# Patient Record
Sex: FEMALE | Race: WHITE | NOT HISPANIC OR LATINO | URBAN - METROPOLITAN AREA
[De-identification: names, ages, dates, MRNs, and addresses within clinical notes are randomized per-mention and may not be internally consistent; named-entity substitution may affect disease eponyms.]

---

## 2020-07-14 ENCOUNTER — TELEPHONE ENCOUNTER (OUTPATIENT)
Dept: URBAN - METROPOLITAN AREA CLINIC 92 | Facility: CLINIC | Age: 45
End: 2020-07-14

## 2020-07-14 NOTE — HPI-TODAY'S VISIT:
Spoke with pt and drinking and some social alcohol and she was having a bottle 750 ml q other night. She stopped that end of may. The labs June 18 up and says newest labs coming down to 106.  She says amlodipine was changed to a diuretic. Spironolactone. Meds: Toprol. Spironolactone. Dulera. Mirtazepine. Buspar, omeprazole.  Mirtazepine is more cholestatic.  Need to get a sooner appt and we need to see her sooner. She is getting therapy. Dr Issa

## 2020-07-17 ENCOUNTER — LAB OUTSIDE AN ENCOUNTER (OUTPATIENT)
Dept: URBAN - METROPOLITAN AREA TELEHEALTH 2 | Facility: TELEHEALTH | Age: 45
End: 2020-07-17

## 2020-07-17 ENCOUNTER — OFFICE VISIT (OUTPATIENT)
Dept: URBAN - METROPOLITAN AREA TELEHEALTH 2 | Facility: TELEHEALTH | Age: 45
End: 2020-07-17
Payer: COMMERCIAL

## 2020-07-17 DIAGNOSIS — F10.11 ALCOHOL USE DISORDER, MILD, IN EARLY REMISSION, ABUSE: ICD-10-CM

## 2020-07-17 DIAGNOSIS — K75.4 AUTOIMMUNE HEPATITIS: ICD-10-CM

## 2020-07-17 DIAGNOSIS — Z79.899 HIGH RISK MEDICATION USE: ICD-10-CM

## 2020-07-17 DIAGNOSIS — K71.9 DRUG-INDUCED LIVER DISEASE: ICD-10-CM

## 2020-07-17 PROCEDURE — G9908 NO PT TBCO CESS INTERV RNG: HCPCS

## 2020-07-17 PROCEDURE — G9906 PT RECV TBCO CESS INTERV: HCPCS

## 2020-07-17 PROCEDURE — G8427 DOCREV CUR MEDS BY ELIG CLIN: HCPCS

## 2020-07-17 PROCEDURE — 99203 OFFICE O/P NEW LOW 30 MIN: CPT

## 2020-07-17 PROCEDURE — 4004F PT TOBACCO SCREEN RCVD TLK: CPT

## 2020-07-17 PROCEDURE — G8417 CALC BMI ABV UP PARAM F/U: HCPCS

## 2020-07-17 PROCEDURE — G9902 PT SCRN TBCO AND ID AS USER: HCPCS

## 2020-07-17 RX ORDER — TIOTROPIUM BROMIDE 18 UG/1
CAPSULE ORAL; RESPIRATORY (INHALATION)
Qty: 0 | Refills: 0 | Status: DISCONTINUED | COMMUNITY
Start: 1900-01-01

## 2020-07-17 RX ORDER — SPIRONOLACTONE 25 MG/1
1 TABLET TABLET, FILM COATED ORAL
Status: ACTIVE | COMMUNITY

## 2020-07-17 RX ORDER — BUPROPION HYDROCHLORIDE 150 MG/1
1 TABLET IN THE MORNING TABLET, EXTENDED RELEASE ORAL ONCE A DAY
Status: ACTIVE | COMMUNITY

## 2020-07-17 RX ORDER — ALPRAZOLAM 0.5 MG
TAKE 1 TABLET BY ORAL ROUTE DAILY AS NEEDED TABLET ORAL 1
Qty: 0 | Refills: 0 | Status: DISCONTINUED | COMMUNITY
Start: 1900-01-01

## 2020-07-17 RX ORDER — ALBUTEROL SULFATE 90 UG/1
AEROSOL, METERED RESPIRATORY (INHALATION)
Qty: 0 | Refills: 0 | Status: DISCONTINUED | COMMUNITY
Start: 1900-01-01

## 2020-07-17 RX ORDER — METOPROLOL SUCCINATE 50 MG
1 TABLET TABLET, EXTENDED RELEASE 24 HR ORAL ONCE A DAY
Status: ACTIVE | COMMUNITY

## 2020-07-17 RX ORDER — MIRTAZAPINE 15 MG/1
1 TABLET AT BEDTIME TABLET, FILM COATED ORAL ONCE A DAY
Status: ACTIVE | COMMUNITY

## 2020-07-17 RX ORDER — MOMETASONE FUROATE AND FORMOTEROL FUMARATE DIHYDRATE 100; 5 UG/1; UG/1
2 PUFFS AEROSOL RESPIRATORY (INHALATION) TWICE A DAY
Status: ACTIVE | COMMUNITY

## 2020-07-17 RX ORDER — OMEPRAZOLE 40 MG/1
1 CAPSULE 30 MINUTES BEFORE MORNING MEAL CAPSULE, DELAYED RELEASE ORAL ONCE A DAY
Status: ACTIVE | COMMUNITY

## 2020-07-17 RX ORDER — OLANZAPINE 5 MG/1
TAKE 1 TABLET (5 MG) BY ORAL ROUTE ONCE DAILY TABLET ORAL 1
Qty: 0 | Refills: 0 | Status: DISCONTINUED | COMMUNITY
Start: 1900-01-01

## 2020-07-17 RX ORDER — TEMAZEPAM 30 MG/1
TAKE 1 CAPSULE (30 MG) BY ORAL ROUTE ONCE DAILY AT BEDTIME AS NEEDED CAPSULE ORAL 1
Qty: 0 | Refills: 0 | Status: DISCONTINUED | COMMUNITY
Start: 1900-01-01

## 2020-07-17 RX ORDER — VENLAFAXINE HCL 37.5 MG
TAKE 2 CAPSULES (75 MG) BY ORAL ROUTE ONCE DAILY CAPSULE, EXT RELEASE 24 HR ORAL 1
Qty: 0 | Refills: 0 | Status: DISCONTINUED | COMMUNITY
Start: 1900-01-01

## 2020-07-17 NOTE — HPI-TODAY'S VISIT:
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   The patient relates no significant family or personal history of liver disease.   She states no history of new medications or alcohol use. The patient reports a personal history of no other habits that could cause liver damage.   Interval testing:   primary 2020 glu 92 and bun 12 and cr 0.80,  na 139 and k 4.3 and cl 105 and co2 20 and ca 9.7 and tp 7.0 and alb 4.2 and tb 0.4 and alk 125 and ast 196 and alt 178. tsh 1.170.  2020 bun 15 and cr 0.75 and na 137 and k 4.9 and cl 103 and cpo2 22 and alb 4.2 and tb 0.2 and ast 28 and alt 106.  tb 0.2.  She stopped the alcohol and cocaine  or 2nd week and alchohol.  She had  for 5 yr s divorce ongoing.  In Oct 2019 and she started the cocaine [place] more regularly.  2015: wbc 8, hg 11.9, plat 444. glu 90, cr 0.66, na 137 k 4, ast 18 and alt 19, alk 91. \  May 2015: glu 130, cr 0.72, na 140, k 4.3, alb 4.0, tb 0.4, alk 79, ast 50 and alt 54. She had started a new medicine Invega and off that and on Xyprexa and still on effexor and back on the ursodiol and having cramps but not taking that with food so we will do so.  For her bipolar not on meds and psych told all current is depression and anxiety and for this mirtazepine 15mg po qd and wellbutrin 150mg am. Other anxiety med is buspar 5mg po bid.  Nov 10 2014 labs ast 15 and alt 16 alk 82, alb 3.8, bili 0.4.cr 0.83, na 138 and k 3.8, wbc 9.9, hg 13.1, plat 429 but then down to 364 on redo. Inr 0.99.   2014 Bx nonspecific hepatitis and subscapsular biopsy so hard to see fibrosis focal portal fibrosis. They suspected could be drug but not clearly.  Occ she has had alcohol issues and been off and she had a lot of issues.  Marijuana also being used and cautioned inc lfts in some and also suspected to cause fibrosis. In hcv pts who used it would be noted to have worse fibrosis progression melodie.   She says on toprol and  Spironolactone.  On some omeprazole and dulera.  Stressed to pt the need for social distancing and strict handwashing and wearing a mask and to follow any other new or added CDC recommendations as this is an evolving target.  Duration of visit was 32 minutes (portion on the video and portion by phone) with greater than 50% of time spent reviewing chart and events with the patient.   Attempts were made to use real-time two-way video technology for today's encounter. Due to a technological failure or access issues, telephone technology was used in lieu of an office visit due to the current COVID-19 pandemic crisis and need for social isolation.  Patient seen today via telehealth by agreement and consent of patient in light of current COVID-19 pandemic. I used video conferencing during the visit. The patient encounter is appropriate and reasonable under the circumstances given the patient's particular presentation at this time. The patient has been advised of the followin) the potential risks and limitations of this mode of treatment (including but not limited to the absence of in-person examination); 2) the right to refuse telehealth services at any point without affecting the right to future care; 3) the right to receive in-person services, included immediately after this consultation if an urgent need arises; 4) information, including identifiable images or information from this telehealth consult, will only be shared in accordance with HIPAA regulations. Any and all of the patient's and/or patient's family member's questions on this issue have been answered. The patient has verbally consented to be treated via telehealth services. The patient has also been advised to contact this office for worsening conditions or problems, and seek emergency medical treatment and/or call 911 if the patient deems either necessary.

## 2020-07-17 NOTE — EXAM-PHYSICAL EXAM
Telemed:  Gen: no distress. Eyes: no jaundice. Mouth: no thrush. CV: no chest pain. Resp: no wheezes. Abd: no pain but some bloating. Ext: no edema. Neuro: no weakness.

## 2020-08-03 ENCOUNTER — LAB OUTSIDE AN ENCOUNTER (OUTPATIENT)
Dept: URBAN - METROPOLITAN AREA TELEHEALTH 2 | Facility: TELEHEALTH | Age: 45
End: 2020-08-03

## 2020-08-07 ENCOUNTER — TELEPHONE ENCOUNTER (OUTPATIENT)
Dept: URBAN - METROPOLITAN AREA CLINIC 92 | Facility: CLINIC | Age: 45
End: 2020-08-07

## 2020-08-07 NOTE — HPI-TODAY'S VISIT:
Dear Chanda,  [facility].s [facility]: they see mild fatty infiltration of the liver. Prior gb surgery changes seen. Visualized pancreas portions normal. No liver masses. Common duct 4mm. Portal vein patent. Kidneys unremarkable. Spleen 7.8cm unremarkable.  Should get better as time passes from the issues we discussed.  Dr Issa

## 2020-08-10 ENCOUNTER — TELEPHONE ENCOUNTER (OUTPATIENT)
Dept: URBAN - METROPOLITAN AREA CLINIC 92 | Facility: CLINIC | Age: 45
End: 2020-08-10

## 2020-08-10 NOTE — HPI-OTHER HISTORIES
Dear Chanda,  Aug 4 2020 labs: glu 61 and little lowm bun 17 and cr 0.91 and na 135 and k 5.2 and cl 96 and co2 20 and ca 10.9 elevated and show local md. alb 4.7 and tb 0.6 and alk 104 and ast 24 and alt 28. ideal alt <25. so Labs much better.  You need to look at calcium issue with local md.  Dr Issa

## 2020-08-31 ENCOUNTER — LAB OUTSIDE AN ENCOUNTER (OUTPATIENT)
Dept: URBAN - METROPOLITAN AREA TELEHEALTH 2 | Facility: TELEHEALTH | Age: 45
End: 2020-08-31

## 2020-09-09 ENCOUNTER — OFFICE VISIT (OUTPATIENT)
Dept: URBAN - METROPOLITAN AREA TELEHEALTH 2 | Facility: TELEHEALTH | Age: 45
End: 2020-09-09
Payer: COMMERCIAL

## 2020-09-09 DIAGNOSIS — D80.2 IGA DEFICIENCY: ICD-10-CM

## 2020-09-09 DIAGNOSIS — Z71.89 VACCINE COUNSELING: ICD-10-CM

## 2020-09-09 DIAGNOSIS — Z98.890 HISTORY OF FUNDOPLICATION: ICD-10-CM

## 2020-09-09 DIAGNOSIS — G44.009: ICD-10-CM

## 2020-09-09 DIAGNOSIS — Z71.6 TOBACCO ABUSE COUNSELING: ICD-10-CM

## 2020-09-09 DIAGNOSIS — F31.30 BIPOLAR AFFECTIVE DISORDER, CURRENT EPISODE DEPRESSED, CURRENT EPISODE SEVERITY UNSPECIFIED: ICD-10-CM

## 2020-09-09 DIAGNOSIS — F10.11 ALCOHOL USE DISORDER, MILD, IN EARLY REMISSION, ABUSE: ICD-10-CM

## 2020-09-09 DIAGNOSIS — R74.8 ABNORMAL LIVER ENZYMES: ICD-10-CM

## 2020-09-09 DIAGNOSIS — K75.4 AUTOIMMUNE HEPATITIS: ICD-10-CM

## 2020-09-09 DIAGNOSIS — K71.9 DRUG-INDUCED LIVER DISEASE: ICD-10-CM

## 2020-09-09 DIAGNOSIS — Z79.899 HIGH RISK MEDICATION USE: ICD-10-CM

## 2020-09-09 DIAGNOSIS — E66.3 OVERWEIGHT: ICD-10-CM

## 2020-09-09 PROCEDURE — 1036F TOBACCO NON-USER: CPT

## 2020-09-09 PROCEDURE — G9903 PT SCRN TBCO ID AS NON USER: HCPCS

## 2020-09-09 PROCEDURE — G8417 CALC BMI ABV UP PARAM F/U: HCPCS

## 2020-09-09 PROCEDURE — G8427 DOCREV CUR MEDS BY ELIG CLIN: HCPCS

## 2020-09-09 PROCEDURE — 99214 OFFICE O/P EST MOD 30 MIN: CPT

## 2020-09-09 RX ORDER — OMEPRAZOLE 40 MG/1
1 CAPSULE 30 MINUTES BEFORE MORNING MEAL CAPSULE, DELAYED RELEASE ORAL ONCE A DAY
Status: ACTIVE | COMMUNITY

## 2020-09-09 RX ORDER — MIRTAZAPINE 15 MG/1
1 TABLET AT BEDTIME TABLET, FILM COATED ORAL ONCE A DAY
Status: ACTIVE | COMMUNITY

## 2020-09-09 RX ORDER — BUPROPION HYDROCHLORIDE 150 MG/1
1 TABLET IN THE MORNING TABLET, EXTENDED RELEASE ORAL ONCE A DAY
Status: DISCONTINUED | COMMUNITY

## 2020-09-09 RX ORDER — METOPROLOL SUCCINATE 50 MG
1 TABLET TABLET, EXTENDED RELEASE 24 HR ORAL ONCE A DAY
Status: ACTIVE | COMMUNITY

## 2020-09-09 RX ORDER — SPIRONOLACTONE 25 MG/1
1 TABLET TABLET, FILM COATED ORAL
Status: ACTIVE | COMMUNITY

## 2020-09-09 RX ORDER — BUSPIRONE HYDROCHLORIDE 10 MG/1
1 TABLET TABLET ORAL
Status: ACTIVE | COMMUNITY

## 2020-09-09 RX ORDER — MOMETASONE FUROATE AND FORMOTEROL FUMARATE DIHYDRATE 100; 5 UG/1; UG/1
2 PUFFS AEROSOL RESPIRATORY (INHALATION) TWICE A DAY
Status: ACTIVE | COMMUNITY

## 2020-09-09 RX ORDER — URSODIOL 300 MG/1
AS DIRECTED CAPSULE ORAL TWICE A DAY
Status: ACTIVE | COMMUNITY

## 2020-09-09 RX ORDER — FUROSEMIDE 40 MG/1
1 TABLET TABLET ORAL ONCE A DAY
Status: ACTIVE | COMMUNITY

## 2020-09-09 RX ORDER — LAMOTRIGINE 25 MG/1
3 TABLET ORAL ONCE A DAY
Status: ACTIVE | COMMUNITY

## 2020-09-09 NOTE — HPI-TODAY'S VISIT:
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   The patient relates no significant family or personal history of liver disease.   In the process of her divorce.  She has noted a lot of issues.  She states no history of new medications or alcohol use and she has stayed off that.  She says has some marijuana a few times a week and trying to cut back on cig.  Off the cocaine also.  She is working for possible 10-4 for [employer] for that.  The patient reports a personal history of no other habits that could cause liver damage.   Interval testing:   [facility].s [facility]: they see mild fatty infiltration of the liver. Prior gb surgery changes seen. Visualized pancreas portions normal. No liver masses. Common duct 4mm. Portal vein patent. Kidneys unremarkable. Spleen 7.8cm unremarkable.  Should get better as time passes from the issues we discussed.  Aug 4 2020 labs: glu 61 and little low bun 17 and cr 0.91 and na 135 and k 5.2 and cl 96 and co2 20 and ca 10.9 elevated and show local md. alb 4.7 and tb 0.6 and alk 104 and ast 24 and alt 28. ideal alt <25. so Labs much better.  her calcium issue noted and was up and should show to local  md.  Aug 28 2020 ca 9.7 and she says that just pre labs she was craving cereal and may have be fortified.  Rare advil or aleve.  2020 bun 15 and cr 0.75 and na 137 and k 4.9 and cl 103 and cpo2 22 and alb 4.2 and tb 0.2 and ast 28 and alt 106.  tb 0.2.   2020 glu 92 and bun 12 and cr 0.80,  na 139 and k 4.3 and cl 105 and co2 20 and ca 9.7 and tp 7.0 and alb 4.2 and tb 0.4 and alk 125 and ast 196 and alt 178. tsh 1.170.  She stopped the alcohol and cocaine  or 2nd week and alcohol.  She had  for 5 yr s divorce ongoing.  In Oct 2019 and she started the cocaine [place] more regularly.  2015: wbc 8, hg 11.9, plat 444. glu 90, cr 0.66, na 137 k 4, ast 18 and alt 19, alk 91.   May 2015: glu 130, cr 0.72, na 140, k 4.3, alb 4.0, tb 0.4, alk 79, ast 50 and alt 54. She had started a new medicine Invega and off that and on Xyprexa and still on effexor and back on the ursodiol and having cramps but not taking that with food so we will do so.  For her bipolar not on meds and psych told all current is depression and anxiety and for this mirtazepine 15mg po qd and wellbutrin 150mg am. Other anxiety med is buspar 5mg po bid.  Nov 10 2014 labs ast 15 and alt 16 alk 82, alb 3.8, bili 0.4.cr 0.83, na 138 and k 3.8, wbc 9.9, hg 13.1, plat 429 but then down to 364 on redo. Inr 0.99.    Bx nonspecific hepatitis and subscapsular biopsy so hard to see fibrosis focal portal fibrosis. They suspected could be drug but not clearly.  Occ she has had alcohol issues and been off and she had a lot of issues.  Marijuana also being used and cautioned inc lfts in some and also suspected to cause fibrosis. In hcv pts who used it would be noted to have worse fibrosis progression cure.  regarding med: on furosemide and still on toprol and Spironolactone. Also on some omeprazole and dulera.  Some pitting in calves and thighs.  Bp this weekend spiked up on . Weight up a few pounds.  Some tiny ankle swelling.  She says less issue.  Mother had mrsa pneumonia and in for a week and she was given some abx.  She does not feel well and eyes puffy.  Trouble sleeping and memory off.   Lamictal also given and she has been on this now for aug 17. Many of the symptoms could be from this also swathi memory issues.  On ursodiol for has been on it for her liver started  and she will stop it and see if labs stay well as having a lot of side effects. Never needed prior and labs so much better.  Stressed to pt the need for social distancing and strict handwashing and wearing a mask and to follow any other new or added CDC recommendations as this is an evolving target.  Duration of visit was 35 minutes via [employer] dev[employer] with greater than 50% of time spent reviewing chart and events with the patient.   More than half of the face-to-face time used for counseling and coordination of care. Patient seen today via telehealth by agreement and consent of patient in light of current COVID-19 pandemic. I used video conferencing during the visit. The patient encounter is appropriate and reasonable under the circumstances given the patient's particular presentation at this time. The patient has been advised of the followin) the potential risks and limitations of this mode of treatment (including but not limited to the absence of in-person examination); 2) the right to refuse telehealth services at any point without affecting the right to future care; 3) the right to receive in-person services, included immediately after this consultation if an urgent need arises; 4) information, including identifiable images or information from this telehealth consult, will only be shared in accordance with HIPAA regulations. Any and all of the patient's and/or patient's family member's questions on this issue have been answered. The patient has verbally consented to be treated via telehealth services. The patient has also been advised to contact this office for worsening conditions or problems, and seek emergency medical treatment and/or call 911 if the patient deems either necessary.

## 2020-09-09 NOTE — EXAM-PHYSICAL EXAM
Telemed:  Gen: no distress but  with new meds and bloating and concentration and some leg swelling. Eyes: no jaundice. Mouth: no thrush. CV: no chest pain. Resp: no wheezes. Abd: no pain but some bloating. Ext: no edema. Neuro: no weakness.

## 2020-09-13 ENCOUNTER — TELEPHONE ENCOUNTER (OUTPATIENT)
Dept: URBAN - METROPOLITAN AREA CLINIC 92 | Facility: CLINIC | Age: 45
End: 2020-09-13

## 2020-09-13 NOTE — HPI-TODAY'S VISIT:
Chanda  Saw some local labs sent in: not sure date: bun 17 and cr 0.92 and na 138 and k 4.1 and cl 101 and co2 25 and ca 9.7 and tb 0.5 and alk 88 and ast 16 and alt 20. vit d low 16.6.   Dr Issa

## 2020-09-16 ENCOUNTER — LAB OUTSIDE AN ENCOUNTER (OUTPATIENT)
Dept: URBAN - METROPOLITAN AREA TELEHEALTH 2 | Facility: TELEHEALTH | Age: 45
End: 2020-09-16

## 2020-09-28 ENCOUNTER — LAB OUTSIDE AN ENCOUNTER (OUTPATIENT)
Dept: URBAN - METROPOLITAN AREA TELEHEALTH 2 | Facility: TELEHEALTH | Age: 45
End: 2020-09-28

## 2020-09-30 ENCOUNTER — LAB OUTSIDE AN ENCOUNTER (OUTPATIENT)
Dept: URBAN - METROPOLITAN AREA TELEHEALTH 2 | Facility: TELEHEALTH | Age: 45
End: 2020-09-30

## 2020-10-08 ENCOUNTER — TELEPHONE ENCOUNTER (OUTPATIENT)
Dept: URBAN - METROPOLITAN AREA CLINIC 92 | Facility: CLINIC | Age: 45
End: 2020-10-08

## 2020-10-08 NOTE — HPI-OTHER HISTORIES
Dear Chanda, Oct 6: wbc 11.2 little up, hg 14.4 and hct 42.2, plat 141. Mcv 90, glu 103 little up and maybe not fasting.  Bun 20 and cr 0.98 and na 135 and k 4.5, cl 99 and co2 22. Alb 4.2 and tb 0.5 and alk 111 and ast 16 and alt 25. Ideal alt <25.  Prior aug ast 24 and alt 28 so better now and alk 104. So labs better.   Dr Issa

## 2020-10-14 ENCOUNTER — LAB OUTSIDE AN ENCOUNTER (OUTPATIENT)
Dept: URBAN - METROPOLITAN AREA TELEHEALTH 2 | Facility: TELEHEALTH | Age: 45
End: 2020-10-14

## 2020-10-21 ENCOUNTER — TELEPHONE ENCOUNTER (OUTPATIENT)
Dept: URBAN - METROPOLITAN AREA CLINIC 92 | Facility: CLINIC | Age: 45
End: 2020-10-21

## 2020-10-21 NOTE — HPI-OTHER HISTORIES
Dear Chanda, Oct 19 labs spiked again: alk 198 and ast 128 and alt 248. Tb 0.5. na 136 and k k 4.5. bun 15 and cr 0.94 and glu 112. Oct 6 ast 16 and alt 25 and alk 111.  Could not reach you tonight: will ask Melanie to call you in am.  Dr Issa

## 2020-10-22 ENCOUNTER — OFFICE VISIT (OUTPATIENT)
Dept: URBAN - METROPOLITAN AREA TELEHEALTH 2 | Facility: TELEHEALTH | Age: 45
End: 2020-10-22
Payer: COMMERCIAL

## 2020-10-22 DIAGNOSIS — K75.4 AUTOIMMUNE HEPATITIS: ICD-10-CM

## 2020-10-22 DIAGNOSIS — K71.8 TOXIC LIVER DISEASE WITH OTHER DISORDERS OF LIVER: ICD-10-CM

## 2020-10-22 DIAGNOSIS — D80.2 IGA DEFICIENCY: ICD-10-CM

## 2020-10-22 DIAGNOSIS — R74.8 ABNORMAL LIVER ENZYMES: ICD-10-CM

## 2020-10-22 PROCEDURE — G8483 FLU IMM NO ADMIN DOC REA: HCPCS

## 2020-10-22 PROCEDURE — G9902 PT SCRN TBCO AND ID AS USER: HCPCS

## 2020-10-22 PROCEDURE — G8427 DOCREV CUR MEDS BY ELIG CLIN: HCPCS

## 2020-10-22 PROCEDURE — G9906 PT RECV TBCO CESS INTERV: HCPCS

## 2020-10-22 PROCEDURE — G8417 CALC BMI ABV UP PARAM F/U: HCPCS

## 2020-10-22 PROCEDURE — 99214 OFFICE O/P EST MOD 30 MIN: CPT

## 2020-10-22 RX ORDER — BUSPIRONE HYDROCHLORIDE 5 MG/1
1 TABLET TABLET ORAL
Status: ACTIVE | COMMUNITY

## 2020-10-22 RX ORDER — GABAPENTIN 100 MG/1
1 CAPSULE AND 3 PM CAPSULE ORAL
Status: ACTIVE | COMMUNITY

## 2020-10-22 RX ORDER — METOPROLOL SUCCINATE 50 MG
1 TABLET TABLET, EXTENDED RELEASE 24 HR ORAL ONCE A DAY
Status: ACTIVE | COMMUNITY

## 2020-10-22 RX ORDER — SPIRONOLACTONE 25 MG/1
1 TABLET TABLET, FILM COATED ORAL
Status: ACTIVE | COMMUNITY

## 2020-10-22 RX ORDER — MIRTAZAPINE 7.5 MG/1
0.5 TABLET AT BEDTIME TABLET, FILM COATED ORAL ONCE A DAY
Status: ACTIVE | COMMUNITY

## 2020-10-22 RX ORDER — LAMOTRIGINE 25 MG/1
2 IN AM AND 1 IN NOON TABLET ORAL
Status: ACTIVE | COMMUNITY

## 2020-10-22 RX ORDER — FUROSEMIDE 40 MG/1
1 TABLET TABLET ORAL ONCE A DAY
Status: ACTIVE | COMMUNITY

## 2020-10-22 RX ORDER — OMEPRAZOLE 40 MG/1
1 CAPSULE 30 MINUTES BEFORE MORNING MEAL CAPSULE, DELAYED RELEASE ORAL ONCE A DAY
Status: ACTIVE | COMMUNITY

## 2020-10-22 RX ORDER — MOMETASONE FUROATE AND FORMOTEROL FUMARATE DIHYDRATE 100; 5 UG/1; UG/1
2 PUFFS AEROSOL RESPIRATORY (INHALATION) TWICE A DAY
Status: ACTIVE | COMMUNITY

## 2020-10-22 RX ORDER — URSODIOL 300 MG/1
AS DIRECTED CAPSULE ORAL TWICE A DAY
Status: DISCONTINUED | COMMUNITY

## 2020-10-22 NOTE — EXAM-PHYSICAL EXAM
Telemed:  Gen: no distress but  with new meds and bloating issues. Eyes: no jaundice. Mouth: no thrush. CV: no chest pain. Resp: no wheezes. Abd: no pain but some bloating. Ext: no edema. Neuro: no weakness.

## 2020-10-26 ENCOUNTER — OFFICE VISIT (OUTPATIENT)
Dept: URBAN - METROPOLITAN AREA TELEHEALTH 2 | Facility: TELEHEALTH | Age: 45
End: 2020-10-26
Payer: COMMERCIAL

## 2020-10-26 ENCOUNTER — LAB OUTSIDE AN ENCOUNTER (OUTPATIENT)
Dept: URBAN - METROPOLITAN AREA TELEHEALTH 2 | Facility: TELEHEALTH | Age: 45
End: 2020-10-26

## 2020-10-26 DIAGNOSIS — R11.2 NAUSEA AND VOMITING: ICD-10-CM

## 2020-10-26 DIAGNOSIS — K75.4 AUTOIMMUNE HEPATITIS: ICD-10-CM

## 2020-10-26 DIAGNOSIS — R13.10 DYSPHAGIA: ICD-10-CM

## 2020-10-26 DIAGNOSIS — R19.4 CHANGE IN BOWEL HABITS: ICD-10-CM

## 2020-10-26 PROBLEM — 266433003 GASTROESOPHAGEAL REFLUX DISEASE WITH ESOPHAGITIS: Status: ACTIVE | Noted: 2020-10-26

## 2020-10-26 PROCEDURE — 4004F PT TOBACCO SCREEN RCVD TLK: CPT | Performed by: INTERNAL MEDICINE

## 2020-10-26 PROCEDURE — G9902 PT SCRN TBCO AND ID AS USER: HCPCS | Performed by: INTERNAL MEDICINE

## 2020-10-26 PROCEDURE — G8417 CALC BMI ABV UP PARAM F/U: HCPCS | Performed by: INTERNAL MEDICINE

## 2020-10-26 PROCEDURE — G9906 PT RECV TBCO CESS INTERV: HCPCS | Performed by: INTERNAL MEDICINE

## 2020-10-26 PROCEDURE — G8482 FLU IMMUNIZE ORDER/ADMIN: HCPCS | Performed by: INTERNAL MEDICINE

## 2020-10-26 PROCEDURE — G8427 DOCREV CUR MEDS BY ELIG CLIN: HCPCS | Performed by: INTERNAL MEDICINE

## 2020-10-26 PROCEDURE — 99215 OFFICE O/P EST HI 40 MIN: CPT | Performed by: INTERNAL MEDICINE

## 2020-10-26 RX ORDER — SPIRONOLACTONE 25 MG/1
1 TABLET TABLET, FILM COATED ORAL
Status: ACTIVE | COMMUNITY

## 2020-10-26 RX ORDER — GABAPENTIN 100 MG/1
1 CAPSULE AND 3 PM CAPSULE ORAL
Status: ACTIVE | COMMUNITY

## 2020-10-26 RX ORDER — METOPROLOL SUCCINATE 50 MG
1 TABLET TABLET, EXTENDED RELEASE 24 HR ORAL ONCE A DAY
Status: ACTIVE | COMMUNITY

## 2020-10-26 RX ORDER — BUSPIRONE HYDROCHLORIDE 5 MG/1
1 TABLET TABLET ORAL
Status: ACTIVE | COMMUNITY

## 2020-10-26 RX ORDER — LAMOTRIGINE 25 MG/1
2 IN AM AND 1 IN NOON TABLET ORAL
Status: ACTIVE | COMMUNITY

## 2020-10-26 RX ORDER — FUROSEMIDE 40 MG/1
1 TABLET TABLET ORAL ONCE A DAY
Status: ACTIVE | COMMUNITY

## 2020-10-26 RX ORDER — OMEPRAZOLE 40 MG/1
1 CAPSULE 30 MINUTES BEFORE MORNING MEAL CAPSULE, DELAYED RELEASE ORAL ONCE A DAY
Status: ACTIVE | COMMUNITY

## 2020-10-26 RX ORDER — MOMETASONE FUROATE AND FORMOTEROL FUMARATE DIHYDRATE 100; 5 UG/1; UG/1
2 PUFFS AEROSOL RESPIRATORY (INHALATION) TWICE A DAY
Status: ACTIVE | COMMUNITY

## 2020-10-26 RX ORDER — MIRTAZAPINE 7.5 MG/1
0.5 TABLET AT BEDTIME TABLET, FILM COATED ORAL ONCE A DAY
Status: ACTIVE | COMMUNITY

## 2020-10-26 NOTE — HPI-TODAY'S VISIT:
- 46 yo  female who I am seeing today in a Telehealth video visit for follow-up - Has a h/o functional GI disorders, including IBS and gastroparesis, for which she was previously following with Dr. Susy Manrique, whom she last saw in 10/2014 - In 11/2014 she had hiatal hernia repair, fundoplication, and pyloroplasty performed by Dr. Yvon Dela at [facility] (she appears as Chanda Sarmiento in the [facility] records).  She did well after that surgery.  However, today the patient complains of worsening GI symptoms for the past 3 months.  These include frequent nausea, vomiting, dysphagia, abdominal bloating, and early satiety.  Gets intermittent abdominal cramping.  Denies h/o diabetes.  Denies weight loss; states instead that she has been gaining weight. - Has been taking omeprazole 40 mg daily for the past 3 months, with only partial relief of symptoms - She also notes a change in bowel habits: diarrhea for the past 2 months, whereas previously she was constipated.  She is currently having 2-4 stools per day, ranging between loose and soft.  States she completed a course of antibiotics 2 weeks ago by her PCP.  She forgot what her PCP was treating. - Has autoimmune hepatitis for which she sees Dr. Jonas Issa; she last saw him 4 days ago and I have reviewed his office note.  Dr. Issa ordered bloodwork which is still pending.

## 2020-10-29 ENCOUNTER — TELEPHONE ENCOUNTER (OUTPATIENT)
Dept: URBAN - METROPOLITAN AREA CLINIC 92 | Facility: CLINIC | Age: 45
End: 2020-10-29

## 2020-10-29 LAB
ALBUMIN: 4.3
ALKALINE PHOSPHATASE: 210
ALT (SGPT): 222
AST (SGOT): 121
BILIRUBIN, DIRECT: 0.19
BILIRUBIN, TOTAL: 0.5
PROTEIN, TOTAL: 7.4

## 2020-10-29 NOTE — HPI-OTHER HISTORIES
Dear Chanda Segura The results of your recent tests are explained below: oct 26 labs tb 0.5 and alk 210 and ast 121 and alt 222. alk 248 and so now 210 so that is better, and ast 128 and that is now 121 and alt was 248 and now 222 so better and we need to follow.  Would redo in 1 week or so as still up but can slow once turns more. Left vmail.

## 2020-11-02 ENCOUNTER — LAB OUTSIDE AN ENCOUNTER (OUTPATIENT)
Dept: URBAN - METROPOLITAN AREA TELEHEALTH 2 | Facility: TELEHEALTH | Age: 45
End: 2020-11-02

## 2020-11-03 ENCOUNTER — TELEPHONE ENCOUNTER (OUTPATIENT)
Dept: URBAN - METROPOLITAN AREA CLINIC 92 | Facility: CLINIC | Age: 45
End: 2020-11-03

## 2020-11-03 LAB
ALBUMIN: 4.7
ALKALINE PHOSPHATASE: 213
ALT (SGPT): 118
AST (SGOT): 50
BILIRUBIN, DIRECT: 0.15
BILIRUBIN, TOTAL: 0.4
PROTEIN, TOTAL: 7.9

## 2020-11-03 NOTE — HPI-OTHER HISTORIES
Dear Chanda Segura The results of your recent tests are explained below: 11-2: ast down to 50 and alt 118 and prior 121 and alt 222 . alk 213 still up from 210 and tb down to 0.4 from 0.5.  Called pt first came off lamictal 4-5 days ago. Prior the green tea use also. That may have revved up her system and still off the ursodiol. Saw primary md and do next week and  we can look at that again and in 2 weeks.

## 2020-11-04 LAB
GASTROINTESTINAL PATHOGEN: (no result)
PANCREATICELASTASE ELISA, STOOL: (no result)

## 2020-11-13 ENCOUNTER — TELEPHONE ENCOUNTER (OUTPATIENT)
Dept: URBAN - METROPOLITAN AREA CLINIC 23 | Facility: CLINIC | Age: 45
End: 2020-11-13

## 2020-11-13 RX ORDER — OMEPRAZOLE 40 MG/1
1 CAPSULE 30 MINUTES BEFORE MORNING MEAL CAPSULE, DELAYED RELEASE ORAL ONCE A DAY
Status: ACTIVE | COMMUNITY

## 2020-11-13 RX ORDER — FUROSEMIDE 40 MG/1
1 TABLET TABLET ORAL ONCE A DAY
Status: ACTIVE | COMMUNITY

## 2020-11-13 RX ORDER — SPIRONOLACTONE 25 MG/1
1 TABLET TABLET, FILM COATED ORAL
Status: ACTIVE | COMMUNITY

## 2020-11-13 RX ORDER — GABAPENTIN 100 MG/1
1 CAPSULE AND 3 PM CAPSULE ORAL
Status: ACTIVE | COMMUNITY

## 2020-11-13 RX ORDER — BUSPIRONE HYDROCHLORIDE 5 MG/1
1 TABLET TABLET ORAL
Status: ACTIVE | COMMUNITY

## 2020-11-13 RX ORDER — MIRTAZAPINE 7.5 MG/1
0.5 TABLET AT BEDTIME TABLET, FILM COATED ORAL ONCE A DAY
Status: ACTIVE | COMMUNITY

## 2020-11-13 RX ORDER — METOPROLOL SUCCINATE 50 MG
1 TABLET TABLET, EXTENDED RELEASE 24 HR ORAL ONCE A DAY
Status: ACTIVE | COMMUNITY

## 2020-11-13 RX ORDER — LAMOTRIGINE 25 MG/1
2 IN AM AND 1 IN NOON TABLET ORAL
Status: ACTIVE | COMMUNITY

## 2020-11-13 RX ORDER — POLYETHYLENE GLYOCOL 3350, SODIUM CHLORIDE, SODIUM BICARBONATE AND POTASSIUM CHLORIDE 420; 11.2; 5.72; 1.48 G/4L; G/4L; G/4L; G/4L
TAKE AS DIRECTED IN SPLIT DOSE FASHION.  MUST COMPLETE BOWEL PREP AT LEAST 4 HOURS PRIOR TO YOUR SCHEDULED COLONOSCOPY POWDER, FOR SOLUTION NASOGASTRIC; ORAL ONCE
Qty: 1 | Refills: 0 | OUTPATIENT
Start: 2020-11-16 | End: 2020-11-17

## 2020-11-13 RX ORDER — MOMETASONE FUROATE AND FORMOTEROL FUMARATE DIHYDRATE 100; 5 UG/1; UG/1
2 PUFFS AEROSOL RESPIRATORY (INHALATION) TWICE A DAY
Status: ACTIVE | COMMUNITY

## 2020-11-16 ENCOUNTER — TELEPHONE ENCOUNTER (OUTPATIENT)
Dept: URBAN - METROPOLITAN AREA CLINIC 23 | Facility: CLINIC | Age: 45
End: 2020-11-16

## 2020-11-17 ENCOUNTER — WEB ENCOUNTER (OUTPATIENT)
Dept: URBAN - METROPOLITAN AREA CLINIC 23 | Facility: CLINIC | Age: 45
End: 2020-11-17

## 2020-11-17 ENCOUNTER — TELEPHONE ENCOUNTER (OUTPATIENT)
Dept: URBAN - METROPOLITAN AREA CLINIC 23 | Facility: CLINIC | Age: 45
End: 2020-11-17

## 2020-11-17 ENCOUNTER — TELEPHONE ENCOUNTER (OUTPATIENT)
Dept: URBAN - METROPOLITAN AREA CLINIC 92 | Facility: CLINIC | Age: 45
End: 2020-11-17

## 2020-11-17 NOTE — HPI-OTHER HISTORIES
Sandipcornelius,  Nov 9 labs:  wbc 13. 4hg 14 and plat 453 elevated slightly (150-450 normal). na 135 k 4.6 and cl 97 and co2 26 and calcium elevated 11.0. Show to local md: why is calcium up in you?alb 4.5 and tv 0.6 and alk 165 elevated and ast 22 and alt 36 so back down. chol 336 elevated and trg 307 and ldl 219. Lipids off and not sure if due to recent liver flare or other and may want to redo that as known if liver flared that this can be off. Is this usual for you to have numbers like that ?vit d low 28.8. a1c 5.9%.  Nov 3: ast 50 and alt 121 (both down then from 121 and 222). So this is now off green tea and the lamictal also off prior. Suspect gree tea was immune stimulant revving up your immune system.) I would redo lfts in in 2 more weeks and suspect should be better and then can slow down. Caution with use of immune stimulants like green tea or tumeric or elderberry or zinc as that can and will rev up the lfts in pts with immune issues like we have always suspected you to have.  Dr Issa

## 2020-11-19 ENCOUNTER — TELEPHONE ENCOUNTER (OUTPATIENT)
Dept: URBAN - METROPOLITAN AREA CLINIC 23 | Facility: CLINIC | Age: 45
End: 2020-11-19

## 2020-11-23 ENCOUNTER — LAB OUTSIDE AN ENCOUNTER (OUTPATIENT)
Dept: URBAN - METROPOLITAN AREA TELEHEALTH 2 | Facility: TELEHEALTH | Age: 45
End: 2020-11-23

## 2020-11-26 ENCOUNTER — TELEPHONE ENCOUNTER (OUTPATIENT)
Dept: URBAN - METROPOLITAN AREA CLINIC 92 | Facility: CLINIC | Age: 45
End: 2020-11-26

## 2020-11-26 LAB
A/G RATIO: 1.3
ALBUMIN: 4.7
ALKALINE PHOSPHATASE: 130
ALT (SGPT): 18
AST (SGOT): 15
BASO (ABSOLUTE): 0.1
BASOS: 1
BILIRUBIN, TOTAL: 0.4
BUN/CREATININE RATIO: 13
BUN: 14
CALCIUM: 10
CARBON DIOXIDE, TOTAL: 24
CHLORIDE: 99
CREATININE: 1.08
EGFR IF AFRICN AM: 71
EGFR IF NONAFRICN AM: 62
EOS (ABSOLUTE): 0.3
EOS: 3
GLOBULIN, TOTAL: 3.5
GLUCOSE: 144
HEMATOCRIT: 41.7
HEMATOLOGY COMMENTS:: (no result)
HEMOGLOBIN: 14.1
IMMATURE CELLS: (no result)
IMMATURE GRANS (ABS): 0
IMMATURE GRANULOCYTES: 0
LYMPHS (ABSOLUTE): 2.4
LYMPHS: 21
MCH: 30.1
MCHC: 33.8
MCV: 89
MONOCYTES(ABSOLUTE): 0.7
MONOCYTES: 7
NEUTROPHILS (ABSOLUTE): 7.7
NEUTROPHILS: 68
NRBC: (no result)
PLATELETS: 482
POTASSIUM: 4.3
PROTEIN, TOTAL: 8.2
RBC: 4.68
RDW: 12.9
SODIUM: 139
WBC: 11.3

## 2020-11-26 NOTE — HPI-TODAY'S VISIT:
Dear Chanda Segura The results of your recent tests are explained below: nov 25 wbc 11.3 hg 14.1 plat 482 little up and follow. mcv 89. na 139 and k 4.3 and cl 99 and co2 24 and bun 14 and cr 1.08 and glu 144 elevated. ast 50 and alt 118 and alk 213 prior and so much better.  May need to see someone re why platelets staying up: may be due to that reaction or other med reaction?

## 2020-12-07 ENCOUNTER — OFFICE VISIT (OUTPATIENT)
Dept: URBAN - METROPOLITAN AREA TELEHEALTH 2 | Facility: TELEHEALTH | Age: 45
End: 2020-12-07
Payer: COMMERCIAL

## 2020-12-07 ENCOUNTER — OFFICE VISIT (OUTPATIENT)
Dept: URBAN - METROPOLITAN AREA LAB 3 | Facility: LAB | Age: 45
End: 2020-12-07
Payer: COMMERCIAL

## 2020-12-07 DIAGNOSIS — R74.8 ABNORMAL LIVER ENZYMES: ICD-10-CM

## 2020-12-07 DIAGNOSIS — R19.7 ACUTE DIARRHEA: ICD-10-CM

## 2020-12-07 DIAGNOSIS — K62.1 DYSPLASTIC POLYP OF RECTUM: ICD-10-CM

## 2020-12-07 DIAGNOSIS — K21.00 ALKALINE REFLUX ESOPHAGITIS: ICD-10-CM

## 2020-12-07 DIAGNOSIS — K29.60 ADENOPAPILLOMATOSIS GASTRICA: ICD-10-CM

## 2020-12-07 DIAGNOSIS — K63.5 BENIGN COLON POLYP: ICD-10-CM

## 2020-12-07 DIAGNOSIS — K75.4 AUTOIMMUNE HEPATITIS: ICD-10-CM

## 2020-12-07 DIAGNOSIS — R13.19 CERVICAL DYSPHAGIA: ICD-10-CM

## 2020-12-07 DIAGNOSIS — K71.9 DRUG-INDUCED LIVER DISEASE: ICD-10-CM

## 2020-12-07 DIAGNOSIS — Z79.899 HIGH RISK MEDICATION USE: ICD-10-CM

## 2020-12-07 PROCEDURE — G8483 FLU IMM NO ADMIN DOC REA: HCPCS

## 2020-12-07 PROCEDURE — G8417 CALC BMI ABV UP PARAM F/U: HCPCS

## 2020-12-07 PROCEDURE — 45380 COLONOSCOPY AND BIOPSY: CPT | Performed by: INTERNAL MEDICINE

## 2020-12-07 PROCEDURE — 43248 EGD GUIDE WIRE INSERTION: CPT | Performed by: INTERNAL MEDICINE

## 2020-12-07 PROCEDURE — G9902 PT SCRN TBCO AND ID AS USER: HCPCS

## 2020-12-07 PROCEDURE — 4004F PT TOBACCO SCREEN RCVD TLK: CPT

## 2020-12-07 PROCEDURE — G9937 DIG OR SURV COLSCO: HCPCS | Performed by: INTERNAL MEDICINE

## 2020-12-07 PROCEDURE — G8427 DOCREV CUR MEDS BY ELIG CLIN: HCPCS

## 2020-12-07 PROCEDURE — 43239 EGD BIOPSY SINGLE/MULTIPLE: CPT | Performed by: INTERNAL MEDICINE

## 2020-12-07 PROCEDURE — 99214 OFFICE O/P EST MOD 30 MIN: CPT

## 2020-12-07 PROCEDURE — 45385 COLONOSCOPY W/LESION REMOVAL: CPT | Performed by: INTERNAL MEDICINE

## 2020-12-07 PROCEDURE — G9906 PT RECV TBCO CESS INTERV: HCPCS

## 2020-12-07 RX ORDER — MIRTAZAPINE 7.5 MG/1
0.5 TABLET AT BEDTIME TABLET, FILM COATED ORAL ONCE A DAY
Status: ACTIVE | COMMUNITY

## 2020-12-07 RX ORDER — OMEPRAZOLE 40 MG/1
1 CAPSULE 30 MINUTES BEFORE MORNING MEAL CAPSULE, DELAYED RELEASE ORAL ONCE A DAY
Status: ACTIVE | COMMUNITY

## 2020-12-07 RX ORDER — SPIRONOLACTONE 25 MG/1
1 TABLET TABLET, FILM COATED ORAL
Status: ACTIVE | COMMUNITY

## 2020-12-07 RX ORDER — METOPROLOL SUCCINATE 50 MG
1 TABLET TABLET, EXTENDED RELEASE 24 HR ORAL ONCE A DAY
Status: ACTIVE | COMMUNITY

## 2020-12-07 RX ORDER — MOMETASONE FUROATE AND FORMOTEROL FUMARATE DIHYDRATE 100; 5 UG/1; UG/1
2 PUFFS AEROSOL RESPIRATORY (INHALATION) TWICE A DAY
Status: ACTIVE | COMMUNITY

## 2020-12-07 RX ORDER — LAMOTRIGINE 25 MG/1
2 IN AM AND 1 IN NOON TABLET ORAL
Status: ACTIVE | COMMUNITY

## 2020-12-07 RX ORDER — BUSPIRONE HYDROCHLORIDE 5 MG/1
1 TABLET TABLET ORAL
Status: ACTIVE | COMMUNITY

## 2020-12-07 RX ORDER — ATORVASTATIN CALCIUM 20 MG/1
1 TABLET TABLET, FILM COATED ORAL ONCE A DAY
Status: ACTIVE | COMMUNITY

## 2020-12-07 RX ORDER — MIRTAZAPINE 7.5 MG/1
0.5 TABLET AT BEDTIME TABLET, FILM COATED ORAL ONCE A DAY
Status: DISCONTINUED | COMMUNITY

## 2020-12-07 RX ORDER — FUROSEMIDE 40 MG/1
1 TABLET TABLET ORAL ONCE A DAY
Status: ACTIVE | COMMUNITY

## 2020-12-07 RX ORDER — GABAPENTIN 100 MG/1
1 CAPSULE AND 3 PM CAPSULE ORAL
Status: ACTIVE | COMMUNITY

## 2020-12-07 RX ORDER — GABAPENTIN 100 MG/1
4 PO QHS CAPSULE ORAL ONCE A DAY
Status: ACTIVE | COMMUNITY

## 2020-12-07 RX ORDER — QUETIAPINE 25 MG/1
1 TABLET AT BEDTIME TABLET, FILM COATED ORAL ONCE A DAY
Status: ACTIVE | COMMUNITY

## 2020-12-07 NOTE — HPI-OTHER HISTORIES
This is a scheduled new visit appointment for this patient, a 45 year old female , after a previous visit on 2015, for an evaluation for abnormal liver enzymes and suspected autoimmune like hepatitis with multiple episodes drug induced hepatitis.  She was drinking green tea for a few weeks. She has an immune issue and so the labs spiked up.  Stopped and labs dropping.  Nov 25 she did labs.  Nov 9 labs:  wbc 13. 4hg 14 and plat 453 elevated slightly (150-450 normal). na 135 k 4.6 and cl 97 and co2 26 and calcium elevated 11.0. Show to local md: why is calcium up in you?alb 4.5 and tb 0.6 and alk 165 elevated and ast 22 and alt 36 so back down. chol 336 elevated and trg 307 and ldl 219. Lipids off and not sure if due to recent liver flare or other and may want to redo that as known if liver flared that this can be off.  Vit d low 28.8. a1c 5.9%.  She says has had low vit d and she was to start this and she was started on 5000 a day for 4 m.  Nov 3: ast 50 and alt 121 (both down then from 121 and 222).   main she is off green tea and the lamictal also off prior.   Suspect gree tea was immune stimulant revving up your immune system. She has a very active immune system.  She did start back on lamictal and the seroquel 25 mg qhs.  11-2: ast down to 50 and alt 118 and prior 121 and alt 222 . alk 213 still up from 210 and tb down to 0.4 from 0.5.    Oct 26 labs tb 0.5 and alk 210 and ast 121 and alt 222. alk 248 and so now 210 so that is better, and ast 128 and that is now 121 and alt was 248 and now 222 so better and we need to follow.    Oct 19 labs spiked again: alk 198 and ast 128 and alt 248. Tb 0.5. na 136 and k k 4.5. bun 15 and cr 0.94 and glu 112.   Oct 6 ast 16 and alt 25 and alk 111.   Prior she was also on abx for 6 weeks and so finished oct 8 or so and so that not the cause.    Oct 6: wbc 11.2 little up, hg 14.4 and hct 42.2, plat 141. Mcv 90, glu 103 little up and maybe not fasting. Bun 20 and cr 0.98 and na 135 and k 4.5, cl 99 and co2 22. Alb 4.2 and tb 0.5 and alk 111 and ast 16 and alt 25. Ideal alt <25.   Prior aug ast 24 and alt 28 so better now and alk 104.  Aug bun 17 and cr 0.92 and na 138 and k 4.1 and cl 101 and co2 25 and ca 9.7 and tb 0.5 and alk 88 and ast 16 and alt 20. vit d low 16.6.   She says she got final decree for divorce and has need for citizenship paper to get this.  She says lamictal came down from 75/50 to 50/25 oct 9 and added gabapentin 400mg and seoquel 25mg po qhs an atorvastatin 40mg po qhs.  Off the cocaine also.   Aunt Ewelina had oltx and she had failed from 2nd tx. Not from covid 19. She has been in touch with family.  Interval testing:         [facility].s ahi: they see mild fatty infiltration of the liver. Prior gb surgery changes seen. Visualized pancreas portions normal. No liver masses. Common duct 4mm. Portal vein patent. Kidneys unremarkable. Spleen 7.8cm unremarkable. Should get better as time passes from the issues we discussed.        Aug 4 2020 labs: glu 61 and little low bun 17 and cr 0.91 and na 135 and k 5.2 and cl 96 and co2 20 and ca 10.9 elevated and show local md. alb 4.7 and tb 0.6 and alk 104 and ast 24 and alt 28. ideal alt <25. so Labs much better.        Aug 28 2020 ca 9.7 and she says that just pre labs she was craving cereal and may have be fortified.        Rare advil or aleve.        July 13 2020 bun 15 and cr 0.75 and na 137 and k 4.9 and cl 103 and cpo2 22 and alb 4.2 and tb 0.2 and ast 28 and alt 106. tb 0.2.         June 18 2020 glu 92 and bun 12 and cr 0.80, na 139 and k 4.3 and cl 105 and co2 20 and ca 9.7 and tp 7.0 and alb 4.2 and tb 0.4 and alk 125 and ast 196 and alt 178. tsh 1.170.        She stopped the alcohol and cocaine June 1st or 2nd week and alcohol.        She had  for 5 yr s divorce nearly done.        In Oct 2019 and she started the cocaine [place] more regularly.        June 2015: wbc 8, hg 11.9, plat 444. glu 90, cr 0.66, na 137 k 4, ast 18 and alt 19, alk 91.         May 2015: glu 130, cr 0.72, na 140, k 4.3, alb 4.0, tb 0.4, alk 79, ast 50 and alt 54. She had started a new medicine Invega and off that and on Xyprexa and still on effexor and back on the ursodiol and having cramps but not taking that with food so we will do so.        For her bipolar not on meds and psych told all current is depression and anxiety and for this gabapentin 300mg qpm and 100mg qam and the mirtazepine 3.75 po qhs and the lamictal 50/25mg and buspar 5mg po bid prn.        Nov 10 2014 labs ast 15 and alt 16 alk 82, alb 3.8, bili 0.4.cr 0.83, na 138 and k 3.8, wbc 9.9, hg 13.1, plat 429 but then down to 364 on redo. Inr 0.99.         2014 Bx nonspecific hepatitis and subscapsular biopsy so hard to see fibrosis focal portal fibrosis. They suspected could be drug but not clearly.        Occ she has had alcohol issues and been off and she had a lot of issues.        Marijuana also being used and cautioned inc lfts in some and also suspected to cause fibrosis. In hcv pts who used it would be noted to have worse fibrosis progression cure.        Regarding med: on furosemide and still on toprol and Spironolactone. Also on some omeprazole and dulera.        Bp on the weekend spiked up on sept 1 and says better now. Weight up a few pounds.        Some tiny ankle swelling at times.        Mother had mrsa pneumonia and in for a week and she was given some abx.        She does not feel well and eyes puffy.        Trouble sleeping and memory off.         She was on ursodiol for has been on it for her liver started july 29 and she will stop it and see if labs stay well as having a lot of side effects. She stopped this.  Nov 25 2020: wbc 11.3 and hg 14.1 plat 484 elevated and neutrophils 7.7 and glu 144 elevated and cr 1.08 little up.na 139 and k 4.3 and cl 99 and co2 24 and ca 10 and alb 4,7 and tb 0.3 and alk 130 and ast 15 and alt 18.  plan: 1. Check again the new labs soon.  2.  Plan for labs  in Jan near end and see in Feb. 3. Ursodiol if  staill very sensisive.         Stressed to pt the need for social distancing and strict handwashing and wearing a mask and to follow any other new or added CDC recommendations as this is an evolving target.        Duration of visit was  31 minutes via [employer] devonte with greater than 50% of time spent reviewing chart and events with the patient.

## 2020-12-28 ENCOUNTER — LAB OUTSIDE AN ENCOUNTER (OUTPATIENT)
Dept: URBAN - METROPOLITAN AREA TELEHEALTH 2 | Facility: TELEHEALTH | Age: 45
End: 2020-12-28

## 2021-01-08 ENCOUNTER — WEB ENCOUNTER (OUTPATIENT)
Dept: URBAN - METROPOLITAN AREA CLINIC 23 | Facility: CLINIC | Age: 46
End: 2021-01-08

## 2021-01-08 ENCOUNTER — OFFICE VISIT (OUTPATIENT)
Dept: URBAN - METROPOLITAN AREA CLINIC 23 | Facility: CLINIC | Age: 46
End: 2021-01-08
Payer: COMMERCIAL

## 2021-01-08 DIAGNOSIS — R10.30 LOWER ABDOMINAL PAIN: ICD-10-CM

## 2021-01-08 DIAGNOSIS — K58.9 IBS (IRRITABLE BOWEL SYNDROME) DIARRHEA PREDOMINANT: ICD-10-CM

## 2021-01-08 DIAGNOSIS — R10.9 ABDOMINAL CRAMPING: ICD-10-CM

## 2021-01-08 DIAGNOSIS — R14.0 ABDOMINAL BLOATING: ICD-10-CM

## 2021-01-08 DIAGNOSIS — R19.7 DIARRHEA: ICD-10-CM

## 2021-01-08 PROBLEM — 10743008 IRRITABLE BOWEL SYNDROME: Status: ACTIVE | Noted: 2021-01-08

## 2021-01-08 PROCEDURE — G8427 DOCREV CUR MEDS BY ELIG CLIN: HCPCS | Performed by: INTERNAL MEDICINE

## 2021-01-08 PROCEDURE — 99214 OFFICE O/P EST MOD 30 MIN: CPT | Performed by: INTERNAL MEDICINE

## 2021-01-08 PROCEDURE — G9903 PT SCRN TBCO ID AS NON USER: HCPCS | Performed by: INTERNAL MEDICINE

## 2021-01-08 PROCEDURE — G8482 FLU IMMUNIZE ORDER/ADMIN: HCPCS | Performed by: INTERNAL MEDICINE

## 2021-01-08 PROCEDURE — 74177 CT ABD & PELVIS W/CONTRAST: CPT | Performed by: INTERNAL MEDICINE

## 2021-01-08 PROCEDURE — G8420 CALC BMI NORM PARAMETERS: HCPCS | Performed by: INTERNAL MEDICINE

## 2021-01-08 RX ORDER — ALBUTEROL SULFATE 90 UG/1
1 PUFF AS NEEDED AEROSOL, METERED RESPIRATORY (INHALATION)
Status: ACTIVE | COMMUNITY

## 2021-01-08 RX ORDER — ESCITALOPRAM 5 MG/1
1 TABLET TABLET, FILM COATED ORAL ONCE A DAY
Status: ACTIVE | COMMUNITY

## 2021-01-08 RX ORDER — FUROSEMIDE 40 MG/1
1 TABLET TABLET ORAL ONCE A DAY
Status: ACTIVE | COMMUNITY

## 2021-01-08 RX ORDER — RIFAXIMIN 550 MG/1
1 TABLET TABLET ORAL THREE TIMES A DAY
Qty: 42 TABLETS | Refills: 3 | OUTPATIENT
Start: 2021-01-08 | End: 2021-03-05

## 2021-01-08 RX ORDER — QUETIAPINE FUMARATE 25 MG/1
1 TABLET AT BEDTIME TABLET ORAL ONCE A DAY
Status: ACTIVE | COMMUNITY

## 2021-01-08 RX ORDER — SPIRONOLACTONE 25 MG/1
1 TABLET TABLET, FILM COATED ORAL
Status: DISCONTINUED | COMMUNITY

## 2021-01-08 RX ORDER — SPIRONOLACTONE 100 MG/1
1 TABLET TABLET, FILM COATED ORAL ONCE A DAY
Status: ACTIVE | COMMUNITY

## 2021-01-08 RX ORDER — METOPROLOL SUCCINATE 50 MG
1 TABLET TABLET, EXTENDED RELEASE 24 HR ORAL ONCE A DAY
Status: DISCONTINUED | COMMUNITY

## 2021-01-08 RX ORDER — MOMETASONE FUROATE AND FORMOTEROL FUMARATE DIHYDRATE 100; 5 UG/1; UG/1
2 PUFFS AEROSOL RESPIRATORY (INHALATION) TWICE A DAY
Status: ACTIVE | COMMUNITY

## 2021-01-08 RX ORDER — LAMOTRIGINE 25 MG/1
2 IN AM AND 1 IN NOON TABLET ORAL
Status: ACTIVE | COMMUNITY

## 2021-01-08 RX ORDER — QUETIAPINE 25 MG/1
1 TABLET AT BEDTIME TABLET, FILM COATED ORAL ONCE A DAY
Status: DISCONTINUED | COMMUNITY

## 2021-01-08 RX ORDER — ATORVASTATIN CALCIUM 20 MG/1
1 TABLET TABLET, FILM COATED ORAL ONCE A DAY
Status: ACTIVE | COMMUNITY

## 2021-01-08 RX ORDER — BUSPIRONE HYDROCHLORIDE 5 MG/1
1 TABLET TABLET ORAL
Status: ACTIVE | COMMUNITY

## 2021-01-08 RX ORDER — GABAPENTIN 100 MG/1
4 PO QHS CAPSULE ORAL ONCE A DAY
Status: ACTIVE | COMMUNITY

## 2021-01-08 RX ORDER — CHOLECALCIFEROL TAB 50 MCG (2000 UNIT) 50 MCG
ONCE PER WEEK TAB ORAL
Status: ACTIVE | COMMUNITY

## 2021-01-08 RX ORDER — OMEPRAZOLE 40 MG/1
1 CAPSULE 30 MINUTES BEFORE MORNING MEAL CAPSULE, DELAYED RELEASE ORAL ONCE A DAY
Status: ACTIVE | COMMUNITY

## 2021-01-08 NOTE — HPI-TODAY'S VISIT:
- 46 yo  female who returns for follow-up - In 10/2020 she had normal fecal elastase and stool GI PCR panel was negative for infectious pathogens - Had a negative colonoscopy on 12/7/2020.  Had small hemorrhoids and 3 small hyperplastic colon polyps.  Random colon biopsies were negative for microscopic colitis.  Tandem EGD was also negative.  GE junction biopsies were suggestive of mild acid reflux changes at the microscopic level and were negative for Salamanca's esophagus.  Random esophageal, gastric, and duodenal biopsies were benign and negative for any significant pathology.  Previous surgical changes of fundoplication and pyloroplasty were noted.  I performed empiric bougie dilation of her esophagus up to 19 mm.   - States esophageal dilation did not help.  She is still complaining of the same symptoms.  Tried both IBgard and FDgard with only minimal relief of symptoms; FDgard worked better.  Has tried probiotics in the past including VSL #3 but states probiotics make symptoms worse.    Previous visit 10/26/20 - 46 yo  female who I am seeing today in a Telehealth video visit for follow-up - Has a h/o functional GI disorders, including IBS and gastroparesis, for which she was previously following with Dr. Susy Manrique, whom she last saw in 10/2014 - In 11/2014 she had hiatal hernia repair, fundoplication, and pyloroplasty performed by Dr. Yvon Deal at [facility] (she appears as Chanda Sarmiento in the [place] records).  She did well after that surgery.  However, today the patient complains of worsening GI symptoms for the past 3 months.  These include frequent nausea, vomiting, dysphagia, abdominal bloating, and early satiety.  Gets intermittent abdominal cramping.  Denies h/o diabetes.  Denies weight loss; states instead that she has been gaining weight. - Has been taking omeprazole 40 mg daily for the past 3 months, with only partial relief of symptoms - She also notes a change in bowel habits: diarrhea for the past 2 months, whereas previously she was constipated.  She is currently having 2-4 stools per day, ranging between loose and soft.  States she completed a course of antibiotics 2 weeks ago by her PCP.  She forgot what her PCP was treating. - Has autoimmune hepatitis for which she sees Dr. Jonas Issa; she last saw him 4 days ago and I have reviewed his office note.  Dr. Issa ordered bloodwork which is still pending.

## 2021-01-08 NOTE — PHYSICAL EXAM CONSTITUTIONAL:
Teaching-Supervisory Addendum-Brief   I participated in the following activities of this patients care: the medical history, the physical exam, medical decision making, the procedure.     I personally performed: supervision of the patient's care, the medical history, the physical exam, the medical decision making.     The case was discussed with: the resident    Procedures: I directly supervised any procedure(s) noted by resident    Evaluation and management service: I agree with the evaluation and management decisions made in this patient's care.                  Ron Ball MD  11/08/20 0440     well developed, well nourished , in no acute distress , ambulating without difficulty , normal communication ability

## 2021-01-15 ENCOUNTER — OFFICE VISIT (OUTPATIENT)
Dept: URBAN - METROPOLITAN AREA CLINIC 82 | Facility: CLINIC | Age: 46
End: 2021-01-15

## 2021-01-25 ENCOUNTER — LAB OUTSIDE AN ENCOUNTER (OUTPATIENT)
Dept: URBAN - METROPOLITAN AREA TELEHEALTH 2 | Facility: TELEHEALTH | Age: 46
End: 2021-01-25

## 2021-02-17 ENCOUNTER — TELEPHONE ENCOUNTER (OUTPATIENT)
Dept: URBAN - METROPOLITAN AREA CLINIC 23 | Facility: CLINIC | Age: 46
End: 2021-02-17

## 2021-02-17 RX ORDER — BUSPIRONE HYDROCHLORIDE 5 MG/1
1 TABLET TABLET ORAL
Status: ACTIVE | COMMUNITY

## 2021-02-17 RX ORDER — ALBUTEROL SULFATE 90 UG/1
1 PUFF AS NEEDED AEROSOL, METERED RESPIRATORY (INHALATION)
Status: ACTIVE | COMMUNITY

## 2021-02-17 RX ORDER — GABAPENTIN 100 MG/1
4 PO QHS CAPSULE ORAL ONCE A DAY
Status: ACTIVE | COMMUNITY

## 2021-02-17 RX ORDER — ATORVASTATIN CALCIUM 20 MG/1
1 TABLET TABLET, FILM COATED ORAL ONCE A DAY
Status: ACTIVE | COMMUNITY

## 2021-02-17 RX ORDER — DICYCLOMINE HYDROCHLORIDE 20 MG/1
1 TABLET TABLET ORAL
Qty: 360 | Refills: 3 | OUTPATIENT
Start: 2021-02-18 | End: 2022-02-13

## 2021-02-17 RX ORDER — ESCITALOPRAM 5 MG/1
1 TABLET TABLET, FILM COATED ORAL ONCE A DAY
Status: ACTIVE | COMMUNITY

## 2021-02-17 RX ORDER — QUETIAPINE FUMARATE 25 MG/1
1 TABLET AT BEDTIME TABLET ORAL ONCE A DAY
Status: ACTIVE | COMMUNITY

## 2021-02-17 RX ORDER — CHOLECALCIFEROL TAB 50 MCG (2000 UNIT) 50 MCG
ONCE PER WEEK TAB ORAL
Status: ACTIVE | COMMUNITY

## 2021-02-17 RX ORDER — SPIRONOLACTONE 100 MG/1
1 TABLET TABLET, FILM COATED ORAL ONCE A DAY
Status: ACTIVE | COMMUNITY

## 2021-02-17 RX ORDER — OMEPRAZOLE 40 MG/1
1 CAPSULE 30 MINUTES BEFORE MORNING MEAL CAPSULE, DELAYED RELEASE ORAL ONCE A DAY
Status: ACTIVE | COMMUNITY

## 2021-02-17 RX ORDER — RIFAXIMIN 550 MG/1
1 TABLET TABLET ORAL THREE TIMES A DAY
Qty: 42 TABLETS | Refills: 3 | Status: ACTIVE | COMMUNITY
Start: 2021-01-08 | End: 2021-03-05

## 2021-02-17 RX ORDER — MOMETASONE FUROATE AND FORMOTEROL FUMARATE DIHYDRATE 100; 5 UG/1; UG/1
2 PUFFS AEROSOL RESPIRATORY (INHALATION) TWICE A DAY
Status: ACTIVE | COMMUNITY

## 2021-02-17 RX ORDER — FUROSEMIDE 40 MG/1
1 TABLET TABLET ORAL ONCE A DAY
Status: ACTIVE | COMMUNITY

## 2021-02-17 RX ORDER — LAMOTRIGINE 25 MG/1
2 IN AM AND 1 IN NOON TABLET ORAL
Status: ACTIVE | COMMUNITY

## 2021-02-21 ENCOUNTER — TELEPHONE ENCOUNTER (OUTPATIENT)
Dept: URBAN - METROPOLITAN AREA CLINIC 92 | Facility: CLINIC | Age: 46
End: 2021-02-21

## 2021-02-21 NOTE — HPI-OTHER HISTORIES
Dear Sofie Washington, Jan 11: glu 171 elevated and show local md. cr 1.06 and na 137 and k 4.0 and cl 98 and co2 21 and ca 9.7 amd alb 4.6 and  tb 0.7 and alk 88 and ast 13 and alt 16. So liver labs doing very well. A1c 5.9% noted.  Dr Issa

## 2021-02-22 ENCOUNTER — OFFICE VISIT (OUTPATIENT)
Dept: URBAN - METROPOLITAN AREA TELEHEALTH 2 | Facility: TELEHEALTH | Age: 46
End: 2021-02-22

## 2021-02-22 RX ORDER — OMEPRAZOLE 40 MG/1
1 CAPSULE 30 MINUTES BEFORE MORNING MEAL CAPSULE, DELAYED RELEASE ORAL ONCE A DAY
Status: ACTIVE | COMMUNITY

## 2021-02-22 RX ORDER — RIFAXIMIN 550 MG/1
1 TABLET TABLET ORAL THREE TIMES A DAY
Qty: 42 TABLETS | Refills: 3 | Status: ACTIVE | COMMUNITY
Start: 2021-01-08 | End: 2021-03-05

## 2021-02-22 RX ORDER — ESCITALOPRAM 5 MG/1
1 TABLET TABLET, FILM COATED ORAL ONCE A DAY
Status: ACTIVE | COMMUNITY

## 2021-02-22 RX ORDER — LAMOTRIGINE 25 MG/1
2 IN AM AND 1 IN NOON TABLET ORAL
Status: ACTIVE | COMMUNITY

## 2021-02-22 RX ORDER — ALBUTEROL SULFATE 90 UG/1
1 PUFF AS NEEDED AEROSOL, METERED RESPIRATORY (INHALATION)
Status: ACTIVE | COMMUNITY

## 2021-02-22 RX ORDER — FUROSEMIDE 40 MG/1
1 TABLET TABLET ORAL ONCE A DAY
Status: ACTIVE | COMMUNITY

## 2021-02-22 RX ORDER — QUETIAPINE FUMARATE 25 MG/1
1 TABLET AT BEDTIME TABLET ORAL ONCE A DAY
Status: ACTIVE | COMMUNITY

## 2021-02-22 RX ORDER — MOMETASONE FUROATE AND FORMOTEROL FUMARATE DIHYDRATE 100; 5 UG/1; UG/1
2 PUFFS AEROSOL RESPIRATORY (INHALATION) TWICE A DAY
Status: ACTIVE | COMMUNITY

## 2021-02-22 RX ORDER — CHOLECALCIFEROL TAB 50 MCG (2000 UNIT) 50 MCG
ONCE PER WEEK TAB ORAL
Status: ACTIVE | COMMUNITY

## 2021-02-22 RX ORDER — SPIRONOLACTONE 100 MG/1
1 TABLET TABLET, FILM COATED ORAL ONCE A DAY
Status: ACTIVE | COMMUNITY

## 2021-02-22 RX ORDER — GABAPENTIN 100 MG/1
4 PO QHS CAPSULE ORAL ONCE A DAY
Status: ACTIVE | COMMUNITY

## 2021-02-22 RX ORDER — DICYCLOMINE HYDROCHLORIDE 20 MG/1
1 TABLET TABLET ORAL
Qty: 360 | Refills: 3 | Status: ACTIVE | COMMUNITY
Start: 2021-02-18 | End: 2022-02-13

## 2021-02-22 RX ORDER — ATORVASTATIN CALCIUM 20 MG/1
1 TABLET TABLET, FILM COATED ORAL ONCE A DAY
Status: ACTIVE | COMMUNITY

## 2021-02-22 RX ORDER — BUSPIRONE HYDROCHLORIDE 5 MG/1
1 TABLET TABLET ORAL
Status: ACTIVE | COMMUNITY

## 2021-02-22 NOTE — HPI-OTHER HISTORIES
This is a scheduled new visit appointment for this patient, a 45 year old female , after a previous visit on 2015, for an evaluation for abnormal liver enzymes and suspected autoimmune like hepatitis with multiple episodes drug induced hepatitis. She was drinking green tea for a few weeks. She has an immune issue and so the labs spiked up. Stopped and labs dropping. Nov 25 she did labs. Nov 9 labs: wbc 13. 4hg 14 and plat 453 elevated slightly (150-450 normal). na 135 k 4.6 and cl 97 and co2 26 and calcium elevated 11.0. Show to local md: why is calcium up in you?alb 4.5 and tb 0.6 and alk 165 elevated and ast 22 and alt 36 so back down. chol 336 elevated and trg 307 and ldl 219. Lipids off and not sure if due to recent liver flare or other and may want to redo that as known if liver flared that this can be off. Vit d low 28.8. a1c 5.9%. She says has had low vit d and she was to start this and she was started on 5000 a day for 4 m. Nov 3: ast 50 and alt 121 (both down then from 121 and 222). main she is off green tea and the lamictal also off prior. Suspect gree tea was immune stimulant revving up your immune system. She has a very active immune system. She did start back on lamictal and the seroquel 25 mg qhs. 11-2: ast down to 50 and alt 118 and prior 121 and alt 222 . alk 213 still up from 210 and tb down to 0.4 from 0.5. Oct 26 labs tb 0.5 and alk 210 and ast 121 and alt 222. alk 248 and so now 210 so that is better, and ast 128 and that is now 121 and alt was 248 and now 222 so better and we need to follow. Oct 19 labs spiked again: alk 198 and ast 128 and alt 248. Tb 0.5. na 136 and k k 4.5. bun 15 and cr 0.94 and glu 112. Oct 6 ast 16 and alt 25 and alk 111. Prior she was also on abx for 6 weeks and so finished oct 8 or so and so that not the cause. Oct 6: wbc 11.2 little up, hg 14.4 and hct 42.2, plat 141. Mcv 90, glu 103 little up and maybe not fasting. Bun 20 and cr 0.98 and na 135 and k 4.5, cl 99 and co2 22. Alb 4.2 and tb 0.5 and alk 111 and ast 16 and alt 25. Ideal alt She says she got final decree for divorce and has need for citizenship paper to get this. She says lamictal came down from 75/50 to 50/25 oct 9 and added gabapentin 400mg and seoquel 25mg po qhs an atorvastatin 40mg po qhs. Off the cocaine also. Aunt Ewelina had oltx and she had failed from 2nd tx. Not from covid 19. She has been in touch with family. Interval testing: [facility]: they see mild fatty infiltration of the liver. Prior gb surgery changes seen. Visualized pancreas portions normal. No liver masses. Common duct 4mm. Portal vein patent. Kidneys unremarkable. Spleen 7.8cm unremarkable. Should get better as time passes from the issues we discussed. Aug 4 2020 labs: glu 61 and little low bun 17 and cr 0.91 and na 135 and k 5.2 and cl 96 and co2 20 and ca 10.9 elevated and show local md. alb 4.7 and tb 0.6 and alk 104 and ast 24 and alt 28. ideal alt She does not feel well and eyes puffy. Trouble sleeping and memory off. She was on ursodiol for has been on it for her liver started july 29 and she will stop it and see if labs stay well as having a lot of side effects. She stopped this. Nov 25 2020: wbc 11.3 and hg 14.1 plat 484 elevated and neutrophils 7.7 and glu 144 elevated and cr 1.08 little up.na 139 and k 4.3 and cl 99 and co2 24 and ca 10 and alb 4,7 and tb 0.3 and alk 130 and ast 15 and alt 18. plan: 1. Check again the new labs soon. 2. Plan for labs in Jan near end and see in Feb. 3. Ursodiol if staill very sensisive. Stressed to pt the need for social distancing and strict handwashing and wearing a mask and to follow any other new or added CDC recommendations as this is an evolving target. Duration of visit was  minutes via [employer] video devonte with greater than 50% of time spent reviewing chart and events with the patient.

## 2021-02-24 ENCOUNTER — TELEPHONE ENCOUNTER (OUTPATIENT)
Dept: URBAN - METROPOLITAN AREA CLINIC 23 | Facility: CLINIC | Age: 46
End: 2021-02-24

## 2021-02-24 ENCOUNTER — OFFICE VISIT (OUTPATIENT)
Dept: URBAN - METROPOLITAN AREA TELEHEALTH 2 | Facility: TELEHEALTH | Age: 46
End: 2021-02-24
Payer: COMMERCIAL

## 2021-02-24 ENCOUNTER — LAB OUTSIDE AN ENCOUNTER (OUTPATIENT)
Dept: URBAN - METROPOLITAN AREA TELEHEALTH 2 | Facility: TELEHEALTH | Age: 46
End: 2021-02-24

## 2021-02-24 DIAGNOSIS — E66.3 OVERWEIGHT: ICD-10-CM

## 2021-02-24 DIAGNOSIS — F10.11 ALCOHOL USE DISORDER, MILD, IN EARLY REMISSION, ABUSE: ICD-10-CM

## 2021-02-24 DIAGNOSIS — K71.9 DRUG-INDUCED LIVER DISEASE: ICD-10-CM

## 2021-02-24 DIAGNOSIS — G44.009: ICD-10-CM

## 2021-02-24 DIAGNOSIS — K75.4 AUTOIMMUNE HEPATITIS: ICD-10-CM

## 2021-02-24 DIAGNOSIS — Z98.890 HISTORY OF FUNDOPLICATION: ICD-10-CM

## 2021-02-24 DIAGNOSIS — R74.8 ABNORMAL LIVER ENZYMES: ICD-10-CM

## 2021-02-24 DIAGNOSIS — Z79.899 HIGH RISK MEDICATION USE: ICD-10-CM

## 2021-02-24 DIAGNOSIS — Z71.6 TOBACCO ABUSE COUNSELING: ICD-10-CM

## 2021-02-24 DIAGNOSIS — Z71.89 VACCINE COUNSELING: ICD-10-CM

## 2021-02-24 DIAGNOSIS — F31.30 BIPOLAR AFFECTIVE DISORDER, CURRENT EPISODE DEPRESSED, CURRENT EPISODE SEVERITY UNSPECIFIED: ICD-10-CM

## 2021-02-24 DIAGNOSIS — D80.2 IGA DEFICIENCY: ICD-10-CM

## 2021-02-24 PROCEDURE — 99214 OFFICE O/P EST MOD 30 MIN: CPT

## 2021-02-24 RX ORDER — OMEPRAZOLE 40 MG/1
1 CAPSULE 30 MINUTES BEFORE MORNING MEAL CAPSULE, DELAYED RELEASE ORAL ONCE A DAY
Status: ACTIVE | COMMUNITY

## 2021-02-24 RX ORDER — ALBUTEROL SULFATE 90 UG/1
1 PUFF AS NEEDED AEROSOL, METERED RESPIRATORY (INHALATION)
Status: ACTIVE | COMMUNITY

## 2021-02-24 RX ORDER — SPIRONOLACTONE 100 MG/1
1 TABLET TABLET, FILM COATED ORAL ONCE A DAY
Status: ACTIVE | COMMUNITY

## 2021-02-24 RX ORDER — RIFAXIMIN 550 MG/1
1 TABLET TABLET ORAL THREE TIMES A DAY
Qty: 42 TABLETS | Refills: 3 | Status: DISCONTINUED | COMMUNITY
Start: 2021-01-08 | End: 2021-03-05

## 2021-02-24 RX ORDER — ESCITALOPRAM 5 MG/1
1 TABLET TABLET, FILM COATED ORAL ONCE A DAY
Status: ACTIVE | COMMUNITY

## 2021-02-24 RX ORDER — ATORVASTATIN CALCIUM 20 MG/1
1 TABLET TABLET, FILM COATED ORAL ONCE A DAY
Status: ACTIVE | COMMUNITY

## 2021-02-24 RX ORDER — BUSPIRONE HYDROCHLORIDE 5 MG/1
1 TABLET TABLET ORAL
Status: ACTIVE | COMMUNITY

## 2021-02-24 RX ORDER — QUETIAPINE FUMARATE 25 MG/1
1 TABLET AT BEDTIME TABLET ORAL ONCE A DAY
Status: ACTIVE | COMMUNITY

## 2021-02-24 RX ORDER — MOMETASONE FUROATE AND FORMOTEROL FUMARATE DIHYDRATE 100; 5 UG/1; UG/1
2 PUFFS AEROSOL RESPIRATORY (INHALATION) TWICE A DAY
Status: ACTIVE | COMMUNITY

## 2021-02-24 RX ORDER — LAMOTRIGINE 25 MG/1
2 IN AM AND 1 IN NOON TABLET ORAL
Status: ACTIVE | COMMUNITY

## 2021-02-24 RX ORDER — FUROSEMIDE 40 MG/1
1 TABLET TABLET ORAL ONCE A DAY
Status: ACTIVE | COMMUNITY

## 2021-02-24 RX ORDER — GABAPENTIN 100 MG/1
4 PO QHS CAPSULE ORAL ONCE A DAY
Status: ACTIVE | COMMUNITY

## 2021-02-24 RX ORDER — DICYCLOMINE HYDROCHLORIDE 20 MG/1
1 TABLET TABLET ORAL
Qty: 360 | Refills: 3 | Status: DISCONTINUED | COMMUNITY
Start: 2021-02-18 | End: 2022-02-13

## 2021-02-24 RX ORDER — CHOLECALCIFEROL TAB 50 MCG (2000 UNIT) 50 MCG
ONCE PER WEEK TAB ORAL
Status: ACTIVE | COMMUNITY

## 2021-02-24 NOTE — HPI-OTHER HISTORIES
This is a scheduled new visit appointment for this patient, a 45 year old female , after a previous visit on 2015, for an evaluation for abnormal liver enzymes and suspected autoimmune like hepatitis with multiple episodes drug induced hepatitis.   She was drinking green tea for a few weeks.  She has an immune issue and so the labs spiked up.  Stopped and labs dropping.    Jan 11: glu 171 elevated and show local md. cr 1.06 and na 137 and k 4.0 and cl 98 and co2 21 and ca 9.7 normal and alb 4.6 and  tb 0.7 and alk 88 and ast 13 and alt 16. So liver labs doing very well. A1c 5.9% noted.  Jan 20 2021: ct no cardiomegaly. Clear lung bases. Unremarkable liver. Prior gb surgeyr changes. Unremarkable spleen. Pancreas and and adrenal glands normal. cyst right kidney.  left renal cyst.  Mild rectosigmoid wall thickening. Liver vessels patent. No bulk adenopathy. Uterine hypodensity 4.4x2.9x4.1cm inlower uterine segment and cervix.   She saw ob gyn for this and looking at options.  She says that mesh was to be seen for this. She says she feels like mesh moved.  Nov 9 labs: wbc 13. 4hg 14 and plat 453 elevated slightly (150-450 normal). na 135 k 4.6 and cl 97 and co2 26 and calcium elevated 11.0. Show to local md: why is calcium up?alb 4.5 and tb 0.6 and alk 165 elevated and ast 22 and alt 36 so back down. chol 336 elevated and trg 307 and ldl 219. Lipids off and not sure if due to recent liver flare or other and may want to redo that as known if liver flared that this can be off. Vit d low 28.8. a1c 5.9%.   She says has had low vit d and she was to start this and she was started on 5000 a day for 4 m and she has been taking some vit d.  She says was on doxycycline and said had a prior hx of tcn reaction and filled and she said she may have been more ill from it.  Nov 3 2020 : ast 50 and alt 121 (both down then from 121 and 222). main she is off green tea and the lamictal also off prior.   Suspect gree tea was immune stimulant revving up your immune system.  She has a very active immune system.   She is staying on lamictal 25 and 50mg for 75 mg a day.  Rheum says she has fibromyalgia and doing water therapy.  11-2-20: ast down to 50 and alt 118 and prior 121 and alt 222 . alk 213 still up from 210 and tb down to 0.4 from 0.5.   Oct 26 labs tb 0.5 and alk 210 and ast 121 and alt 222. alk 248 and so now 210 so that is better, and ast 128 and that is now 121 and alt was 248 and now 222 so better and we need to follow.   Oct 19 labs spiked again: alk 198 and ast 128 and alt 248. Tb 0.5. na 136 and k k 4.5. bun 15 and cr 0.94 and glu 112.   Oct 6 ast 16 and alt 25 and alk 111. Prior she was also on abx for 6 weeks and so finished oct 8 or so and so that not the cause.   Oct 6: wbc 11.2 little up, hg 14.4 and hct 42.2, plat 141. Mcv 90, glu 103 little up and maybe not fasting. Bun 20 and cr 0.98 and na 135 and k 4.5, cl 99 and co2 22. Alb 4.2 and tb 0.5 and alk 111 and ast 16 and alt 25.  She says she got final decree for divorce and she has need for citizenship paper to get this.   She says lamictal came down from 75/50 to 50/25 as of oct 9 and added gabapentin 400mg and seroquel 25mg po qhs an atorvastatin 40mg po qhs.   Off the prior nasal exposures also.   Aunt Ewelina passed in oct 2020 had oltx and she had failed from 2nd tx. Not from covid 19.   [facility]: they saw mild fatty infiltration of the liver. Prior gb surgery changes seen. Visualized pancreas portions normal. No liver masses. Common duct 4mm. Portal vein patent. Kidneys unremarkable. Spleen 7.8cm unremarkable. Should get better as time passes from the issues we discussed.   Aug 4 2020 labs: glu 61 and little low bun 17 and cr 0.91 and na 135 and k 5.2 and cl 96 and co2 20 and ca 10.9 elevated and show local md. alb 4.7 and tb 0.6 and alk 104 and ast 24 and alt 28.   She was on ursodiol for has been on it for her liver started july 29 and she did stop it  due to gi issues and see if labs stay well as having a lot of side effects. Still off this now.  Nov 25 2020: wbc 11.3 and hg 14.1 plat 484 elevated and neutrophils 7.7 and glu 144 elevated and cr 1.08 little up.na 139 and k 4.3 and cl 99 and co2 24 and ca 10 and alb 4,7 and tb 0.3 and alk 130 and ast 15 and alt 18.   Plan:   1. Check again the labs in 3m and 6m. 2. Do the [facility] and see if the liver back to normal or is it fatty, 3. RTC 6m. 4. Call if new meds. 5. ursodiol allowed to tolerate meds better.  Stressed to pt the need for social distancing and strict handwashing and wearing a mask and to follow any other new or added CDC recommendations as this is an evolving target.   Duration of visit was 36 minutes via [employer] video devonte with greater than 50% of time spent reviewing chart and events with the patient.

## 2021-03-22 ENCOUNTER — LAB OUTSIDE AN ENCOUNTER (OUTPATIENT)
Dept: URBAN - METROPOLITAN AREA TELEHEALTH 2 | Facility: TELEHEALTH | Age: 46
End: 2021-03-22

## 2021-04-10 ENCOUNTER — TELEPHONE ENCOUNTER (OUTPATIENT)
Dept: URBAN - METROPOLITAN AREA CLINIC 92 | Facility: CLINIC | Age: 46
End: 2021-04-10

## 2021-04-10 NOTE — HPI-TODAY'S VISIT:
Dear Chanda Segura, April 6, 2021 u.s at [facility]: pancreas appears normal where seen and liver demonstrates normal size and contour and echogenicity. Liver vessels patent. Gallbladder absent. Common bile duct 4mm. No hydronephrosis seen to kidneys. Spleen 8.4cm. No liver mass seen.  Dr Issa

## 2021-05-15 ENCOUNTER — TELEPHONE ENCOUNTER (OUTPATIENT)
Dept: URBAN - METROPOLITAN AREA CLINIC 92 | Facility: CLINIC | Age: 46
End: 2021-05-15

## 2021-05-15 NOTE — HPI-TODAY'S VISIT:
Dear Chanda Segura,  May 3:wbc 9.6 hg 13.7 and plat 355 and mcv 93.  Neutrophils 6.5. glu 92 and cr 0.95 and na 137 and k 4.3 and ca 9.8 and alb 4.5 and tb 0.3 and alk 177 elevated amnd ast 30 and alt 70 and tesosterone level 6 normal per chart for your age.   Prior alk 277 and ast 126 and alt 315.  So came down post that doxycycline exposure.  I would redo the labs in  2-3 weeks.  Dr Issa

## 2021-05-17 ENCOUNTER — LAB OUTSIDE AN ENCOUNTER (OUTPATIENT)
Dept: URBAN - METROPOLITAN AREA TELEHEALTH 2 | Facility: TELEHEALTH | Age: 46
End: 2021-05-17

## 2021-05-31 ENCOUNTER — LAB OUTSIDE AN ENCOUNTER (OUTPATIENT)
Dept: URBAN - METROPOLITAN AREA CLINIC 92 | Facility: CLINIC | Age: 46
End: 2021-05-31

## 2021-06-21 ENCOUNTER — OFFICE VISIT (OUTPATIENT)
Dept: URBAN - METROPOLITAN AREA CLINIC 23 | Facility: CLINIC | Age: 46
End: 2021-06-21

## 2021-06-21 ENCOUNTER — OFFICE VISIT (OUTPATIENT)
Dept: URBAN - METROPOLITAN AREA CLINIC 23 | Facility: CLINIC | Age: 46
End: 2021-06-21
Payer: COMMERCIAL

## 2021-06-21 VITALS
HEART RATE: 72 BPM | SYSTOLIC BLOOD PRESSURE: 116 MMHG | WEIGHT: 150.6 LBS | BODY MASS INDEX: 27.71 KG/M2 | HEIGHT: 62 IN | DIASTOLIC BLOOD PRESSURE: 79 MMHG | TEMPERATURE: 97.5 F

## 2021-06-21 DIAGNOSIS — K58.9 IRRITABLE BOWEL SYNDROME (IBS): ICD-10-CM

## 2021-06-21 DIAGNOSIS — R13.10 DYSPHAGIA: ICD-10-CM

## 2021-06-21 PROBLEM — 10743008 IRRITABLE BOWEL SYNDROME: Status: ACTIVE | Noted: 2021-02-18

## 2021-06-21 PROBLEM — 40739000 DYSPHAGIA: Status: ACTIVE | Noted: 2020-10-26

## 2021-06-21 PROCEDURE — 99213 OFFICE O/P EST LOW 20 MIN: CPT | Performed by: INTERNAL MEDICINE

## 2021-06-21 RX ORDER — LAMOTRIGINE 25 MG/1
2 IN AM AND 1 IN NOON TABLET ORAL
Status: ACTIVE | COMMUNITY

## 2021-06-21 RX ORDER — ESCITALOPRAM 5 MG/1
1 TABLET TABLET, FILM COATED ORAL ONCE A DAY
Status: ACTIVE | COMMUNITY

## 2021-06-21 RX ORDER — BUSPIRONE HYDROCHLORIDE 5 MG/1
1 TABLET TABLET ORAL
Status: ACTIVE | COMMUNITY

## 2021-06-21 RX ORDER — SPIRONOLACTONE 100 MG/1
1 TABLET TABLET, FILM COATED ORAL ONCE A DAY
Status: ACTIVE | COMMUNITY

## 2021-06-21 RX ORDER — GABAPENTIN 100 MG/1
4 PO QHS CAPSULE ORAL ONCE A DAY
Status: ACTIVE | COMMUNITY

## 2021-06-21 RX ORDER — ALBUTEROL SULFATE 90 UG/1
1 PUFF AS NEEDED AEROSOL, METERED RESPIRATORY (INHALATION)
Status: ACTIVE | COMMUNITY

## 2021-06-21 RX ORDER — FUROSEMIDE 40 MG/1
1 TABLET TABLET ORAL ONCE A DAY
Status: ACTIVE | COMMUNITY

## 2021-06-21 RX ORDER — CHOLECALCIFEROL TAB 50 MCG (2000 UNIT) 50 MCG
ONCE PER WEEK TAB ORAL
Status: ACTIVE | COMMUNITY

## 2021-06-21 RX ORDER — MOMETASONE FUROATE AND FORMOTEROL FUMARATE DIHYDRATE 100; 5 UG/1; UG/1
2 PUFFS AEROSOL RESPIRATORY (INHALATION) TWICE A DAY
Status: ACTIVE | COMMUNITY

## 2021-06-21 RX ORDER — LAMOTRIGINE 25 MG/1
1 TABLET TABLET, FILM COATED, EXTENDED RELEASE ORAL
Status: ACTIVE | COMMUNITY

## 2021-06-21 RX ORDER — QUETIAPINE FUMARATE 25 MG/1
1 TABLET AT BEDTIME TABLET ORAL ONCE A DAY
Status: ACTIVE | COMMUNITY

## 2021-06-21 RX ORDER — ATORVASTATIN CALCIUM 20 MG/1
1 TABLET TABLET, FILM COATED ORAL ONCE A DAY
Status: ACTIVE | COMMUNITY

## 2021-06-21 RX ORDER — OMEPRAZOLE 40 MG/1
1 CAPSULE 30 MINUTES BEFORE MORNING MEAL CAPSULE, DELAYED RELEASE ORAL ONCE A DAY
Status: ACTIVE | COMMUNITY

## 2021-06-21 NOTE — HPI-TODAY'S VISIT:
- 46 yo  female who returns for follow-up - She is having recurrent dysphagia symptoms every time she eats.  She has dysphagia to both solids and liquids.  Feels foods are sticking in her neck.  She also complains of hoarseness. - I had done previous EGD for her in 12/2020.  At that time I did bougie dilation of her esophagus up to 19 mm.  No mucosal tears in the esophagus or cricopharyngeus occurred.  Hence there were no occult strictures.  She states that esophageal dilation did not really help her (only for 2 days).  Random esophageal biopsies were negative for eosinophilic esophagitis. - States she tried Xifaxan which did not help her IBS symptoms

## 2021-07-16 ENCOUNTER — TELEPHONE ENCOUNTER (OUTPATIENT)
Dept: URBAN - METROPOLITAN AREA CLINIC 23 | Facility: CLINIC | Age: 46
End: 2021-07-16

## 2021-07-17 ENCOUNTER — LAB OUTSIDE AN ENCOUNTER (OUTPATIENT)
Dept: URBAN - METROPOLITAN AREA TELEHEALTH 2 | Facility: TELEHEALTH | Age: 46
End: 2021-07-17

## 2021-07-19 ENCOUNTER — LAB OUTSIDE AN ENCOUNTER (OUTPATIENT)
Dept: URBAN - METROPOLITAN AREA CLINIC 23 | Facility: CLINIC | Age: 46
End: 2021-07-19

## 2021-08-10 ENCOUNTER — TELEPHONE ENCOUNTER (OUTPATIENT)
Dept: URBAN - METROPOLITAN AREA CLINIC 23 | Facility: CLINIC | Age: 46
End: 2021-08-10

## 2021-08-23 ENCOUNTER — LAB OUTSIDE AN ENCOUNTER (OUTPATIENT)
Dept: URBAN - METROPOLITAN AREA TELEHEALTH 2 | Facility: TELEHEALTH | Age: 46
End: 2021-08-23

## 2021-08-23 ENCOUNTER — OFFICE VISIT (OUTPATIENT)
Dept: URBAN - METROPOLITAN AREA TELEHEALTH 2 | Facility: TELEHEALTH | Age: 46
End: 2021-08-23
Payer: COMMERCIAL

## 2021-08-23 DIAGNOSIS — D80.2 IGA DEFICIENCY: ICD-10-CM

## 2021-08-23 DIAGNOSIS — R74.8 ABNORMAL LIVER ENZYMES: ICD-10-CM

## 2021-08-23 DIAGNOSIS — F10.11 ALCOHOL USE DISORDER, MILD, IN EARLY REMISSION, ABUSE: ICD-10-CM

## 2021-08-23 DIAGNOSIS — Z71.6 TOBACCO ABUSE COUNSELING: ICD-10-CM

## 2021-08-23 DIAGNOSIS — G44.009: ICD-10-CM

## 2021-08-23 DIAGNOSIS — K71.9 DRUG-INDUCED LIVER DISEASE: ICD-10-CM

## 2021-08-23 DIAGNOSIS — Z71.89 VACCINE COUNSELING: ICD-10-CM

## 2021-08-23 DIAGNOSIS — E66.3 OVERWEIGHT: ICD-10-CM

## 2021-08-23 DIAGNOSIS — K75.4 AUTOIMMUNE HEPATITIS: ICD-10-CM

## 2021-08-23 DIAGNOSIS — Z79.899 HIGH RISK MEDICATION USE: ICD-10-CM

## 2021-08-23 DIAGNOSIS — F31.30 BIPOLAR AFFECTIVE DISORDER, CURRENT EPISODE DEPRESSED, CURRENT EPISODE SEVERITY UNSPECIFIED: ICD-10-CM

## 2021-08-23 DIAGNOSIS — Z98.890 HISTORY OF FUNDOPLICATION: ICD-10-CM

## 2021-08-23 PROCEDURE — 99214 OFFICE O/P EST MOD 30 MIN: CPT

## 2021-08-23 RX ORDER — ESCITALOPRAM 5 MG/1
1 TABLET TABLET, FILM COATED ORAL ONCE A DAY
Status: DISCONTINUED | COMMUNITY

## 2021-08-23 RX ORDER — QUETIAPINE FUMARATE 50 MG/1
1 TABLET AT BEDTIME TABLET ORAL ONCE A DAY
Status: ACTIVE | COMMUNITY

## 2021-08-23 RX ORDER — LAMOTRIGINE 25 MG/1
2 IN AM AND 1 IN NOON TABLET ORAL
Status: DISCONTINUED | COMMUNITY

## 2021-08-23 RX ORDER — FUROSEMIDE 40 MG/1
1 TABLET TABLET ORAL ONCE A DAY
Status: ACTIVE | COMMUNITY

## 2021-08-23 RX ORDER — GABAPENTIN 100 MG/1
4 PO QHS CAPSULE ORAL ONCE A DAY
Status: DISCONTINUED | COMMUNITY

## 2021-08-23 RX ORDER — ALBUTEROL SULFATE 90 UG/1
1 PUFF AS NEEDED AEROSOL, METERED RESPIRATORY (INHALATION)
Status: ACTIVE | COMMUNITY

## 2021-08-23 RX ORDER — OMEPRAZOLE 40 MG/1
1 CAPSULE 30 MINUTES BEFORE MORNING MEAL CAPSULE, DELAYED RELEASE ORAL ONCE A DAY
Status: DISCONTINUED | COMMUNITY

## 2021-08-23 RX ORDER — PREGABALIN 75 MG/1
1 CAPSULE CAPSULE ORAL TWICE A DAY
Status: ACTIVE | COMMUNITY

## 2021-08-23 RX ORDER — BUSPIRONE HYDROCHLORIDE 5 MG/1
1 TABLET TABLET ORAL
Status: ACTIVE | COMMUNITY

## 2021-08-23 RX ORDER — SPIRONOLACTONE 100 MG/1
1 TABLET TABLET, FILM COATED ORAL ONCE A DAY
Status: ACTIVE | COMMUNITY

## 2021-08-23 RX ORDER — LAMOTRIGINE 100 MG/1
1.5 TABLET TABLET, FILM COATED, EXTENDED RELEASE ORAL ONCE A DAY
Status: ACTIVE | COMMUNITY

## 2021-08-23 RX ORDER — CHOLECALCIFEROL TAB 50 MCG (2000 UNIT) 50 MCG
ONCE PER WEEK TAB ORAL
Status: ON HOLD | COMMUNITY

## 2021-08-23 RX ORDER — ETANERCEPT 50 MG/ML
FULL VIAL ONCE A WEEK AS DIRECTED SOLUTION SUBCUTANEOUS
Status: ACTIVE | COMMUNITY

## 2021-08-23 RX ORDER — MOMETASONE FUROATE AND FORMOTEROL FUMARATE DIHYDRATE 100; 5 UG/1; UG/1
2 PUFFS AEROSOL RESPIRATORY (INHALATION) TWICE A DAY
Status: DISCONTINUED | COMMUNITY

## 2021-08-23 RX ORDER — ATORVASTATIN CALCIUM 20 MG/1
1 TABLET TABLET, FILM COATED ORAL ONCE A DAY
Status: ACTIVE | COMMUNITY

## 2021-08-23 RX ORDER — BUDESONIDE AND FORMOTEROL FUMARATE DIHYDRATE 160; 4.5 UG/1; UG/1
2 PUFFS AEROSOL RESPIRATORY (INHALATION) TWICE A DAY
Status: ACTIVE | COMMUNITY

## 2021-08-23 NOTE — HPI-OTHER HISTORIES
This is a scheduled new visit appointment for this patient, a 45 year old female , after a previous visit on feb 2021 for an evaluation for abnormal liver enzymes and suspected autoimmune like hepatitis with multiple episodes drug induced hepatitis.   RECAP : She was drinking green tea for a few weeks.  She has an immune issue and so the labs spiked up. then stopped and labs settled and now coming for visit.  Interval notes: Pt was to do a bx and trouble breathing and Dr Self told to do barium swallow and manometry and did the barium and to do the manometry.  Ent saw some plaques and she had bx done.  Pelvic area pain noted and se started with pain and went to er and went [facility] to [facility] and she said cyst in fallopian tube and ruptured and did a full hysterectomy.  Ex  burned her citizenship certificate.  Need updated labs.  Enbrel started  for seronegative RA.  She says needed onc re wbc up and asked him to do the lfts and she says part of labs back and she has from aug 8 2021. Ast and alt done and alk 209 and prior 138 and ast 149 and alt prior 34 and alt 231 and 76 july 19. b12 1804 and IGA 68 little low.  Could be lower now as that was near the surgery.   May 3:wbc 9.6 hg 13.7 and plat 355 and mcv 93.  Neutrophils 6.5. glu 92 and cr 0.95 and na 137 and k 4.3 and ca 9.8 and alb 4.5 and tb 0.3 and alk 177 elevated and ast 30 and alt 70 and tesosterone level 6 normal per chart for your age.   Prior alk 277 and ast 126 and alt 315.  So came down post that doxycycline exposure.   April 6, 2021 [facility] at [facility]: pancreas appears normal where seen and liver demonstrates normal size and contour and echogenicity. Liver vessels patent. Gallbladder absent. Common bile duct 4mm. No hydronephrosis seen to kidneys. Spleen 8.4cm. No liver mass seen.  Jan 11: glu 171 elevated and show local md. cr 1.06 and na 137 and k 4.0 and cl 98 and co2 21 and ca 9.7 normal and alb 4.6 and  tb 0.7 and alk 88 and ast 13 and alt 16. So liver labs doing very well. A1c 5.9% noted.  Jan 20 2021: ct no cardiomegaly. Clear lung bases. Unremarkable liver. Prior gb surgery changes. Unremarkable spleen. Pancreas and and adrenal glands normal. cyst right kidney.  left renal cyst.  Mild rectosigmoid wall thickening. Liver vessels patent. No bulk adenopathy. Uterine hypodensity 4.4x2.9x4.1cm inlower uterine segment and cervix.   She saw ob gyn for this and looking at options.  She says that mesh was to be seen for this. She says she feels like mesh moved.  Nov 9 labs: wbc 13. 4hg 14 and plat 453 elevated slightly (150-450 normal). na 135 k 4.6 and cl 97 and co2 26 and calcium elevated 11.0. Show to local md: why is calcium up?alb 4.5 and tb 0.6 and alk 165 elevated and ast 22 and alt 36 so back down. chol 336 elevated and trg 307 and ldl 219. Lipids off and not sure if due to recent liver flare or other and may want to redo that as known if liver flared that this can be off. Vit d low 28.8. a1c 5.9%.   She says has had low vit d and she was to start this and she was started on 5000 a day for 4 m and she has been taking some vit d.  She says was on doxycycline and said had a prior hx of tcn reaction and filled and she said she may have been more ill from it. No reexposure.  Nov 3 2020 : ast 50 and alt 121 (both down then from 121 and 222). main she is off green tea and the lamictal also off prior.   Suspect green tea was immune stimulant reving up your immune system.  She has a very active immune system.   Rheum says she has fibromyalgia and doing water therapy.  11-2-20: ast down to 50 and alt 118 and prior 121 and alt 222 . alk 213 still up from 210 and tb down to 0.4 from 0.5.   Oct 26 labs tb 0.5 and alk 210 and ast 121 and alt 222. alk 248 and so now 210 so that is better, and ast 128 and that is now 121 and alt was 248 and now 222 so better and we need to follow.   Oct 19 labs spiked again: alk 198 and ast 128 and alt 248. Tb 0.5. na 136 and k k 4.5. bun 15 and cr 0.94 and glu 112.   Oct 6 ast 16 and alt 25 and alk 111. Prior she was also on abx for 6 weeks and so finished oct 8 or so and so that not the cause.   Oct 6: wbc 11.2 little up, hg 14.4 and hct 42.2, plat 141. Mcv 90, glu 103 little up and maybe not fasting. Bun 20 and cr 0.98 and na 135 and k 4.5, cl 99 and co2 22. Alb 4.2 and tb 0.5 and alk 111 and ast 16 and alt 25.   Prior visit lamictal came down from 75/50 to 50/25 as of oct 9 and added gabapentin 400mg and seroquel 25mg po qhs an atorvastatin 40mg po qhs.   Off the prior nasal exposures also.   Aunt Ewelina passed in oct 2020 had oltx and she had failed from 2nd tx. Not from covid 19.   [facility]s aug 2020 [facility]: they saw mild fatty infiltration of the liver. Prior gb surgery changes seen. Visualized pancreas portions normal. No liver masses. Common duct 4mm. Portal vein patent. Kidneys unremarkable. Spleen 7.8cm unremarkable. Should get better as time passes from the issues we discussed.   Aug 4 2020 labs: glu 61 and little low bun 17 and cr 0.91 and na 135 and k 5.2 and cl 96 and co2 20 and ca 10.9 elevated and show local md. alb 4.7 and tb 0.6 and alk 104 and ast 24 and alt 28.   She was on ursodiol for has been on it for her liver started july 29 and she did stop it . She is staying off this as last time ok and may need it.   Nov 25 2020: wbc 11.3 and hg 14.1 plat 484 elevated and neutrophils 7.7 and glu 144 elevated and cr 1.08 little up.na 139 and k 4.3 and cl 99 and co2 24 and ca 10 and alb 4,7 and tb 0.3 and alk 130 and ast 15 and alt 18.   Plan:   1. Check the labs now and in 2-3 and 5-6 weeks. 2. U.s in oct, 3. May need to restart the ursodiol. 4. Pt will be seen in 6 weeks,   Stressed to pt the need for social distancing and strict handwashing and wearing a mask and to follow any other new or added CDC recommendations as this is an evolving target.   Duration of visit was 37 min and with 5m and 32 min by [employer] video devonte with greater than 50% of time spent reviewing chart and events with the patient.

## 2021-08-24 ENCOUNTER — OUT OF OFFICE VISIT (OUTPATIENT)
Dept: URBAN - METROPOLITAN AREA MEDICAL CENTER 27 | Facility: MEDICAL CENTER | Age: 46
End: 2021-08-24
Payer: COMMERCIAL

## 2021-08-24 DIAGNOSIS — R13.19 CERVICAL DYSPHAGIA: ICD-10-CM

## 2021-08-24 PROCEDURE — 91010 ESOPHAGUS MOTILITY STUDY: CPT | Performed by: INTERNAL MEDICINE

## 2021-08-27 ENCOUNTER — TELEPHONE ENCOUNTER (OUTPATIENT)
Dept: URBAN - METROPOLITAN AREA CLINIC 92 | Facility: CLINIC | Age: 46
End: 2021-08-27

## 2021-08-27 LAB
A/G RATIO: 1.5
ALBUMIN: 4.3
ALKALINE PHOSPHATASE: 135
ALT (SGPT): 28
AST (SGOT): 18
BASO (ABSOLUTE): 0.1
BASOS: 1
BILIRUBIN, TOTAL: 0.3
BUN/CREATININE RATIO: 15
BUN: 15
CALCIUM: 9.5
CARBON DIOXIDE, TOTAL: 23
CHLORIDE: 102
CREATININE: 0.99
EGFR IF AFRICN AM: 79
EGFR IF NONAFRICN AM: 69
EOS (ABSOLUTE): 0.5
EOS: 6
GLOBULIN, TOTAL: 2.8
GLUCOSE: 98
HBSAG SCREEN: NEGATIVE
HEMATOCRIT: 37.8
HEMATOLOGY COMMENTS:: (no result)
HEMOGLOBIN: 12.5
HEP B CORE AB, TOT: NEGATIVE
HEPATITIS B SURF AB QUANT: 169.7
IGG, IMMUNOGLOBULIN G (RDL): 1380
IGM, IMMUNOGLOBULIN M (RDL): 131
IMMATURE CELLS: (no result)
IMMATURE GRANS (ABS): 0
IMMATURE GRANULOCYTES: 1
LYMPHS (ABSOLUTE): 2.3
LYMPHS: 27
MCH: 31.3
MCHC: 33.1
MCV: 95
MONOCYTES(ABSOLUTE): 0.7
MONOCYTES: 9
NEUTROPHILS (ABSOLUTE): 4.9
NEUTROPHILS: 56
NRBC: (no result)
PLATELETS: 384
POTASSIUM: 4.3
PROTEIN, TOTAL: 7.1
RBC: 3.99
RDW: 12.8
SODIUM: 139
WBC: 8.5

## 2021-08-27 NOTE — HPI-TODAY'S VISIT:
Dear Chanda Segura, August 24 labs show IgM normal at 131.  IgG normal 1380.   Strong immunity to hep B at 160 9.7B surface antigen negative.  B core total negative.  Glucose normal at 98 BUN of 15 creatinine normal 0.99 sodium 139 potassium 4.3 calcium 9.5 albumin 4.3 bilirubin 0.3.  Alkaline phosphatase slightly loss at 135.  AST 18 ALT 28.  Previously alkaline phosphatase 130 AST 15 and ALT 18 back in November 2020 for comparison so not too far off from your usual.  While the cell count 8.5 hemoglobin 12.5 platelet count 384 down from 482 on the platelet count before in November for comparison.  Neutrophils normal at 4.9. Back on May 3 for closer comparison alkaline phosphatase was 177 AST 30 and ALT 70.  So no doubt that you had come down significantly after that doxycycline exposure. I would not make any changes right now on your medicines and follow the labs and keep very close tabs on any medicines that are adjusted either by being increased in dose or changed to something else so that if you flux or change your labs again we know what may have triggered this. Dr. Issa

## 2021-09-06 ENCOUNTER — LAB OUTSIDE AN ENCOUNTER (OUTPATIENT)
Dept: URBAN - METROPOLITAN AREA TELEHEALTH 2 | Facility: TELEHEALTH | Age: 46
End: 2021-09-06

## 2021-09-08 ENCOUNTER — TELEPHONE ENCOUNTER (OUTPATIENT)
Dept: URBAN - METROPOLITAN AREA CLINIC 23 | Facility: CLINIC | Age: 46
End: 2021-09-08

## 2021-09-08 ENCOUNTER — TELEPHONE ENCOUNTER (OUTPATIENT)
Dept: URBAN - METROPOLITAN AREA CLINIC 92 | Facility: CLINIC | Age: 46
End: 2021-09-08

## 2021-09-08 LAB
ALBUMIN: 4.8
ALKALINE PHOSPHATASE: 215
ALT (SGPT): 86
AST (SGOT): 61
BILIRUBIN, DIRECT: 0.2
BILIRUBIN, TOTAL: 0.7
PROTEIN, TOTAL: 7.8

## 2021-09-08 NOTE — HPI-TODAY'S VISIT:
Dear Chanda Segura, Sept 7 labs: alb 4.8 and tb 0.7 and db 0.2 and alk 215 and Ast 61 and alt 86. Prior aug 27 albs ast 18 and alt 28 and alk 135.   Says did migraine med by 1030 and did labs 330 pm. Getting Enbrel weekly. Could still be Lamictal and that was increased. Redo the labs and if they are not better then know may be the Lamictal dose change. Spoke with pt. Dr Issa

## 2021-09-09 ENCOUNTER — TELEPHONE ENCOUNTER (OUTPATIENT)
Dept: URBAN - METROPOLITAN AREA CLINIC 78 | Facility: CLINIC | Age: 46
End: 2021-09-09

## 2021-09-09 ENCOUNTER — LAB OUTSIDE AN ENCOUNTER (OUTPATIENT)
Dept: URBAN - METROPOLITAN AREA CLINIC 92 | Facility: CLINIC | Age: 46
End: 2021-09-09

## 2021-09-10 ENCOUNTER — TELEPHONE ENCOUNTER (OUTPATIENT)
Dept: URBAN - METROPOLITAN AREA CLINIC 92 | Facility: CLINIC | Age: 46
End: 2021-09-10

## 2021-09-10 LAB
ALBUMIN: 4.9
ALKALINE PHOSPHATASE: 207
ALT (SGPT): 66
AST (SGOT): 28
BILIRUBIN, DIRECT: 0.23
BILIRUBIN, TOTAL: 0.8
PROTEIN, TOTAL: 8

## 2021-09-10 NOTE — HPI-TODAY'S VISIT:
Dear Chanda Segura, September 9 redo labs show albumin stable 4.9 bilirubin 0.8 direct 0.23 alkaline phosphatase slightly lower at 207 from 215.  AST down to 28 from 61.  ALT down to 66 from 86.   So it does appear the labs are dropping.  Need to follow this trend and see how it does.   Need to keep track of any medicine changes going forward as we discussed. Dr. Issa

## 2021-09-20 ENCOUNTER — LAB OUTSIDE AN ENCOUNTER (OUTPATIENT)
Dept: URBAN - METROPOLITAN AREA TELEHEALTH 2 | Facility: TELEHEALTH | Age: 46
End: 2021-09-20

## 2021-09-22 ENCOUNTER — TELEPHONE ENCOUNTER (OUTPATIENT)
Dept: URBAN - METROPOLITAN AREA CLINIC 92 | Facility: CLINIC | Age: 46
End: 2021-09-22

## 2021-09-22 LAB
ALBUMIN: 4.9
ALKALINE PHOSPHATASE: 198
ALT (SGPT): 52
AST (SGOT): 33
BILIRUBIN, DIRECT: 0.13
BILIRUBIN, TOTAL: 0.4
PROTEIN, TOTAL: 7.9

## 2021-09-22 NOTE — HPI-TODAY'S VISIT:
Dear Chanda Segura, September 21 labs show albumin 4.9 which remains slightly up bilirubin 0.4 which is down from 0.8 direct bilirubin 0.13 which is down from 0.23.  Alkaline phosphatase down to 198 from 207 AST slightly up at 33 from 28 ALT 52 down from 66. Appears labs continue to drop and we just need to follow this and see how you do.  If they continue to drop that means you are settled on the current medicines.  If they do not go back to your normal that means we have to look at what else was last added new to meds. Dr Issa

## 2021-09-23 ENCOUNTER — TELEPHONE ENCOUNTER (OUTPATIENT)
Dept: URBAN - METROPOLITAN AREA CLINIC 92 | Facility: CLINIC | Age: 46
End: 2021-09-23

## 2021-09-23 NOTE — HPI-TODAY'S VISIT:
More Segura, September 21 ultrasound showed the liver to be 15.6 cm with mild hepatic steatosis but no focal mass.  Liver vessels are noted to be patent.  Prior cholecystectomy changes seen.  No bile duct dilation seen with common bile duct 5 mm.  Visualized pancreas portions were unremarkable proportions of the head and tail the pancreas were not seen due to gas.  Right kidney 10.6 cm and left kidney 10.8 cm with no hydronephrosis.  Spleen normal at 8.5 cm.  Mild liver fat overall was seen patent vessels were seen.  No ascites was seen. Dr. Issa

## 2021-09-27 ENCOUNTER — LAB OUTSIDE AN ENCOUNTER (OUTPATIENT)
Dept: URBAN - METROPOLITAN AREA TELEHEALTH 2 | Facility: TELEHEALTH | Age: 46
End: 2021-09-27

## 2021-10-04 ENCOUNTER — LAB OUTSIDE AN ENCOUNTER (OUTPATIENT)
Dept: URBAN - METROPOLITAN AREA TELEHEALTH 2 | Facility: TELEHEALTH | Age: 46
End: 2021-10-04

## 2021-10-12 ENCOUNTER — LAB OUTSIDE AN ENCOUNTER (OUTPATIENT)
Dept: URBAN - METROPOLITAN AREA CLINIC 86 | Facility: CLINIC | Age: 46
End: 2021-10-12

## 2021-10-13 ENCOUNTER — TELEPHONE ENCOUNTER (OUTPATIENT)
Dept: URBAN - METROPOLITAN AREA CLINIC 92 | Facility: CLINIC | Age: 46
End: 2021-10-13

## 2021-10-13 LAB
ALBUMIN: 4.5
ALKALINE PHOSPHATASE: 301
ALT (SGPT): 171
AST (SGOT): 77
BILIRUBIN, DIRECT: 0.19
BILIRUBIN, TOTAL: 0.6
PROTEIN, TOTAL: 7.7

## 2021-10-13 NOTE — HPI-TODAY'S VISIT:
Dear Chanda Segura, Oct 12: alb 4.5 and tb 0.6 and db 0.19 and alk 301 and ast 77 and alt 171 and prior ast 33 and alt 52. Called pt. She did surgery 9-29 for ulner nerve. Says taking dilaudid and ibuprofen. She says taking a dilaudid occ and one ibuprofen. 10-1 labs also off. Still on steroids. 10-1 alk 199 and ast 56 and alt 149.  Lamictal down to 100. Enbrel off for 2 weeks. Neurologist gave emgality and per livertox unlikely cause of this. Seroquel increased to 100 mg from 50mg and so that maybe it. May want to look at changing Seroquel to other med in that class.  She will call psych re this. Dr Issa

## 2021-10-14 ENCOUNTER — OFFICE VISIT (OUTPATIENT)
Dept: URBAN - METROPOLITAN AREA TELEHEALTH 2 | Facility: TELEHEALTH | Age: 46
End: 2021-10-14
Payer: COMMERCIAL

## 2021-10-14 DIAGNOSIS — Z71.6 TOBACCO ABUSE COUNSELING: ICD-10-CM

## 2021-10-14 DIAGNOSIS — G44.009: ICD-10-CM

## 2021-10-14 DIAGNOSIS — F31.30 BIPOLAR AFFECTIVE DISORDER, CURRENT EPISODE DEPRESSED, CURRENT EPISODE SEVERITY UNSPECIFIED: ICD-10-CM

## 2021-10-14 DIAGNOSIS — F14.11 COCAINE ABUSE IN REMISSION: ICD-10-CM

## 2021-10-14 DIAGNOSIS — F10.11 ALCOHOL USE DISORDER, MILD, IN EARLY REMISSION, ABUSE: ICD-10-CM

## 2021-10-14 DIAGNOSIS — K63.5 POLYP OF COLON, UNSPECIFIED PART OF COLON, UNSPECIFIED TYPE: ICD-10-CM

## 2021-10-14 DIAGNOSIS — E66.3 OVERWEIGHT: ICD-10-CM

## 2021-10-14 DIAGNOSIS — F33.1 MODERATE EPISODE OF RECURRENT MAJOR DEPRESSIVE DISORDER: ICD-10-CM

## 2021-10-14 DIAGNOSIS — K75.4 AUTOIMMUNE HEPATITIS: ICD-10-CM

## 2021-10-14 DIAGNOSIS — D80.2 IGA DEFICIENCY: ICD-10-CM

## 2021-10-14 DIAGNOSIS — K71.9 DRUG-INDUCED LIVER DISEASE: ICD-10-CM

## 2021-10-14 PROBLEM — 78974003: Status: ACTIVE | Noted: 2020-12-07

## 2021-10-14 PROCEDURE — 99214 OFFICE O/P EST MOD 30 MIN: CPT

## 2021-10-14 RX ORDER — CHOLECALCIFEROL TAB 50 MCG (2000 UNIT) 50 MCG
ONCE PER WEEK TAB ORAL
Status: DISCONTINUED | COMMUNITY

## 2021-10-14 RX ORDER — BUSPIRONE HYDROCHLORIDE 5 MG/1
1 TABLET TABLET ORAL
Status: ACTIVE | COMMUNITY

## 2021-10-14 RX ORDER — QUETIAPINE 100 MG/1
1 TABLET AT BEDTIME TABLET, FILM COATED ORAL ONCE A DAY
Status: ACTIVE | COMMUNITY

## 2021-10-14 RX ORDER — PREDNISONE 10 MG/1
1 TABLET TABLET ORAL ONCE A DAY
Status: ACTIVE | COMMUNITY

## 2021-10-14 RX ORDER — IBUPROFEN 200 MG/1
1 TABLET WITH FOOD OR MILK AS NEEDED TABLET, FILM COATED ORAL
Status: ACTIVE | COMMUNITY

## 2021-10-14 RX ORDER — PREGABALIN 75 MG/1
1 CAPSULE CAPSULE ORAL TWICE A DAY
Status: ACTIVE | COMMUNITY

## 2021-10-14 RX ORDER — BUDESONIDE AND FORMOTEROL FUMARATE DIHYDRATE 160; 4.5 UG/1; UG/1
2 PUFFS AEROSOL RESPIRATORY (INHALATION) TWICE A DAY
Status: ACTIVE | COMMUNITY

## 2021-10-14 RX ORDER — FUROSEMIDE 40 MG/1
1 TABLET TABLET ORAL ONCE A DAY
Status: ACTIVE | COMMUNITY

## 2021-10-14 RX ORDER — ATORVASTATIN CALCIUM 20 MG/1
1 TABLET TABLET, FILM COATED ORAL ONCE A DAY
Status: ACTIVE | COMMUNITY

## 2021-10-14 RX ORDER — SPIRONOLACTONE 100 MG/1
1 TABLET TABLET, FILM COATED ORAL ONCE A DAY
Status: ACTIVE | COMMUNITY

## 2021-10-14 RX ORDER — ALBUTEROL SULFATE 90 UG/1
1 PUFF AS NEEDED AEROSOL, METERED RESPIRATORY (INHALATION)
Status: ACTIVE | COMMUNITY

## 2021-10-14 RX ORDER — LAMOTRIGINE 100 MG/1
1 TABLET TABLET, FILM COATED, EXTENDED RELEASE ORAL ONCE A DAY
Status: ACTIVE | COMMUNITY

## 2021-10-14 NOTE — EXAM-PHYSICAL EXAM
Telemed self reported:  Gen: no distress. Eyes: no jaundice. Mouth: no thrush. CV: no chest pain. Resp: no wheezes. Abd: no pain. Ext: no edema.	 Neuro: no weakness. Still healing from left arm surgery.

## 2021-10-14 NOTE — HPI-TODAY'S VISIT:
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  Our latest contact this week was re the lfts going up and she related that seroquel. She was to reach out to psych as not working and concerning. They are to see her for an appt to discuss this. regular therapist said to ask if could get adhd screening. Bipolar meds not needing.  Oct 12: alb 4.5 and tb 0.6 and db 0.19 and alk 301 and ast 77 and alt 171 and prior ast 33 and alt 52.  went to 25 and 50 and now 100mg.  Seroquel livertix: Liver test abnormalities may occur in up to 30% of patients on long term therapy with quetiapine, but elevations are uncommonly above 3 times the upper limit of normal. The aminotransferase abnormalities are usually mild, asymptomatic and transient, reversing even with continuation of medication. Instances of clinically apparent acute liver injury have been reported due to quetiapine, but they are rare. The onset of jaundice is within 1 to 4 weeks of starting the drug, and the pattern of serum enzyme elevations is typically hepatocellular. Signs of immunoallergic manifestations (fever, rash and eosinophilia) are rare, as are autoantibodies. Most cases are mild to moderate in severity and self-limited in course. Instances of acute liver failure have been reported but not vanishing bile duct syndrome or chronic liver injury.  Likelihood score: B (likely but rare cause of clinically apparent liver injury).  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 Says taking dilaudid and ibuprofen for that.  She says taking a dilaudid occ and one ibuprofen. 10-1 labs also off. Still on steroids 10mg po qd for the rheum arthritis so enbrel stopped working and went to this.   She did myasthenia screen and neg and started on this and trying to do humira sunday. Mother is on that.  10-1 alk 199 and ast 56 and alt 149.  we were suspicious then and asked the pt.  September 21 ultrasound showed the liver to be 15.6 cm with mild hepatic steatosis but no focal mass.  Liver vessels are noted to be patent.  Prior cholecystectomy changes seen.  No bile duct dilation seen with common bile duct 5 mm.  Visualized pancreas portions were unremarkable proportions of the head and tail the pancreas were not seen due to gas.  Right kidney 10.6 cm and left kidney 10.8 cm with no hydronephrosis.  Spleen normal at 8.5 cm.  Mild liver fat overall was seen patent vessels were seen.  No ascites was seen.  September 21 labs show albumin 4.9 which remains slightly up bilirubin 0.4 which is down from 0.8 direct bilirubin 0.13 which is down from 0.23.  Alkaline phosphatase down to 198 from 207 AST slightly up at 33 from 28 ALT 52 down from 66.  September 9 redo labs show albumin stable 4.9 bilirubin 0.8 direct 0.23 alkaline phosphatase slightly lower at 207 from 215.  AST down to 28 from 61.  ALT down to 66 from 86.    So it does appear the labs are dropping with lamictal drop 150 to 100mg and then went up on seroquel.  Sept 7 labs: alb 4.8 and tb 0.7 and db 0.2 and alk 215 and Ast 61 and alt 86.  Prior aug 27 albs ast 18 and alt 28 and alk 135.   that was pre lamictal changed.   RECAP : She had priordrinking green tea for a few weeks. She has an immune issue and so the labs spiked up. then stopped and labs settled last year off that.  Pt was to do a bx and trouble breathing and Dr Self told to do barium swallow and manometry and did the barium and to do the manometry and says told was ok.  Ent saw some plaques and she had bx done.  Pelvic area pain noted and se started with pain and went to er and went [facility] to [facility] and she said cyst in fallopian tube and ruptured and did a full hysterectomy and that was July 28 2021.  Ex  burned her citizenship certificate. She said she applied to get it. [phone] for that.  Enbrel started earlier for seronegative RA.  She says needed onc re wbc up and asked him to do the lfts and she says part of labs back and she has from aug 8 2021. Ast and alt done and alk 209 and prior 138 and ast 149 and alt prior 34 and alt 231 and 76 july 19. b12 1804 and IGA 68 little low.   May 3:wbc 9.6 hg 13.7 and plat 355 and mcv 93. Neutrophils 6.5. glu 92 and cr 0.95 and na 137 and k 4.3 and ca 9.8 and alb 4.5 and tb 0.3 and alk 177 elevated and ast 30 and alt 70 and tesosterone level 6 normal per chart for your age. Prior alk 277 and ast 126 and alt 315.  So came down post that doxycycline exposure.  [facility] 6, 2021 [facility]s at [facility]: pancreas appears normal where seen and liver demonstrates normal size and contour and echogenicity. Liver vessels patent. Gallbladder absent. Common bile duct 4mm. No hydronephrosis seen to kidneys. Spleen 8.4cm. No liver mass seen.  Jan 11: glu 171 elevated and show local md. cr 1.06 and na 137 and k 4.0 and cl 98 and co2 21 and ca 9.7 normal and alb 4.6 and tb 0.7 and alk 88 and ast 13 and alt 16. So liver labs doing very well. A1c 5.9% noted.   Jan 20 2021: ct no cardiomegaly. Clear lung bases. Unremarkable liver. Prior gb surgery changes. Unremarkable spleen. Pancreas and and adrenal glands normal. cyst right kidney. left renal cyst. Mild rectosigmoid wall thickening. Liver vessels patent. No bulk adenopathy. Uterine hypodensity 4.4x2.9x4.1cm inlower uterine segment and cervix.  Nov 9 2020 labs: wbc 13. 4hg 14 and plat 453 elevated slightly (150-450 normal). na 135 k 4.6 and cl 97 and co2 26 and calcium elevated 11.0. Show to local md: why is calcium up?alb 4.5 and tb 0.6 and alk 165 elevated and ast 22 and alt 36 so back down. chol 336 elevated and trg 307 and ldl 219. Lipids off and not sure if due to recent liver flare or other and may want to redo that as known if liver flared that this can be off. Vit d low 28.8. a1c 5.9%.  She says has had low vit d and she was to start this and she was started on 5000 a day for 4 m and she had been taking some vit d.  She says she had stopped it for surgery July and restarted it again.  She says was on doxycycline and said had a prior hx of tcn reaction and filled and she said she may have been more ill from it. No reexposure.  Nov 3 2020 : ast 50 and alt 121 (both down then from 121 and 222). main she is off green tea and the lamictal also off prior. Suspect green tea was immune stimulant reving up your immune system.  She has a very active immune system. Rheum says she has fibromyalgia and doing water therapy.  11-2-20: ast down to 50 and alt 118 and prior 121 and alt 222 . alk 213 still up from 210 and tb down to 0.4 from 0.5.  Oct 26 labs tb 0.5 and alk 210 and ast 121 and alt 222. alk 248 and so now 210 so that is better, and ast 128 and that is now 121 and alt was 248 and now 222 so better and we need to follow.  Oct 19 labs spiked again: alk 198 and ast 128 and alt 248. Tb 0.5. na 136 and k k 4.5. bun 15 and cr 0.94 and glu 112.  Oct 6 ast 16 and alt 25 and alk 111. Prior she was also on abx for 6 weeks and so finished oct 8 or so and so that not the cause.  Oct 6: wbc 11.2 little up, hg 14.4 and hct 42.2, plat 141. Mcv 90, glu 103 little up and maybe not fasting. Bun 20 and cr 0.98 and na 135 and k 4.5, cl 99 and co2 22. Alb 4.2 and tb 0.5 and alk 111 and ast 16 and alt 25. Prior visit lamictal came down from 75/50 to 50/25 as of oct 9 and added gabapentin 400mg and seroquel 25mg po qhs an atorvastatin 40mg po qhs.  Off the prior nasal exposures also.   Aunt Ewelina passed in oct 2020 had oltx and she had failed from 2nd tx. Not from covid 19.  [facility] aug 2020 [facility]: they saw mild fatty infiltration of the liver. Prior gb surgery changes seen. Visualized pancreas portions normal. No liver masses. Common duct 4mm. Portal vein patent. Kidneys unremarkable. Spleen 7.8cm unremarkable. Should get better as time passes from the issues we discussed.  Aug 4 2020 labs: glu 61 and little low bun 17 and cr 0.91 and na 135 and k 5.2 and cl 96 and co2 20 and ca 10.9 elevated and show local md. alb 4.7 and tb 0.6 and alk 104 and ast 24 and alt 28.  She was on ursodiol for has been on it for her liver started july 29 and she did stop it . She is staying off this as last time ok and may need it.  Nov 25 2020: wbc 11.3 and hg 14.1 plat 484 elevated and neutrophils 7.7 and glu 144 elevated and cr 1.08 little up.na 139 and k 4.3 and cl 99 and co2 24 and ca 10 and alb 4,7 and tb 0.3 and alk 130 and ast 15 and alt 18.  Plan: 1. Check in with psych re the medicine. 2. She will do labs in 3 and 6 and 9 weeks. 3. See then and by then it should be better. 4. u.s in April 2022. 5. May need to start ursodiol.  Stressed to pt the need for social distancing and strict handwashing and wearing a mask and to follow any other new or added CDC recommendations as this is an evolving target.  Duration of visit was  36 min and with 5m of prep and then 31 min by dox video devonte with greater than 50% of time spent reviewing chart and events with the patient.

## 2021-10-18 ENCOUNTER — LAB OUTSIDE AN ENCOUNTER (OUTPATIENT)
Dept: URBAN - METROPOLITAN AREA TELEHEALTH 2 | Facility: TELEHEALTH | Age: 46
End: 2021-10-18

## 2021-10-21 ENCOUNTER — TELEPHONE ENCOUNTER (OUTPATIENT)
Dept: URBAN - METROPOLITAN AREA CLINIC 92 | Facility: CLINIC | Age: 46
End: 2021-10-21

## 2021-10-21 LAB
A/G RATIO: 1.6
ALBUMIN: 4.3
ALKALINE PHOSPHATASE: 194
ALT (SGPT): 53
AST (SGOT): 21
BILIRUBIN, TOTAL: 0.4
BUN/CREATININE RATIO: 11
BUN: 11
CALCIUM: 10
CARBON DIOXIDE, TOTAL: 24
CHLORIDE: 101
CREATININE: 0.97
EGFR IF AFRICN AM: 80
EGFR IF NONAFRICN AM: 70
GLOBULIN, TOTAL: 2.7
GLUCOSE: 98
HBSAG SCREEN: NEGATIVE
HEP B CORE AB, TOT: NEGATIVE
POTASSIUM: 4.1
PROTEIN, TOTAL: 7
SODIUM: 141

## 2021-10-21 NOTE — HPI-TODAY'S VISIT:
Dear Chanda Segura, October 20 labs show glucose 98 BUN of 11 creatinine 0.97 sodium 141 potassium 4.1 calcium 10 albumin 4.3 bilirubin 0.4 alk phos up to 194 from 135.  AST 21 up from 18 and ALT up to 53 from 28 hep B core total negative hep B surface antigen negative so that is very important to confirm. Called pt and she last week changed it and so we need to see labs next week and see if they are settling. Dr Issa

## 2021-10-26 ENCOUNTER — LAB OUTSIDE AN ENCOUNTER (OUTPATIENT)
Dept: URBAN - METROPOLITAN AREA CLINIC 92 | Facility: CLINIC | Age: 46
End: 2021-10-26

## 2021-10-28 ENCOUNTER — TELEPHONE ENCOUNTER (OUTPATIENT)
Dept: URBAN - METROPOLITAN AREA CLINIC 92 | Facility: CLINIC | Age: 46
End: 2021-10-28

## 2021-10-28 NOTE — HPI-TODAY'S VISIT:
Dear Chanda Segura,  10-21 labs: wbc 17.5 elevated and hg 14.4 and hct 43,7 and platelets up at 507. Mcv 93. Neutrophils 13.7 up and lymphs on 2.1 and monocytes 1.0 elevated, Immature granulocytes also up 0.5. Glucose 142 and hg a1c 6.3 elevated. Cr 0.93 and bun 18 and na 136 and k 4.4 and cl 95 and co2 23 and ca 10.0 and alb 4.5 and tb 0.5 and alk 164 elevated and ast 123 and alt 137 elevated, UA 3.9 and cpk little low 27. Crp less than 1. Oct 20 alk 194 and ast 21 and alt 53 so really up now.  Saw you are taking z lawrence, abilify, prednisone and doxepin. Of these zpak and abilify can add to this and prednisone can add fat but not expected to raise this high that fast either. Need to redo the labs as you finish the abx an dsteroids and see iff the lfts drop.  Dr Issa

## 2021-11-07 ENCOUNTER — TELEPHONE ENCOUNTER (OUTPATIENT)
Dept: URBAN - METROPOLITAN AREA CLINIC 92 | Facility: CLINIC | Age: 46
End: 2021-11-07

## 2021-11-07 NOTE — HPI-TODAY'S VISIT:
Dear Chanda Segura, November 3 labs show white cell count elevated at 20.2 up from 17.5.  Platelets still elevated at 43 from 507.  Hemoglobin 14.6 which is normal hematocrit 43.8 which is normal.  MCV 94.  Neutrophils down to 9.9 and lymphocytes 7.3. Albumin 4.8 bilirubin 0.3 which is down from 0.5.  Alkaline phosphatase still up at 199 from 164.  AST though much lower at 58 from 123 but ALT went up to 189 from 137. Suspect these labs are in flux due to some recent issue. Called pt Nov 7 to discuss labs and went to [facility] and left a message for her. Sent via portal also. Please redo labs in 2 weeks. Dr Issa

## 2021-11-08 ENCOUNTER — LAB OUTSIDE AN ENCOUNTER (OUTPATIENT)
Dept: URBAN - METROPOLITAN AREA TELEHEALTH 2 | Facility: TELEHEALTH | Age: 46
End: 2021-11-08

## 2021-11-19 ENCOUNTER — LAB OUTSIDE AN ENCOUNTER (OUTPATIENT)
Dept: URBAN - METROPOLITAN AREA CLINIC 86 | Facility: CLINIC | Age: 46
End: 2021-11-19

## 2021-11-20 ENCOUNTER — TELEPHONE ENCOUNTER (OUTPATIENT)
Dept: URBAN - METROPOLITAN AREA CLINIC 92 | Facility: CLINIC | Age: 46
End: 2021-11-20

## 2021-11-20 LAB
A/G RATIO: 1.7
ALBUMIN: 4.7
ALKALINE PHOSPHATASE: 118
ALT (SGPT): 46
AST (SGOT): 29
BILIRUBIN, TOTAL: 0.5
BUN/CREATININE RATIO: 18
BUN: 18
CALCIUM: 10.3
CARBON DIOXIDE, TOTAL: 24
CHLORIDE: 99
CREATININE: 0.99
EGFR IF AFRICN AM: 78
EGFR IF NONAFRICN AM: 68
GLOBULIN, TOTAL: 2.8
GLUCOSE: 123
POTASSIUM: 5.1
PROTEIN, TOTAL: 7.5
SODIUM: 137

## 2021-11-20 NOTE — HPI-TODAY'S VISIT:
Dear Chanda Segura, November 98 labs show 123 which is elevated BUN of 18 creatinine 0.99 sodium 137 potassium 5.1 chloride 99 CO2 of 24 albumin 4.7 bilirubin 0.5 alkaline phosphatase down to 118 from 194.  AST 29 and previously 21 ALT down to 46 from 53.  So clearly centimeters drop. She says she had increased the Seroquel and then being weaned on it now. She is now on 50 mg of Seroquel and she is to stop it tonight and she will redo labs in 1-2 weeks to be sure.  Ca 10.3 little up and she is not on supplements for this and follow course. She will let primary md know.  Spoke with pt. Dr Issa

## 2021-11-29 ENCOUNTER — LAB OUTSIDE AN ENCOUNTER (OUTPATIENT)
Dept: URBAN - METROPOLITAN AREA TELEHEALTH 2 | Facility: TELEHEALTH | Age: 46
End: 2021-11-29

## 2021-12-02 ENCOUNTER — TELEPHONE ENCOUNTER (OUTPATIENT)
Dept: URBAN - METROPOLITAN AREA CLINIC 92 | Facility: CLINIC | Age: 46
End: 2021-12-02

## 2021-12-02 LAB
ALBUMIN: 4.2
ALKALINE PHOSPHATASE: 111
ALT (SGPT): 43
AST (SGOT): 19
BILIRUBIN, DIRECT: 0.1
BILIRUBIN, TOTAL: 0.2
PROTEIN, TOTAL: 6.9

## 2021-12-02 NOTE — HPI-TODAY'S VISIT:
Dear Chanda Segura, Dec 1 labs much better: Tp 6.9 and alb 4.2 and tb 0.2 and db 0.10 and alk 111 and ast 19 and alt 43 and down from prior alk 301 and ast 77 and alt 171. So doing much better. Keep track of any new meds added or removed. Dr Issa

## 2021-12-15 ENCOUNTER — TELEPHONE ENCOUNTER (OUTPATIENT)
Dept: URBAN - METROPOLITAN AREA CLINIC 92 | Facility: CLINIC | Age: 46
End: 2021-12-15

## 2021-12-15 NOTE — HPI-TODAY'S VISIT:
Dear Chanda Segura, December 13 labs show whiteblood  cell count elevated 11.9 but down from 12.6.  Hemoglobin 15 and plateletsd up at 503 and previously 394.  That can sometimes go up with inflammation or medicines.  Please be sure to share this info with primary providers. MCV normal at 90.  Neutrophils elevated 7.4 but down from 10.1 back on December 2.  Lymphocytes normal at 3.1 and monocytes slightly up at 1.0. Glucose elevated at 149 and was previously 114.  Hemoglobin A1c up to 8.1 and was previously 6.3.  You should look at this sugar issue again with your primary provider as that is unusual for you. BUN of 12 creatinine 1.19 which is up from 1.014 sodium 136 potassium 4.2 chloride 98 CO2 20. Calcium at 10.4 and was previously normal at 9.6 and I wonder if it is medicine related? Albumin 4.6 bilirubin 0.8 alkaline phosphatase slightly higher at 130 previously 126. AST down to 32 and ALT down to 49 previously AST 65 and ALT 66. C-reactive protein noted to be normal at 2. Please share labs with primary provider. Dr. Issa

## 2021-12-20 ENCOUNTER — LAB OUTSIDE AN ENCOUNTER (OUTPATIENT)
Dept: URBAN - METROPOLITAN AREA TELEHEALTH 2 | Facility: TELEHEALTH | Age: 46
End: 2021-12-20

## 2021-12-30 ENCOUNTER — TELEPHONE ENCOUNTER (OUTPATIENT)
Dept: URBAN - METROPOLITAN AREA CLINIC 92 | Facility: CLINIC | Age: 46
End: 2021-12-30

## 2021-12-30 LAB
ALBUMIN: 4.5
ALKALINE PHOSPHATASE: 111
ALT (SGPT): 48
AST (SGOT): 31
BILIRUBIN, DIRECT: 0.11
BILIRUBIN, TOTAL: 0.4
PROTEIN, TOTAL: 7.3

## 2021-12-30 NOTE — HPI-TODAY'S VISIT:
Dear Chanda Segura, Dec 29 labs: ast 48 and alt 31 and alk 111 and tb 0.4. She was on new abx recently and so that maybe an issue for her. She will redo labs in 2-3 weeks and she has one lab left to do for us. Dr Issa

## 2022-02-01 ENCOUNTER — TELEPHONE ENCOUNTER (OUTPATIENT)
Dept: URBAN - METROPOLITAN AREA CLINIC 92 | Facility: CLINIC | Age: 47
End: 2022-02-01

## 2022-02-01 NOTE — HPI-TODAY'S VISIT:
Dear Chanda Segura, January 4 local labs sent in. Glucose 159 elevated please share with primary provider.  He had a 16 creatinine 0.96 sodium 138 potassium 4.2 chloride 99 calcium 9.9 total bili 0.4 alkaline phosphatase 202 and . Prior December 29 labs showed alk phos 114 AST 28 and ALT 45 so clearly those numbers bumped. White blood cell count 8.4 hemoglobin 13.2 platelet count 350. Cholesterol 226 elevated.  Triglyceride 284 elevated.   elevated.  Hemoglobin A1c slightly better at 7.9 from 8.1%.  TSH 1.78.  Magnesium 2.2. Called pt: need new labs to see what labs doing. No answer at 814 pm so left message on her vmail. Asked her to call office tomorrow. Dr Issa

## 2022-02-02 ENCOUNTER — LAB OUTSIDE AN ENCOUNTER (OUTPATIENT)
Dept: URBAN - METROPOLITAN AREA TELEHEALTH 2 | Facility: TELEHEALTH | Age: 47
End: 2022-02-02

## 2022-02-02 ENCOUNTER — OFFICE VISIT (OUTPATIENT)
Dept: URBAN - METROPOLITAN AREA TELEHEALTH 2 | Facility: TELEHEALTH | Age: 47
End: 2022-02-02
Payer: COMMERCIAL

## 2022-02-02 DIAGNOSIS — K71.9 DRUG-INDUCED LIVER DISEASE: ICD-10-CM

## 2022-02-02 DIAGNOSIS — K75.4 AUTOIMMUNE HEPATITIS: ICD-10-CM

## 2022-02-02 DIAGNOSIS — D80.2 IGA DEFICIENCY: ICD-10-CM

## 2022-02-02 DIAGNOSIS — M13.80 SERONEGATIVE ARTHRITIS: ICD-10-CM

## 2022-02-02 DIAGNOSIS — F10.11 ALCOHOL USE DISORDER, MILD, IN EARLY REMISSION, ABUSE: ICD-10-CM

## 2022-02-02 DIAGNOSIS — R74.8 ABNORMAL LIVER ENZYMES: ICD-10-CM

## 2022-02-02 DIAGNOSIS — G44.009: ICD-10-CM

## 2022-02-02 DIAGNOSIS — F33.1 MODERATE EPISODE OF RECURRENT MAJOR DEPRESSIVE DISORDER: ICD-10-CM

## 2022-02-02 DIAGNOSIS — F14.11 COCAINE ABUSE IN REMISSION: ICD-10-CM

## 2022-02-02 DIAGNOSIS — K63.5 POLYP OF COLON, UNSPECIFIED PART OF COLON, UNSPECIFIED TYPE: ICD-10-CM

## 2022-02-02 DIAGNOSIS — F31.30 BIPOLAR AFFECTIVE DISORDER, CURRENT EPISODE DEPRESSED, CURRENT EPISODE SEVERITY UNSPECIFIED: ICD-10-CM

## 2022-02-02 DIAGNOSIS — E66.3 OVERWEIGHT: ICD-10-CM

## 2022-02-02 PROCEDURE — 99215 OFFICE O/P EST HI 40 MIN: CPT

## 2022-02-02 RX ORDER — IBUPROFEN 200 MG/1
1 TABLET WITH FOOD OR MILK AS NEEDED TABLET, FILM COATED ORAL
Status: DISCONTINUED | COMMUNITY

## 2022-02-02 RX ORDER — DOXEPIN HYDROCHLORIDE 10 MG/1
3 CAPSULE AT BEDTIME CAPSULE ORAL ONCE A DAY
Status: ACTIVE | COMMUNITY

## 2022-02-02 RX ORDER — ALBUTEROL SULFATE 90 UG/1
1 PUFF AS NEEDED AEROSOL, METERED RESPIRATORY (INHALATION)
Status: DISCONTINUED | COMMUNITY

## 2022-02-02 RX ORDER — NAPROXEN 500 MG/1
1 TABLET WITH FOOD OR MILK AS NEEDED TABLET ORAL
Status: ACTIVE | COMMUNITY

## 2022-02-02 RX ORDER — BUSPIRONE HYDROCHLORIDE 5 MG/1
1 TABLET TABLET ORAL
Status: DISCONTINUED | COMMUNITY

## 2022-02-02 RX ORDER — FUROSEMIDE 40 MG/1
1 TABLET TABLET ORAL ONCE A DAY
Status: ACTIVE | COMMUNITY

## 2022-02-02 RX ORDER — TRAMADOL HYDROCHLORIDE 100 MG/1
1 TABLET AT BEDTIME TABLET, COATED ORAL ONCE A DAY
Status: ACTIVE | COMMUNITY

## 2022-02-02 RX ORDER — PROMETHAZINE HYDROCHLORIDE 25 MG/1
1 TABLET AS NEEDED TABLET ORAL
Status: ACTIVE | COMMUNITY

## 2022-02-02 RX ORDER — SPIRONOLACTONE 100 MG/1
1 TABLET TABLET, FILM COATED ORAL ONCE A DAY
Status: ACTIVE | COMMUNITY

## 2022-02-02 RX ORDER — PROPRANOLOL HYDROCHLORIDE 60 MG/1
1 CAPSULE CAPSULE, EXTENDED RELEASE ORAL ONCE A DAY
Status: ACTIVE | COMMUNITY

## 2022-02-02 RX ORDER — METOPROLOL TARTRATE 75 MG/1
1 TABLET WITH FOOD TABLET, FILM COATED ORAL QAM
Status: ACTIVE | COMMUNITY

## 2022-02-02 RX ORDER — ARIPIPRAZOLE 15 MG/1
1 TABLET TABLET ORAL ONCE A DAY
Status: ACTIVE | COMMUNITY

## 2022-02-02 RX ORDER — QUETIAPINE 100 MG/1
1 TABLET AT BEDTIME TABLET, FILM COATED ORAL ONCE A DAY
Status: DISCONTINUED | COMMUNITY

## 2022-02-02 RX ORDER — INSULIN GLARGINE 100 [IU]/ML
10 UNITS A DAY INJECTION, SOLUTION SUBCUTANEOUS
Status: ACTIVE | COMMUNITY

## 2022-02-02 RX ORDER — PREDNISONE 10 MG/1
1 TABLET TABLET ORAL ONCE A DAY
Status: DISCONTINUED | COMMUNITY

## 2022-02-02 RX ORDER — RABEPRAZOLE SODIUM 20 MG/1
1 TABLET TABLET, DELAYED RELEASE ORAL ONCE A DAY
Status: ACTIVE | COMMUNITY

## 2022-02-02 RX ORDER — PRUCALOPRIDE 2 MG/1
1 TABLET TABLET, FILM COATED ORAL ONCE A DAY
Status: ACTIVE | COMMUNITY

## 2022-02-02 RX ORDER — ATORVASTATIN CALCIUM 20 MG/1
1 TABLET TABLET, FILM COATED ORAL ONCE A DAY
Status: ACTIVE | COMMUNITY

## 2022-02-02 RX ORDER — PREGABALIN 75 MG/1
1 CAPSULE CAPSULE ORAL TWICE A DAY
Status: DISCONTINUED | COMMUNITY

## 2022-02-02 RX ORDER — TIOTROPIUM BROMIDE 18 UG/1
1 CAPSULE BY INHALING THE CONTENTS OF THE CAPSULE USING THE HANDIHALER DEVICE CAPSULE ORAL; RESPIRATORY (INHALATION) ONCE A DAY
Status: ACTIVE | COMMUNITY

## 2022-02-02 RX ORDER — LAMOTRIGINE 100 MG/1
1 TABLET TABLET, FILM COATED, EXTENDED RELEASE ORAL ONCE A DAY
Status: DISCONTINUED | COMMUNITY

## 2022-02-02 RX ORDER — BUDESONIDE AND FORMOTEROL FUMARATE DIHYDRATE 160; 4.5 UG/1; UG/1
2 PUFFS AEROSOL RESPIRATORY (INHALATION) TWICE A DAY
Status: DISCONTINUED | COMMUNITY

## 2022-02-03 ENCOUNTER — TELEPHONE ENCOUNTER (OUTPATIENT)
Dept: URBAN - METROPOLITAN AREA CLINIC 92 | Facility: CLINIC | Age: 47
End: 2022-02-03

## 2022-02-03 LAB
ALBUMIN: 4.9
ALKALINE PHOSPHATASE: 107
ALT (SGPT): 30
AST (SGOT): 26
BASO (ABSOLUTE): 0.1
BASOS: 1
BILIRUBIN, DIRECT: 0.21
BILIRUBIN, TOTAL: 0.8
BUN/CREATININE RATIO: 10
BUN: 13
CARBON DIOXIDE, TOTAL: 22
CHLORIDE: 99
CREATININE: 1.26
EGFR IF AFRICN AM: 59
EGFR IF NONAFRICN AM: 51
EOS (ABSOLUTE): 0.4
EOS: 4
GLUCOSE: 102
HEMATOCRIT: 44.4
HEMATOLOGY COMMENTS:: (no result)
HEMOGLOBIN: 14.4
IMMATURE CELLS: (no result)
IMMATURE GRANS (ABS): 0
IMMATURE GRANULOCYTES: 0
LYMPHS (ABSOLUTE): 3.3
LYMPHS: 31
MCH: 29.1
MCHC: 32.4
MCV: 90
MONOCYTES(ABSOLUTE): 0.6
MONOCYTES: 6
NEUTROPHILS (ABSOLUTE): 6.3
NEUTROPHILS: 58
NRBC: (no result)
PLATELETS: 468
POTASSIUM: 3.9
PROTEIN, TOTAL: 7.8
RBC: 4.95
RDW: 13.4
SODIUM: 139
WBC: 10.7

## 2022-02-03 RX ORDER — NAPROXEN 500 MG/1
1 TABLET WITH FOOD OR MILK AS NEEDED TABLET ORAL
Status: ACTIVE | COMMUNITY

## 2022-02-03 RX ORDER — ARIPIPRAZOLE 15 MG/1
1 TABLET TABLET ORAL ONCE A DAY
Status: ACTIVE | COMMUNITY

## 2022-02-03 RX ORDER — PROPRANOLOL HYDROCHLORIDE 60 MG/1
1 CAPSULE CAPSULE, EXTENDED RELEASE ORAL ONCE A DAY
Status: ACTIVE | COMMUNITY

## 2022-02-03 RX ORDER — RABEPRAZOLE SODIUM 20 MG/1
1 TABLET TABLET, DELAYED RELEASE ORAL ONCE A DAY
Status: ACTIVE | COMMUNITY

## 2022-02-03 RX ORDER — URSODIOL 300 MG/1
1 CAPSULE CAPSULE ORAL TWICE A DAY
Qty: 60 | Refills: 2 | OUTPATIENT

## 2022-02-03 RX ORDER — TIOTROPIUM BROMIDE 18 UG/1
1 CAPSULE BY INHALING THE CONTENTS OF THE CAPSULE USING THE HANDIHALER DEVICE CAPSULE ORAL; RESPIRATORY (INHALATION) ONCE A DAY
Status: ACTIVE | COMMUNITY

## 2022-02-03 RX ORDER — FUROSEMIDE 40 MG/1
1 TABLET TABLET ORAL ONCE A DAY
Status: ACTIVE | COMMUNITY

## 2022-02-03 RX ORDER — DOXEPIN HYDROCHLORIDE 10 MG/1
3 CAPSULE AT BEDTIME CAPSULE ORAL ONCE A DAY
Status: ACTIVE | COMMUNITY

## 2022-02-03 RX ORDER — PRUCALOPRIDE 2 MG/1
1 TABLET TABLET, FILM COATED ORAL ONCE A DAY
Status: ACTIVE | COMMUNITY

## 2022-02-03 RX ORDER — SPIRONOLACTONE 100 MG/1
1 TABLET TABLET, FILM COATED ORAL ONCE A DAY
Status: ACTIVE | COMMUNITY

## 2022-02-03 RX ORDER — INSULIN GLARGINE 100 [IU]/ML
10 UNITS A DAY INJECTION, SOLUTION SUBCUTANEOUS
Status: ACTIVE | COMMUNITY

## 2022-02-03 RX ORDER — TRAMADOL HYDROCHLORIDE 100 MG/1
1 TABLET AT BEDTIME TABLET, COATED ORAL ONCE A DAY
Status: ACTIVE | COMMUNITY

## 2022-02-03 RX ORDER — ATORVASTATIN CALCIUM 20 MG/1
1 TABLET TABLET, FILM COATED ORAL ONCE A DAY
Status: ACTIVE | COMMUNITY

## 2022-02-03 RX ORDER — PROMETHAZINE HYDROCHLORIDE 25 MG/1
1 TABLET AS NEEDED TABLET ORAL
Status: ACTIVE | COMMUNITY

## 2022-02-03 RX ORDER — METOPROLOL TARTRATE 75 MG/1
1 TABLET WITH FOOD TABLET, FILM COATED ORAL QAM
Status: ACTIVE | COMMUNITY

## 2022-02-03 NOTE — HPI-TODAY'S VISIT:
Dear Chanda Segura, February 2 labs show white blood cell count 10.7 hemoglobin 14.4 platelet count up at 468.  MCV was 90 which is normal.  Neutrophils 6.3 and lymphocytes elevated at 3.3. Glucose slightly up at 102 BUN of 13 creatinine elevated at 1.26 sodium 139 potassium 3.9 chloride 99 CO2 of 22 albumin elevated at 4.9 bilirubin 0.8 direct bilirubin 0.21 alkaline phosphatase 107 AST 26 and ALT 30 so clearly that must have been that herbal smooth product that you were on and the other herbals and we can reassess from there. Called pt: she had ketorolac also. Start the ursodiol 300mg po bid with food. She will do that and follow labs. Dr Issa

## 2022-02-16 ENCOUNTER — LAB OUTSIDE AN ENCOUNTER (OUTPATIENT)
Dept: URBAN - METROPOLITAN AREA TELEHEALTH 2 | Facility: TELEHEALTH | Age: 47
End: 2022-02-16

## 2022-02-25 ENCOUNTER — TELEPHONE ENCOUNTER (OUTPATIENT)
Dept: URBAN - METROPOLITAN AREA CLINIC 92 | Facility: CLINIC | Age: 47
End: 2022-02-25

## 2022-02-25 LAB
ALBUMIN: 4.7
ALKALINE PHOSPHATASE: 175
ALT (SGPT): 34
AST (SGOT): 20
BASO (ABSOLUTE): 0.1
BASOS: 1
BILIRUBIN, DIRECT: 0.18
BILIRUBIN, TOTAL: 0.8
BUN/CREATININE RATIO: 10
BUN: 12
CARBON DIOXIDE, TOTAL: 22
CHLORIDE: 98
CREATININE: 1.15
EGFR IF AFRICN AM: 65
EGFR IF NONAFRICN AM: 57
EOS (ABSOLUTE): 0.3
EOS: 3
GLUCOSE: 107
HEMATOCRIT: 43.3
HEMATOLOGY COMMENTS:: (no result)
HEMOGLOBIN: 14.5
IMMATURE CELLS: (no result)
IMMATURE GRANS (ABS): 0
IMMATURE GRANULOCYTES: 0
LYMPHS (ABSOLUTE): 3.4
LYMPHS: 29
MCH: 30.7
MCHC: 33.5
MCV: 92
MONOCYTES(ABSOLUTE): 0.8
MONOCYTES: 7
NEUTROPHILS (ABSOLUTE): 7.1
NEUTROPHILS: 60
NRBC: (no result)
PLATELETS: 445
POTASSIUM: 3.8
PROTEIN, TOTAL: 7.7
RBC: 4.73
RDW: 13.1
SODIUM: 137
WBC: 11.7

## 2022-02-25 NOTE — HPI-TODAY'S VISIT:
Dear Chanda Segura, February 24 labs show white blood cell count elevated at 11.7 previously was 10.7.  Hemoglobin 14.5 normal platelet count 445 down from 468.  MCV 92.  Neutrophils elevated at 7.1 and lymphocytes 3.4.  Have you been ill?  Glucose 107 BUN of 12 creatinine 1.15 down from 1.26.  Normal is from 0.57 up to 1.0 so still elevated.  Sodium 137 k 3.8 chloride 98 CO2 of 22.  Albumin 4.7 normal now.  Limit 0.8 direct bilirubin 0.18 and alkaline phosphatase elevated at 175.  Previously 107.  AST 20 and ALT 34.  Previously AST 26 and ALT 30.  Need to watch this evolving alkaline phosphatase trend. Called pt: she had surgery on the right elbow and so this could be from bone and had moved ulnar nerve. She also is on some prednisone and she is on prednisone 2.5mg po qd. We need to fractionate the alkaline phosphatase. We can redo the labs next week to check on that. Dr. Issa

## 2022-02-28 ENCOUNTER — LAB OUTSIDE AN ENCOUNTER (OUTPATIENT)
Dept: URBAN - METROPOLITAN AREA CLINIC 92 | Facility: CLINIC | Age: 47
End: 2022-02-28

## 2022-03-02 ENCOUNTER — LAB OUTSIDE AN ENCOUNTER (OUTPATIENT)
Dept: URBAN - METROPOLITAN AREA TELEHEALTH 2 | Facility: TELEHEALTH | Age: 47
End: 2022-03-02

## 2022-03-08 ENCOUNTER — TELEPHONE ENCOUNTER (OUTPATIENT)
Dept: URBAN - METROPOLITAN AREA CLINIC 92 | Facility: CLINIC | Age: 47
End: 2022-03-08

## 2022-03-08 LAB
ALBUMIN: 4.9
ALKALINE PHOSPHATASE: 203
ALT (SGPT): 40
AST (SGOT): 29
BILIRUBIN, DIRECT: 0.32
BILIRUBIN, TOTAL: 1.2
BONE FRACTION:: 24
INTESTINAL FRAC.:: 0
LIVER FRACTION:: 76
PROTEIN, TOTAL: 7.8

## 2022-03-08 NOTE — HPI-TODAY'S VISIT:
Dear Chanda Segura, March 3 labs show liver alkaline phosphatase fraction is 76% bone fraction is 24% and intestinal 0%  so clearly the alkaline phosphatase is coming mainly from liver.  This is the normal pattern that one would expect. March 3 labs also show albumin slightly up at 4.9 from 4.7.  Could reflect hydrational status. Total bilirubin normal at 1.2 and direct bilirubin 0.32 suggesting a Gilbert's syndrome which is a benign finding. Alkaline phosphatase remains elevated at 203 and previously was 175.  AST 29 and ALT 40 and previously AST 20 and ALT 34. Unfortunately these labs arrived very late so we need to go over with you what medicines you are on and since when.  They are not dramatically worse but they are definitely drifting up. Dr Issa

## 2022-03-10 ENCOUNTER — TELEPHONE ENCOUNTER (OUTPATIENT)
Dept: URBAN - METROPOLITAN AREA CLINIC 92 | Facility: CLINIC | Age: 47
End: 2022-03-10

## 2022-03-10 LAB
ALBUMIN: 5
ALKALINE PHOSPHATASE: 230
ALT (SGPT): 76
AST (SGOT): 49
BASO (ABSOLUTE): 0.1
BASOS: 1
BILIRUBIN, DIRECT: 0.35
BILIRUBIN, TOTAL: 1.2
BUN/CREATININE RATIO: 8
BUN: 9
CARBON DIOXIDE, TOTAL: 19
CHLORIDE: 100
CREATININE: 1.19
EGFR: 57
EOS (ABSOLUTE): 0.4
EOS: 3
GLUCOSE: 106
HEMATOCRIT: 43.9
HEMATOLOGY COMMENTS:: (no result)
HEMOGLOBIN: 14.6
IMMATURE CELLS: (no result)
IMMATURE GRANS (ABS): 0
IMMATURE GRANULOCYTES: 0
LYMPHS (ABSOLUTE): 2.5
LYMPHS: 19
MCH: 29.9
MCHC: 33.3
MCV: 90
MONOCYTES(ABSOLUTE): 0.8
MONOCYTES: 6
NEUTROPHILS (ABSOLUTE): 9.5
NEUTROPHILS: 71
NRBC: (no result)
PLATELETS: 499
POTASSIUM: 4.1
PROTEIN, TOTAL: 7.8
RBC: 4.88
RDW: 13.3
SODIUM: 137
WBC: 13.4

## 2022-03-10 NOTE — HPI-TODAY'S VISIT:
Dear Chanda Segura, March 9 labs show white blood cell count 13.4 up from 11.7 and prior 10.7.  Hemoglobin 14.6.  Platelet count up a little at 499 from 445.  It has been variable before though.  MCV normal at 90.  Neutrophils up at 9.5 and lymphocytes 2.5 down from last time. Glucose 106 slightly up and BUN of 9 creatinine slightly higher at 1.9 and previously 1.15.   Sodium 137 potassium 4.1 chloride 100 CO2 slightly low at 19.  Albumin 4.0.  Total bilirubin 1.2 normal.  Direct bilirubin 0.35.  Alkaline phosphatase up to 233.  AST 49 and ALT 76 and previously AST 29 ALT 40. Called pt as labs trending up. Meds: propanol, furosemide, spirinolactone, basagalar, Spiriva and Symbicort, Geodon and lamotrigine. Ursodiol. Lamotrigine 1% risk for incl fts and on for a year. Geodon: category c for liver injury and occurs within 1-4 weeks and she has been on it for 6-8 weeks. She will discuss with psych re this. I suspect if we do the labs will see it start to drop some. Dr Issa

## 2022-03-21 ENCOUNTER — WEB ENCOUNTER (OUTPATIENT)
Dept: URBAN - METROPOLITAN AREA CLINIC 86 | Facility: CLINIC | Age: 47
End: 2022-03-21

## 2022-03-21 ENCOUNTER — LAB OUTSIDE AN ENCOUNTER (OUTPATIENT)
Dept: URBAN - METROPOLITAN AREA CLINIC 86 | Facility: CLINIC | Age: 47
End: 2022-03-21

## 2022-03-22 ENCOUNTER — TELEPHONE ENCOUNTER (OUTPATIENT)
Dept: URBAN - METROPOLITAN AREA CLINIC 92 | Facility: CLINIC | Age: 47
End: 2022-03-22

## 2022-03-22 LAB
ALBUMIN: 4.8
ALKALINE PHOSPHATASE: 202
ALT (SGPT): 41
AST (SGOT): 36
BASO (ABSOLUTE): 0.1
BASOS: 1
BILIRUBIN, DIRECT: 0.29
BILIRUBIN, TOTAL: 1.1
BUN/CREATININE RATIO: 7
BUN: 7
CARBON DIOXIDE, TOTAL: 18
CHLORIDE: 101
CREATININE: 1.01
EGFR: 69
EOS (ABSOLUTE): 0.2
EOS: 2
GLUCOSE: 104
HEMATOCRIT: 42
HEMATOLOGY COMMENTS:: (no result)
HEMOGLOBIN: 14.1
IMMATURE CELLS: (no result)
IMMATURE GRANS (ABS): 0
IMMATURE GRANULOCYTES: 0
LYMPHS (ABSOLUTE): 2.1
LYMPHS: 22
MCH: 30.3
MCHC: 33.6
MCV: 90
MONOCYTES(ABSOLUTE): 0.6
MONOCYTES: 6
NEUTROPHILS (ABSOLUTE): 6.8
NEUTROPHILS: 69
NRBC: (no result)
PLATELETS: 444
POTASSIUM: 4.2
PROTEIN, TOTAL: 7.6
RBC: 4.66
RDW: 13.3
SODIUM: 136
WBC: 9.7

## 2022-03-22 NOTE — HPI-TODAY'S VISIT:
Dear Chanda Segura, March 21 labs show glucose still slightly up at 104 previously 106.  BUN of 7 and creatinine down to 1.01 from 1.19 so that was better.  Sodium 136 potassium 4.2 chloride 101 CO2 slightly low at 18. Total bilirubin normal at 1.1 previously 1.2 direct bilirubin 0.29 and I would suggest that you have Gilbert's.  Gilbert's as you recall is that indirect hyperbilirubinemia that is very common and present about 10% of people. The alkaline phosphatase is down to 202 from 230 and the AST is down to 36 from 49 and ALT 41 from 76.  It would appear that you change something? White blood cell count 9.7 hemoglobin 14.1 platelet count 444 down from 499. Important to note the white cell count also came down from 13.4 to 9.7.  Neutrophils 6.8 and lymphocytes 2.1 both normal. Called pt re the labs. She says did not change meds. 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  We will follow the labs trends. Many of these chemicals for [employer] and potential health risks of not well vented. Dr Issa

## 2022-03-31 ENCOUNTER — WEB ENCOUNTER (OUTPATIENT)
Dept: URBAN - METROPOLITAN AREA CLINIC 86 | Facility: CLINIC | Age: 47
End: 2022-03-31

## 2022-04-04 ENCOUNTER — LAB OUTSIDE AN ENCOUNTER (OUTPATIENT)
Dept: URBAN - METROPOLITAN AREA CLINIC 86 | Facility: CLINIC | Age: 47
End: 2022-04-04

## 2022-04-05 ENCOUNTER — WEB ENCOUNTER (OUTPATIENT)
Dept: URBAN - METROPOLITAN AREA TELEHEALTH 2 | Facility: TELEHEALTH | Age: 47
End: 2022-04-05

## 2022-04-05 ENCOUNTER — TELEPHONE ENCOUNTER (OUTPATIENT)
Dept: URBAN - METROPOLITAN AREA CLINIC 92 | Facility: CLINIC | Age: 47
End: 2022-04-05

## 2022-04-05 LAB
ALBUMIN: 4.8
ALKALINE PHOSPHATASE: 187
ALT (SGPT): 33
AST (SGOT): 24
BASO (ABSOLUTE): 0.1
BASOS: 1
BILIRUBIN, DIRECT: 0.28
BILIRUBIN, TOTAL: 1.2
BUN/CREATININE RATIO: 9
BUN: 9
CARBON DIOXIDE, TOTAL: 22
CHLORIDE: 99
CREATININE: 1.05
EGFR: 66
EOS (ABSOLUTE): 0.3
EOS: 2
GLUCOSE: 93
HEMATOCRIT: 45
HEMATOLOGY COMMENTS:: (no result)
HEMOGLOBIN: 14.9
IMMATURE CELLS: (no result)
IMMATURE GRANS (ABS): 0
IMMATURE GRANULOCYTES: 0
LYMPHS (ABSOLUTE): 3.5
LYMPHS: 29
MCH: 29.7
MCHC: 33.1
MCV: 90
MONOCYTES(ABSOLUTE): 0.9
MONOCYTES: 8
NEUTROPHILS (ABSOLUTE): 7.3
NEUTROPHILS: 60
NRBC: (no result)
PLATELETS: 462
POTASSIUM: 4.7
PROTEIN, TOTAL: 7.5
RBC: 5.01
RDW: 13.2
SODIUM: 139
WBC: 12.1

## 2022-04-05 NOTE — HPI-TODAY'S VISIT:
Dear Chanda Segura, April 4, 2022 labs show glucose 93 which is normal BUN of 9 creatinine slightly up at 1.05 and was previously 1.01. May want to look at your hydrational state. Sodium 139 potassium 4.7 chloride 99 CO2 of 22. Albumin 4.8 bilirubin 1.2 which is normal and direct bilirubin 0.28 suggesting that you have Gilbert's syndrome or that indirect hyperbilirubinemia that is benign and present about 10% of people. Alkaline phosphatase down to 187 from 202 and down previously from 230.  AST of 24 down from 36 and prior 49. ALT 33 down from 41 and ALT 76. Called: pt  labs better not around the resin work exposures and on ursodiol. She also asked re asa and crestor and would need to start low and see how does. She says on one now and higher dose and labs stable so far. Dr Issa

## 2022-04-11 ENCOUNTER — OFFICE VISIT (OUTPATIENT)
Dept: URBAN - METROPOLITAN AREA TELEHEALTH 2 | Facility: TELEHEALTH | Age: 47
End: 2022-04-11
Payer: COMMERCIAL

## 2022-04-11 DIAGNOSIS — K63.5 POLYP OF COLON, UNSPECIFIED PART OF COLON, UNSPECIFIED TYPE: ICD-10-CM

## 2022-04-11 DIAGNOSIS — D80.2 IGA DEFICIENCY: ICD-10-CM

## 2022-04-11 DIAGNOSIS — F10.11 ALCOHOL USE DISORDER, MILD, IN EARLY REMISSION, ABUSE: ICD-10-CM

## 2022-04-11 DIAGNOSIS — E66.3 OVERWEIGHT: ICD-10-CM

## 2022-04-11 DIAGNOSIS — F31.30 BIPOLAR AFFECTIVE DISORDER, CURRENT EPISODE DEPRESSED, CURRENT EPISODE SEVERITY UNSPECIFIED: ICD-10-CM

## 2022-04-11 DIAGNOSIS — M13.80 SERONEGATIVE ARTHRITIS: ICD-10-CM

## 2022-04-11 DIAGNOSIS — G44.009: ICD-10-CM

## 2022-04-11 DIAGNOSIS — F14.11 COCAINE ABUSE IN REMISSION: ICD-10-CM

## 2022-04-11 DIAGNOSIS — K75.4 AUTOIMMUNE HEPATITIS: ICD-10-CM

## 2022-04-11 DIAGNOSIS — Z90.710 HISTORY OF HYSTERECTOMY: ICD-10-CM

## 2022-04-11 DIAGNOSIS — F33.1 MODERATE EPISODE OF RECURRENT MAJOR DEPRESSIVE DISORDER: ICD-10-CM

## 2022-04-11 DIAGNOSIS — K71.9 DRUG-INDUCED LIVER DISEASE: ICD-10-CM

## 2022-04-11 PROCEDURE — 99214 OFFICE O/P EST MOD 30 MIN: CPT

## 2022-04-11 RX ORDER — ATORVASTATIN CALCIUM 40 MG/1
1 TABLET TABLET, FILM COATED ORAL ONCE A DAY
Status: ACTIVE | COMMUNITY

## 2022-04-11 RX ORDER — PROPRANOLOL HYDROCHLORIDE 60 MG/1
1 CAPSULE CAPSULE, EXTENDED RELEASE ORAL ONCE A DAY
Status: ACTIVE | COMMUNITY

## 2022-04-11 RX ORDER — ZIPRASIDONE HYDROCHLORIDE 60 MG/1
1 CAPSULE WITH FOOD CAPSULE ORAL
Status: ACTIVE | COMMUNITY

## 2022-04-11 RX ORDER — URSODIOL 300 MG/1
1 CAPSULE CAPSULE ORAL TWICE A DAY
Qty: 60 | Refills: 2

## 2022-04-11 RX ORDER — CITALOPRAM HYDROBROMIDE 10 MG/1
1 TABLET TABLET, FILM COATED ORAL ONCE A DAY
Status: ACTIVE | COMMUNITY

## 2022-04-11 RX ORDER — INSULIN GLARGINE 100 [IU]/ML
10 UNITS A DAY INJECTION, SOLUTION SUBCUTANEOUS
Status: ACTIVE | COMMUNITY

## 2022-04-11 RX ORDER — TRAMADOL HYDROCHLORIDE 100 MG/1
1 TABLET AT BEDTIME TABLET, COATED ORAL ONCE A DAY
Status: DISCONTINUED | COMMUNITY

## 2022-04-11 RX ORDER — NAPROXEN 500 MG/1
1 TABLET WITH FOOD OR MILK AS NEEDED TABLET ORAL
Status: DISCONTINUED | COMMUNITY

## 2022-04-11 RX ORDER — ARIPIPRAZOLE 10 MG/1
1 TABLET TABLET ORAL ONCE A DAY
Status: DISCONTINUED | COMMUNITY

## 2022-04-11 RX ORDER — LANSOPRAZOLE 30 MG/1
1 CAPSULE BEFORE A MEAL CAPSULE, DELAYED RELEASE ORAL ONCE A DAY
Status: ACTIVE | COMMUNITY

## 2022-04-11 RX ORDER — RABEPRAZOLE SODIUM 20 MG/1
1 TABLET TABLET, DELAYED RELEASE ORAL ONCE A DAY
Status: DISCONTINUED | COMMUNITY

## 2022-04-11 RX ORDER — DOXEPIN HYDROCHLORIDE 10 MG/1
3 CAPSULE AT BEDTIME CAPSULE ORAL ONCE A DAY
Status: ACTIVE | COMMUNITY

## 2022-04-11 RX ORDER — TIOTROPIUM BROMIDE 18 UG/1
1 CAPSULE BY INHALING THE CONTENTS OF THE CAPSULE USING THE HANDIHALER DEVICE CAPSULE ORAL; RESPIRATORY (INHALATION) ONCE A DAY
Status: ACTIVE | COMMUNITY

## 2022-04-11 RX ORDER — FUROSEMIDE 40 MG/1
1 TABLET TABLET ORAL
Status: ACTIVE | COMMUNITY

## 2022-04-11 RX ORDER — PREDNISONE 2.5 MG/1
1 TABLET TABLET ORAL ONCE A DAY
Status: ACTIVE | COMMUNITY

## 2022-04-11 RX ORDER — ERGOCALCIFEROL 1.25 MG/1
1 CAPSULE CAPSULE, LIQUID FILLED ORAL
Status: ACTIVE | COMMUNITY

## 2022-04-11 RX ORDER — PROMETHAZINE HYDROCHLORIDE 25 MG/1
1 TABLET AS NEEDED TABLET ORAL
Status: ACTIVE | COMMUNITY

## 2022-04-11 RX ORDER — METOPROLOL TARTRATE 75 MG/1
1 TABLET WITH FOOD TABLET, FILM COATED ORAL QAM
Status: DISCONTINUED | COMMUNITY

## 2022-04-11 RX ORDER — URSODIOL 300 MG/1
1 CAPSULE CAPSULE ORAL TWICE A DAY
Qty: 60 | Refills: 2 | Status: ACTIVE | COMMUNITY

## 2022-04-11 RX ORDER — PRUCALOPRIDE 2 MG/1
1 TABLET TABLET, FILM COATED ORAL ONCE A DAY
Status: ACTIVE | COMMUNITY

## 2022-04-11 RX ORDER — SPIRONOLACTONE 100 MG/1
1 TABLET TABLET, FILM COATED ORAL ONCE A DAY
Status: ACTIVE | COMMUNITY

## 2022-04-11 NOTE — HPI-TODAY'S VISIT:
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  She is more stable on meds.  The last reaction a while ago and on the ursodiol and that is helping.  Pt says labs in Jan 2022 were up and she was ordered a medicine that had diclofenac and she instead was given tramadol.  Geodon started early in Jan.  April 4, 2022 labs show glucose 93 which is normal BUN of 9 creatinine slightly up at 1.05 and was previously 1.01. May want to look at your hydrational state. Sodium 139 potassium 4.7 chloride 99 CO2 of 22. Albumin 4.8 bilirubin 1.2 which is normal and direct bilirubin 0.28 suggesting that you have Gilbert's syndrome or that indirect hyperbilirubinemia that is benign and present about 10% of people. Alkaline phosphatase down to 187 from 202 and down previously from 230.  AST of 24 down from 36 and prior 49. ALT 33 down from 41 and ALT 76.  She had a prior bump in lfts from working on epoxys.   She also asked re asa and crestor and  she has not started the aspirin yet and she is to start to crestor and she will start post the next labs.  March 21 labs show glucose still slightly up at 104 previously 106.  BUN of 7 and creatinine down to 1.01 from 1.19 so that was better.  Sodium 136 potassium 4.2 chloride 101 CO2 slightly low at 18. Total bilirubin normal at 1.1 previously 1.2 direct bilirubin 0.29 and I would suggest that you have Gilbert's.  Gilbert's as you recall is that indirect hyperbilirubinemia that is very common and present about 10% of people. The alkaline phosphatase is down to 202 from 230 and the AST is down to 36 from 49 and ALT 41 from 76.  It would appear that you change something? White blood cell count 9.7 hemoglobin 14.1 platelet count 444 down from 499. Important to note the white cell count also came down from 13.4 to 9.7.  Neutrophils 6.8 and lymphocytes 2.1 both normal. Called pt re the labs. She says did not change meds. 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   March 9 labs show white blood cell count 13.4 up from 11.7 and prior 10.7.  Hemoglobin 14.6.  Platelet count up a little at 499 from 445.  It has been variable before though.  MCV normal at 90.  Neutrophils up at 9.5 and lymphocytes 2.5 down from last time. Glucose 106 slightly up and BUN of 9 creatinine slightly higher at 1.9 and previously 1.15.   Sodium 137 potassium 4.1 chloride 100 CO2 slightly low at 19.  Albumin 4.0.  Total bilirubin 1.2 normal.  Direct bilirubin 0.35.  Alkaline phosphatase up to 233.  AST 49 and ALT 76 and previously AST 29 ALT 40. Called pt as labs trending up. Meds: propanol, furosemide, spirinolactone, basagalar, Spiriva and Symbicort, Geodon and lamotrigine. Ursodiol. Lamotrigine 1% risk for incl fts and on for a year. Geodon: category c for liver injury and occurs within 1-4 weeks and she has been on it for 6-8 weeks. She will discuss with psych re this.   March 3 labs show liver alkaline phosphatase fraction is 76% bone fraction is 24% and intestinal 0%  so clearly the alkaline phosphatase is coming mainly from liver.  This is the normal pattern that one would expect. March 3 labs also show albumin slightly up at 4.9 from 4.7.  Could reflect hydrational status. Total bilirubin normal at 1.2 and direct bilirubin 0.32 suggesting a Gilbert's syndrome which is a benign finding. Alkaline phosphatase remains elevated at 203 and previously was 175.  AST 29 and ALT 40 and previously AST 20 and ALT 34. Unfortunately these labs arrived very late so we need to go over with you what medicines you are on and since when.  They are not dramatically worse but they are definitely drifting up.  February 24 labs show white blood cell count elevated at 11.7 previously was 10.7.  Hemoglobin 14.5 normal platelet count 445 down from 468.  MCV 92.  Neutrophils elevated at 7.1 and lymphocytes 3.4.  Have you been ill?  Glucose 107 BUN of 12 creatinine 1.15 down from 1.26.  Normal is from 0.57 up to 1.0 so still elevated.  Sodium 137 k 3.8 chloride 98 CO2 of 22.  Albumin 4.7 normal now.  Limit 0.8 direct bilirubin 0.18 and alkaline phosphatase elevated at 175.  Previously 107.  AST 20 and ALT 34.  Previously AST 26 and ALT 30.  Need to watch this evolving alkaline phosphatase trend. Called pt: she had surgery on the right elbow and so this could be from bone and had moved ulnar nerve. She also is on some prednisone and she is on prednisone 2.5mg po qd. We need to fractionate the alkaline phosphatase. We can redo the labs next week to check on that.  February 2 labs show white blood cell count 10.7 hemoglobin 14.4 platelet count up at 468.  MCV was 90 which is normal.  Neutrophils 6.3 and lymphocytes elevated at 3.3. Glucose slightly up at 102 BUN of 13 creatinine elevated at 1.26 sodium 139 potassium 3.9 chloride 99 CO2 of 22 albumin elevated at 4.9 bilirubin 0.8 direct bilirubin 0.21 alkaline phosphatase 107 AST 26 and ALT 30 so clearly that must have been that herbal smooth product that you were on and the other herbals and we can reassess from there. Called pt: she had ketorolac also. Start the ursodiol 300mg po bid with food. She will do that and follow labs.  Started atorvastatin since dec 2020 and she is still on that for now and then to do the crestor,  January 4 local labs sent in. Glucose 159 elevated please share with primary provider.  He had a 16 creatinine 0.96 sodium 138 potassium 4.2 chloride 99 calcium 9.9 total bili 0.4 alkaline phosphatase 202 and . Prior December 29 labs showed alk phos 114 AST 28 and ALT 45 so clearly those numbers bumped. White blood cell count 8.4 hemoglobin 13.2 platelet count 350. Cholesterol 226 elevated.  Triglyceride 284 elevated.   elevated.  Hemoglobin A1c slightly better at 7.9 from 8.1%.  TSH 1.78.  Magnesium 2.2. Called pt: need new labs to see what labs doing.   They did an u.s locally also and she says was  told fatty liver.  Dec 29 labs: ast 48 and alt 31 and alk 111 and tb 0.4.   December 13 2021  labs show white blood  cell count elevated 11.9 but down from 12.6.  Hemoglobin 15 and platelets up at 503 and previously 394.  That can sometimes go up with inflammation or medicines.  Please be sure to share this info with primary providers. MCV normal at 90.  Neutrophils elevated 7.4 but down from 10.1 back on December 2.  Lymphocytes normal at 3.1 and monocytes slightly up at 1.0. Glucose elevated at 149 and was previously 114.  Hemoglobin A1c up to 8.1 and was previously 6.3.  You should look at this sugar issue again with your primary provider as that is unusual for you. BUN of 12 creatinine 1.19 which is up from 1.014 sodium 136 potassium 4.2 chloride 98 CO2 20. Calcium at 10.4 and was previously normal at 9.6 and I wonder if it is medicine related? Albumin 4.6 bilirubin 0.8 alkaline phosphatase slightly higher at 130 previously 126. AST down to 32 and ALT down to 49 previously AST 65 and ALT 66. C-reactive protein noted to be normal at 2.  Dec 1 labs much better: Tp 6.9 and alb 4.2 and tb 0.2 and db 0.10 and alk 111 and ast 19 and alt 43 and down from prior alk 301 and ast 77 and alt 171. So doing much better. Keep track of any new meds added or removed.  November 98 labs show 123 which is elevated BUN of 18 creatinine 0.99 sodium 137 potassium 5.1 chloride 99 CO2 of 24 albumin 4.7 bilirubin 0.5 alkaline phosphatase down to 118 from 194.  AST 29 and previously 21 ALT down to 46 from 53.  So clearly centimeters drop. She says she had increased the Seroquel and then being weaned on it now. She is now on 50 mg of Seroquel and she is to stop it tonight and she will redo labs in 1-2 weeks to be sure.  Ca 10.3 little up and she is not on supplements for this and follow course. She will let primary md know.  Oct 12: alb 4.5 and tb 0.6 and db 0.19 and alk 301 and ast 77 and alt 171 and prior ast 33 and alt 52. went to 25 and 50 and now 100mg.  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 Says taking dilaudid and ibuprofen for that. She says taking a dilaudid occ and one ibuprofen. 10-1 labs also off. Still on steroids 10mg po qd for the rheum arthritis so enbrel stopped working and went to this. She did myasthenia screen and neg and started on this and trying to do humira sunday.   10-1 alk 199 and ast 56 and alt 149.   September 21 ultrasound showed the liver to be 15.6 cm with mild hepatic steatosis but no focal mass. Liver vessels are noted to be patent. Prior cholecystectomy changes seen. No bile duct dilation seen with common bile duct 5 mm. Visualized pancreas portions were unremarkable proportions of the head and tail the pancreas were not seen due to gas. Right kidney 10.6 cm and left kidney 10.8 cm with no hydronephrosis. Spleen normal at 8.5 cm. Mild liver fat overall was seen patent vessels were seen. No ascites was seen.  September 21 labs show albumin 4.9 which remains slightly up bilirubin 0.4 which is down from 0.8 direct bilirubin 0.13 which is down from 0.23. Alkaline phosphatase down to 198 from 207 AST slightly up at 33 from 28 ALT 52 down from 66.  September 9 redo labs show albumin stable 4.9 bilirubin 0.8 direct 0.23 alkaline phosphatase slightly lower at 207 from 215. AST down to 28 from 61. ALT down to 66 from 86.  So it does appear the labs are dropping with lamictal drop 150 to 100mg and then went up on seroquel. Sept 7 labs: alb 4.8 and tb 0.7 and db 0.2 and alk 215 and Ast 61 and alt 86.  Prior aug 27 albs ast 18 and alt 28 and alk 135. that was pre lamictal changed.  She had prior drinking green tea for a few weeks. She has an immune issue and so the labs spiked up. then stopped and labs settled last year off that.  Pt was to do a bx and trouble breathing and Dr Self told to do barium swallow and manometry and did the barium and to do the manometry and says told was ok.  Ent saw some plaques and she had bx done.  Pelvic area pain noted and se started with pain and went to [place] and [place] [facility] [place] [facility] and she said cyst in fallopian tube and ruptured and did a full hysterectomy and that was July 28 2021.  Ex  burned her citizenship certificate. She said she applied to get it. 555.00 for that.  Enbrel started earlier for seronegative RA.  She says needed onc re wbc up and asked him to do the lfts and she says part of labs back and she has from aug 8 2021. Ast and alt done and alk 209 and prior 138 and ast 149 and alt prior 34 and alt 231 and 76 july 19. b12 1804 and IGA 68 little low.  May 3:wbc 9.6 hg 13.7 and plat 355 and mcv 93. Neutrophils 6.5. glu 92 and cr 0.95 and na 137 and k 4.3 and ca 9.8 and alb 4.5 and tb 0.3 and alk 177 elevated and ast 30 and alt 70 and tesosterone level 6 normal per chart for your age. Prior alk 277 and ast 126 and alt 315. So came down post that doxycycline exposure.  April 6, 2021 [facility]s at [facility]: pancreas appears normal where seen and liver demonstrates normal size and contour and echogenicity. Liver vessels patent. Gallbladder absent. Common bile duct 4mm. No hydronephrosis seen to kidneys. Spleen 8.4cm. No liver mass seen.  Jan 11: glu 171 elevated and show local md. cr 1.06 and na 137 and k 4.0 and cl 98 and co2 21 and ca 9.7 normal and alb 4.6 and tb 0.7 and alk 88 and ast 13 and alt 16. So liver labs doing very well. A1c 5.9% noted.  Jan 20 2021: ct no cardiomegaly. Clear lung bases. Unremarkable liver. Prior gb surgery changes. Unremarkable spleen. Pancreas and and adrenal glands normal. cyst right kidney. left renal cyst. Mild rectosigmoid wall thickening. Liver vessels patent. No bulk adenopath.  Nov 9 2020 labs: wbc 13. 4hg 14 and plat 453 elevated slightly (150- 450 normal). na 135 k 4.6 and cl 97 and co2 26 and calcium elevated 11.0.  Show to local md: why is calcium up?alb 4.5 and tb 0.6 and alk 165 elevated and ast 22 and alt 36 so back down. chol 336 elevated and trg 307 and ldl 219. Lipids off and not sure if due to recent liver flare or other and may want to redo that as known if liver flared that this can be off. Vit d low 28.8. a1c 5.9%.  She says has had low vit d and she was to start this and she was started on 5000 a day for 4 m and she had been taking some vit d. She says she had stopped it for surgery July and restarted it again.  She says was on doxycycline and said had a prior hx of tcn reaction and filled and she said she may have been more ill from it. No reexposure.  Nov 3 2020 : ast 50 and alt 121 (both down then from 121 and 222). main she is off green tea and the lamictal also off prior. Suspect green tea was immune stimulant reving up your immune system. She has a very active immune system.  Rheum says she has fibromyalgia and doing water therapy.  11-2-20: ast down to 50 and alt 118 and prior 121 and alt 222 . alk 213 still up from 210 and tb down to 0.4 from 0.5.  Oct 26 labs tb 0.5 and alk 210 and ast 121 and alt 222. alk 248 and so now 210 so that is better, and ast 128 and that is now 121 and alt was 248 and now 222 so better and we need to follow.  Oct 19 labs spiked again: alk 198 and ast 128 and alt 248. Tb 0.5. na 136 and k k 4.5. bun 15 and cr 0.94 and glu 112.   Oct 6 ast 16 and alt 25 and alk 111. Prior she was also on abx for 6 weeks and so finished oct 8 or so and so that not the cause.  Oct 6: wbc 11.2 little up, hg 14.4 and hct 42.2, plat 141. Mcv 90, glu 103 little up and maybe not fasting. Bun 20 and cr 0.98 and na 135 and k 4.5, cl 99 and co2 22. Alb 4.2 and tb 0.5 and alk 111 and ast 16 and alt 25.  Prior visit lamictal came down from 75/50 to 50/25 as of oct 9 and added gabapentin 400mg and seroquel 25mg po qhs an atorvastatin 40mg po qhs.  Off the prior nasal exposures also.  Aunt Ewelina passed in oct 2020 had oltx and she had failed from 2nd tx. Not from covid 19.  U.s aug 2020 [facility]: they saw mild fatty infiltration of the liver. Prior gb surgery changes seen. Visualized pancreas portions normal. No liver masses. Common duct 4mm. Portal vein patent. Kidneys unremarkable. Spleen 7.8cm unremarkable. Should get better as time passes from the issues we discussed.  Aug 4 2020 labs: glu 61 and little low bun 17 and cr 0.91 and na 135 and k 5.2 and cl 96 and co2 20 and ca 10.9 elevated and show local md. alb 4.7 and tb 0.6 and alk 104 and ast 24 and alt 28.  She was on ursodiol for has been on it for her liver started july 29 and she did stop it . She is staying off this as last time ok and may need it.  Nov 25 2020: wbc 11.3 and hg 14.1 plat 484 elevated and neutrophils 7.7 and glu 144 elevated and cr 1.08 little up.na 139 and k 4.3 and cl 99 and co2 24 and ca 10 and alb 4,7 and tb 0.3 and alk 130 and ast 15 and alt 18. Addendum: 1. Smoothie immune booster did. 2. Michelle took that. 3. She will confirm medicine.  Plan: 1.  Need the u.s that she did in Jan 2022. 2.  She will do labs in 3 and 6 and 12 weeks. 3. Pt will see is us in 3m. 4. Not opposed to do crestor trial when finishes the atorvastatin.  5. Stay on the ursodiol.  Stressed to pt the need for social distancing and strict handwashing and wearing a mask and to follow any other new or added CDC recommendations as this is an evolving target.   Duration of visit was 30  min with 5m of prep and then 25 min by milton with greater than 50% of time spent reviewing chart and events with the patient and in discussing plans.

## 2022-04-12 ENCOUNTER — ERX REFILL RESPONSE (OUTPATIENT)
Dept: URBAN - METROPOLITAN AREA CLINIC 86 | Facility: CLINIC | Age: 47
End: 2022-04-12

## 2022-04-12 RX ORDER — URSODIOL 300 MG/1
TAKE 1 CAPSULE BY MOUTH TWICE (2) DAILY CAPSULE ORAL
Qty: 60 CAPSULE | Refills: 3 | OUTPATIENT

## 2022-04-12 RX ORDER — URSODIOL 300 MG/1
1 CAPSULE CAPSULE ORAL TWICE A DAY
Qty: 60 | Refills: 2 | OUTPATIENT

## 2022-04-18 ENCOUNTER — LAB OUTSIDE AN ENCOUNTER (OUTPATIENT)
Dept: URBAN - METROPOLITAN AREA CLINIC 86 | Facility: CLINIC | Age: 47
End: 2022-04-18

## 2022-04-19 ENCOUNTER — TELEPHONE ENCOUNTER (OUTPATIENT)
Dept: URBAN - METROPOLITAN AREA CLINIC 92 | Facility: CLINIC | Age: 47
End: 2022-04-19

## 2022-04-19 LAB
ALBUMIN: 5
ALKALINE PHOSPHATASE: 180
ALT (SGPT): 44
AST (SGOT): 27
BASO (ABSOLUTE): 0.1
BASOS: 1
BILIRUBIN, DIRECT: 0.32
BILIRUBIN, TOTAL: 1.4
BUN/CREATININE RATIO: 11
BUN: 12
CARBON DIOXIDE, TOTAL: 18
CHLORIDE: 94
CREATININE: 1.06
EGFR: 65
EOS (ABSOLUTE): 0.2
EOS: 2
GLUCOSE: 75
HEMATOCRIT: 46.3
HEMATOLOGY COMMENTS:: (no result)
HEMOGLOBIN: 14.9
IMMATURE CELLS: (no result)
IMMATURE GRANS (ABS): 0
IMMATURE GRANULOCYTES: 0
LYMPHS (ABSOLUTE): 3.5
LYMPHS: 31
MCH: 29.2
MCHC: 32.2
MCV: 91
MONOCYTES(ABSOLUTE): 1
MONOCYTES: 9
NEUTROPHILS (ABSOLUTE): 6.4
NEUTROPHILS: 57
NRBC: (no result)
PLATELETS: 426
POTASSIUM: 5
PROTEIN, TOTAL: 7.6
RBC: 5.1
RDW: 13.7
SODIUM: 135
WBC: 11.1

## 2022-04-19 RX ORDER — URSODIOL 300 MG/1
1 CAPSULE CAPSULE ORAL
Qty: 90 | Refills: 1

## 2022-04-19 NOTE — HPI-TODAY'S VISIT:
Dear Chanda Segura,  April 18 labs show glucose 75 BUN of 12 creatinine slightly up at 1.06 from 1.05.  Sodium 135 potassium 4.0 chloride slightly low at 94 CO2 slightly low at 18 please share with primary provider. Albumin was slightly up at 5.0 total bilirubin 1.4 but the direct 0.3-0 primarily indirect and consistent with your Gilbert's.  Alk phos elevated at 180 but down from 187 and prior 202.  AST 27 and up slightly from 24.  ALT 44 up slightly from 33.  Not too different from your prior 36 AST and ALT 41 back on March 21. White blood cell count 11.1 still slightly up and down from 12.1.  Hemoglobin 14.9 plate count 426 MCV 91.  Good to see that the platelets came down from 4 62-4 26. Neutrophils down 6.4 and lymphocytes 3.5.  Monocytes slightly up at 1.0. Spoke with pt and only new med is Nicorette lozenge so not likely to do. Could inc to tid ursodiol and see how does and we will do that and redo labs in 2-3 weeks and see how doing. Dr Issa

## 2022-04-29 ENCOUNTER — TELEPHONE ENCOUNTER (OUTPATIENT)
Dept: URBAN - METROPOLITAN AREA CLINIC 92 | Facility: CLINIC | Age: 47
End: 2022-04-29

## 2022-04-29 ENCOUNTER — LAB OUTSIDE AN ENCOUNTER (OUTPATIENT)
Dept: URBAN - METROPOLITAN AREA TELEHEALTH 2 | Facility: TELEHEALTH | Age: 47
End: 2022-04-29

## 2022-04-29 RX ORDER — URSODIOL 300 MG/1
1 CAPSULE CAPSULE ORAL
Qty: 270 | Refills: 1

## 2022-05-01 ENCOUNTER — TELEPHONE ENCOUNTER (OUTPATIENT)
Dept: URBAN - METROPOLITAN AREA CLINIC 92 | Facility: CLINIC | Age: 47
End: 2022-05-01

## 2022-05-01 NOTE — HPI-TODAY'S VISIT:
Dear Chanda Segura, January 19, 2022 ultrasound sent in. Liver appeared to be increased abd coarsened in regards to echogenicity.  No definite liver lesions.  No dilated ducts. Normal hepatopetal flow seen in the main portal vein.  Imaged hepatic veins also were patent. Common bile duct normal at 5 mm. Gallbladder absent. Right kidney 10.6 cm with no hydronephrosis. Left kidney 10.5 cm with no hydronephrosis.  Spleen normal at 8.7 cm. They suspected that the liver could have moderate diffuse fatty infiltration.  I would state that this was also at the time that you are having a medication effect so it could be that threw that reading off.  We need to follow this u.s again now that your labs are much better. Dr. Issa

## 2022-05-03 ENCOUNTER — LAB OUTSIDE AN ENCOUNTER (OUTPATIENT)
Dept: URBAN - METROPOLITAN AREA CLINIC 86 | Facility: CLINIC | Age: 47
End: 2022-05-03

## 2022-05-04 ENCOUNTER — TELEPHONE ENCOUNTER (OUTPATIENT)
Dept: URBAN - METROPOLITAN AREA CLINIC 92 | Facility: CLINIC | Age: 47
End: 2022-05-04

## 2022-05-04 LAB
ALBUMIN: 4.7
ALKALINE PHOSPHATASE: 220
ALT (SGPT): 65
AST (SGOT): 38
BASO (ABSOLUTE): 0.1
BASOS: 1
BILIRUBIN, DIRECT: 0.27
BILIRUBIN, TOTAL: 1
BUN/CREATININE RATIO: 11
BUN: 12
CARBON DIOXIDE, TOTAL: 21
CHLORIDE: 101
CREATININE: 1.13
EGFR: 60
EOS (ABSOLUTE): 0.2
EOS: 2
GLUCOSE: 108
HEMATOCRIT: 45.2
HEMATOLOGY COMMENTS:: (no result)
HEMOGLOBIN: 14.8
IMMATURE CELLS: (no result)
IMMATURE GRANS (ABS): 0
IMMATURE GRANULOCYTES: 0
LYMPHS (ABSOLUTE): 2.5
LYMPHS: 25
MCH: 29.5
MCHC: 32.7
MCV: 90
MONOCYTES(ABSOLUTE): 0.7
MONOCYTES: 7
NEUTROPHILS (ABSOLUTE): 6.5
NEUTROPHILS: 65
NRBC: (no result)
PLATELETS: 430
POTASSIUM: 4.5
PROTEIN, TOTAL: 7.3
RBC: 5.01
RDW: 13.8
SODIUM: 139
WBC: 10

## 2022-05-04 NOTE — HPI-TODAY'S VISIT:
Dear Chanda Segura, May 3 labs show glucose slightly up at 108 and may be not fasting that day?  BUN of 12 creatinine elevated at 1.13 previously 1.06.  Sodium 139 potassium 4.5 chloride 101 CO2 21.  The chloride and CO2 are back to normal which they were off last time. White blood cell count down to 10 hemoglobin 14.8 platelet count 430 MCV 90 neutrophils normal at 6.5 lymphocytes normal now at 2.5. Albumin 4.7 also down from 5.0.  Bilirubin normal now at 1.0 alkaline phosphatase still elevated though at 220 previously was 180.  AST was 38 and ALT 65.  Previously AST 27 and ALT 44. Called pt and no new meds. Did drop citalopram to 10mg po qd and she is now off. Soumya 300mg po tid for 2 weeks or so. Asked if gaining weight and she is not. Smoking a lot of cigarettes and I have seen that before but not common to see. She will work on this and see how she does. She will try to work on that. Redo the labs in 2 weeks. Dr Issa

## 2022-05-08 ENCOUNTER — TELEPHONE ENCOUNTER (OUTPATIENT)
Dept: URBAN - METROPOLITAN AREA CLINIC 92 | Facility: CLINIC | Age: 47
End: 2022-05-08

## 2022-05-08 RX ORDER — URSODIOL 300 MG/1
1 CAPSULE CAPSULE ORAL
Qty: 270 | Refills: 1

## 2022-05-19 ENCOUNTER — LAB OUTSIDE AN ENCOUNTER (OUTPATIENT)
Dept: URBAN - METROPOLITAN AREA CLINIC 86 | Facility: CLINIC | Age: 47
End: 2022-05-19

## 2022-05-20 ENCOUNTER — LAB OUTSIDE AN ENCOUNTER (OUTPATIENT)
Dept: URBAN - METROPOLITAN AREA TELEHEALTH 2 | Facility: TELEHEALTH | Age: 47
End: 2022-05-20

## 2022-05-20 ENCOUNTER — TELEPHONE ENCOUNTER (OUTPATIENT)
Dept: URBAN - METROPOLITAN AREA CLINIC 92 | Facility: CLINIC | Age: 47
End: 2022-05-20

## 2022-05-20 LAB
ALBUMIN: 4.6
ALKALINE PHOSPHATASE: 220
ALT (SGPT): 35
AST (SGOT): 21
BASO (ABSOLUTE): 0.1
BASOS: 1
BILIRUBIN, DIRECT: 0.21
BILIRUBIN, TOTAL: 0.6
BUN/CREATININE RATIO: 9
BUN: 10
CARBON DIOXIDE, TOTAL: 21
CHLORIDE: 96
CREATININE: 1.15
EGFR: 59
EOS (ABSOLUTE): 0.2
EOS: 2
GLUCOSE: 97
HEMATOCRIT: 43.5
HEMATOLOGY COMMENTS:: (no result)
HEMOGLOBIN: 14.1
IMMATURE CELLS: (no result)
IMMATURE GRANS (ABS): 0.1
IMMATURE GRANULOCYTES: 1
LYMPHS (ABSOLUTE): 2.7
LYMPHS: 26
MCH: 29.7
MCHC: 32.4
MCV: 92
MONOCYTES(ABSOLUTE): 0.9
MONOCYTES: 9
NEUTROPHILS (ABSOLUTE): 6.4
NEUTROPHILS: 61
NRBC: (no result)
PLATELETS: 452
POTASSIUM: 4.7
PROTEIN, TOTAL: 7.2
RBC: 4.75
RDW: 13.9
SODIUM: 135
WBC: 10.3

## 2022-05-20 NOTE — HPI-TODAY'S VISIT:
Dear Chanda Segura, May 19 labs show glucose down to 97 from 108 before.  BUN of 10.  Creatinine still remains slightly up at 1.15 from 1.13.  He to stay hydrated with the increasing temperatures. Sodium 135 potassium 4.7 chloride 96 CO2 of 21. White blood cell count 10.3 hemoglobin 14.1 platelet count slightly up at 452 and was previously 430.  MCV 92.  Neutrophils 6.4 lymphocytes 2.7 normal. Albumin 4.6 bilirubin 0.6 which is down from 1.0 and prior 1.4.  Direct bilirubin 0.21.  Alk phos remains at 220 was previously 220 and prior 180.  AST down to 21 and previously 38.  ALT down to 35 from previously 65. What you think changed to account for the labs fluxing down? 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 If the other labs drop and the alk phos stays up fractionate it and see if coming from liver vs bone vs intestinal source.  Dr. Issa

## 2022-06-08 ENCOUNTER — LAB OUTSIDE AN ENCOUNTER (OUTPATIENT)
Dept: URBAN - METROPOLITAN AREA CLINIC 86 | Facility: CLINIC | Age: 47
End: 2022-06-08

## 2022-06-09 ENCOUNTER — WEB ENCOUNTER (OUTPATIENT)
Dept: URBAN - METROPOLITAN AREA CLINIC 86 | Facility: CLINIC | Age: 47
End: 2022-06-09

## 2022-06-09 ENCOUNTER — TELEPHONE ENCOUNTER (OUTPATIENT)
Dept: URBAN - METROPOLITAN AREA CLINIC 92 | Facility: CLINIC | Age: 47
End: 2022-06-09

## 2022-06-09 LAB
ALBUMIN: 4.5
ALKALINE PHOSPHATASE: 213
ALT (SGPT): 35
AST (SGOT): 17
BASO (ABSOLUTE): 0
BASOS: 0
BILIRUBIN, DIRECT: 0.2
BILIRUBIN, TOTAL: 0.6
BUN/CREATININE RATIO: 15
BUN: 14
CARBON DIOXIDE, TOTAL: 22
CHLORIDE: 103
CREATININE: 0.94
EGFR: 75
EOS (ABSOLUTE): 0
EOS: 0
GLUCOSE: 123
HEMATOCRIT: 37.9
HEMATOLOGY COMMENTS:: (no result)
HEMOGLOBIN: 13.2
IMMATURE CELLS: (no result)
IMMATURE GRANS (ABS): 0.1
IMMATURE GRANULOCYTES: 1
LYMPHS (ABSOLUTE): 2.1
LYMPHS: 15
MCH: 31.9
MCHC: 34.8
MCV: 92
MONOCYTES(ABSOLUTE): 0.6
MONOCYTES: 5
NEUTROPHILS (ABSOLUTE): 10.8
NEUTROPHILS: 79
NRBC: (no result)
PLATELETS: 411
POTASSIUM: 4.2
PROTEIN, TOTAL: 7.1
RBC: 4.14
RDW: 13.6
SODIUM: 138
WBC: 13.5

## 2022-06-09 NOTE — HPI-TODAY'S VISIT:
Dear Chanda Segura, June 8 labs showed glucose elevated at 123 and previously was normal but has occasionally been elevated as back in May 3 it was 108.  Please share with primary provider.  Creatinine down to 0.94 from 1.15 so doing better there.  Sodium 138 potassium 4.2 chloride 103 CO2 of 22. White cell count up at 13.5 from 10.3.  Were you ill recently?  Hemoglobin 13.2 platelet count down to normal at 411 from 452.  Neutrophils up at 10.8 and again wonder if he had a recent infection or vaccine?  Lymphocytes 2.1 normal. Albumin 4.5 bilirubin 0.6 which is stable direct bilirubin 0.2.  Alkaline phosphatase slightly lowered to 13 from 220 AST low at 17 from 21 previously 38.  ALT still slightly up at 35 but previously 35 and back on May 3 have been 65. Overall liver labs are dropping but slowing down on that. Any new meds? Maybe role of recent illness? Dr Issa

## 2022-06-27 ENCOUNTER — LAB OUTSIDE AN ENCOUNTER (OUTPATIENT)
Dept: URBAN - METROPOLITAN AREA CLINIC 86 | Facility: CLINIC | Age: 47
End: 2022-06-27

## 2022-06-28 ENCOUNTER — TELEPHONE ENCOUNTER (OUTPATIENT)
Dept: URBAN - METROPOLITAN AREA CLINIC 92 | Facility: CLINIC | Age: 47
End: 2022-06-28

## 2022-06-28 LAB
ALBUMIN: 4.1
ALKALINE PHOSPHATASE: 183
ALT (SGPT): 31
AST (SGOT): 18
BASO (ABSOLUTE): 0.1
BASOS: 1
BILIRUBIN, DIRECT: 0.3
BILIRUBIN, TOTAL: 1.1
BUN/CREATININE RATIO: 11
BUN: 10
CARBON DIOXIDE, TOTAL: 24
CHLORIDE: 103
CREATININE: 0.93
EGFR: 76
EOS (ABSOLUTE): 0.2
EOS: 2
GLUCOSE: 86
HEMATOCRIT: 39.6
HEMATOLOGY COMMENTS:: (no result)
HEMOGLOBIN: 13.2
IMMATURE CELLS: (no result)
IMMATURE GRANS (ABS): 0.1
IMMATURE GRANULOCYTES: 1
LYMPHS (ABSOLUTE): 3
LYMPHS: 27
MCH: 30.8
MCHC: 33.3
MCV: 92
MONOCYTES(ABSOLUTE): 0.9
MONOCYTES: 8
NEUTROPHILS (ABSOLUTE): 6.8
NEUTROPHILS: 61
NRBC: (no result)
PLATELETS: 363
POTASSIUM: 3.9
PROTEIN, TOTAL: 6.8
RBC: 4.29
RDW: 13.9
SODIUM: 141
WBC: 11

## 2022-06-28 NOTE — HPI-TODAY'S VISIT:
Dear Chanda Segura, June 27 labs show glucose normal at 86 BUN of 10 creatinine 0.93 down from 0.94.  Sodium 141 potassium 3.9 chloride 103 CO2 24. White blood count still up at 11 but down from 13.5 with.  Hemoglobin 13.2 which is stable.  Platelet count normal at 363 from 411.  MCV normal at 92.  Neutrophils normal at 6.8 and lymphocytes 3.0. Albumin 4.1 bilirubin 1.1 direct bilirubin 0.3.  Alk phos down to 183 from 213.  AST down to 18 and ALT down to 31 from previous AST 17 and ALT 35.  Ideal ALT is less than 25. We will continue to watch labs.  I would repeat the labs again in approximately 1 month.  Please call if you need a new order. Dr. Issa

## 2022-07-01 ENCOUNTER — LAB OUTSIDE AN ENCOUNTER (OUTPATIENT)
Dept: URBAN - METROPOLITAN AREA TELEHEALTH 2 | Facility: TELEHEALTH | Age: 47
End: 2022-07-01

## 2022-07-22 ENCOUNTER — OFFICE VISIT (OUTPATIENT)
Dept: URBAN - METROPOLITAN AREA TELEHEALTH 2 | Facility: TELEHEALTH | Age: 47
End: 2022-07-22
Payer: COMMERCIAL

## 2022-07-22 ENCOUNTER — LAB OUTSIDE AN ENCOUNTER (OUTPATIENT)
Dept: URBAN - METROPOLITAN AREA TELEHEALTH 2 | Facility: TELEHEALTH | Age: 47
End: 2022-07-22

## 2022-07-22 VITALS — BODY MASS INDEX: 24.29 KG/M2 | HEIGHT: 62 IN | WEIGHT: 132 LBS

## 2022-07-22 DIAGNOSIS — M13.80 SERONEGATIVE ARTHRITIS: ICD-10-CM

## 2022-07-22 DIAGNOSIS — D80.2 IGA DEFICIENCY: ICD-10-CM

## 2022-07-22 DIAGNOSIS — K71.9 DRUG-INDUCED LIVER DISEASE: ICD-10-CM

## 2022-07-22 DIAGNOSIS — K75.4 AUTOIMMUNE HEPATITIS: ICD-10-CM

## 2022-07-22 DIAGNOSIS — K63.5 POLYP OF COLON, UNSPECIFIED PART OF COLON, UNSPECIFIED TYPE: ICD-10-CM

## 2022-07-22 DIAGNOSIS — E66.3 OVERWEIGHT: ICD-10-CM

## 2022-07-22 DIAGNOSIS — F14.11 COCAINE ABUSE IN REMISSION: ICD-10-CM

## 2022-07-22 DIAGNOSIS — G44.009: ICD-10-CM

## 2022-07-22 PROCEDURE — 99214 OFFICE O/P EST MOD 30 MIN: CPT

## 2022-07-22 RX ORDER — TIOTROPIUM BROMIDE 18 UG/1
1 CAPSULE BY INHALING THE CONTENTS OF THE CAPSULE USING THE HANDIHALER DEVICE CAPSULE ORAL; RESPIRATORY (INHALATION) ONCE A DAY
Status: ACTIVE | COMMUNITY

## 2022-07-22 RX ORDER — ZIPRASIDONE HYDROCHLORIDE 60 MG/1
1 CAPSULE WITH FOOD CAPSULE ORAL
Status: ACTIVE | COMMUNITY

## 2022-07-22 RX ORDER — PRUCALOPRIDE 2 MG/1
1 TABLET TABLET, FILM COATED ORAL ONCE A DAY
Status: ACTIVE | COMMUNITY

## 2022-07-22 RX ORDER — PROPRANOLOL HYDROCHLORIDE 60 MG/1
1 CAPSULE CAPSULE, EXTENDED RELEASE ORAL ONCE A DAY
Status: ACTIVE | COMMUNITY

## 2022-07-22 RX ORDER — INSULIN GLARGINE 100 [IU]/ML
10 UNITS A DAY INJECTION, SOLUTION SUBCUTANEOUS
Status: ACTIVE | COMMUNITY

## 2022-07-22 RX ORDER — SPIRONOLACTONE 100 MG/1
1 TABLET TABLET, FILM COATED ORAL ONCE A DAY
Status: ACTIVE | COMMUNITY

## 2022-07-22 RX ORDER — PREDNISONE 2.5 MG/1
1 TABLET TABLET ORAL ONCE A DAY
Status: ACTIVE | COMMUNITY

## 2022-07-22 RX ORDER — URSODIOL 300 MG/1
1 CAPSULE CAPSULE ORAL
Qty: 270 | Refills: 1 | Status: ACTIVE | COMMUNITY

## 2022-07-22 RX ORDER — CITALOPRAM HYDROBROMIDE 10 MG/1
1 TABLET TABLET, FILM COATED ORAL ONCE A DAY
Status: ACTIVE | COMMUNITY

## 2022-07-22 RX ORDER — URSODIOL 300 MG/1
1 CAPSULE CAPSULE ORAL TWICE A DAY
Qty: 60 | Refills: 2

## 2022-07-22 RX ORDER — DOXEPIN HYDROCHLORIDE 10 MG/1
3 CAPSULE AT BEDTIME CAPSULE ORAL ONCE A DAY
Status: ACTIVE | COMMUNITY

## 2022-07-22 RX ORDER — LANSOPRAZOLE 30 MG/1
1 CAPSULE BEFORE A MEAL CAPSULE, DELAYED RELEASE ORAL ONCE A DAY
Status: ACTIVE | COMMUNITY

## 2022-07-22 RX ORDER — PROMETHAZINE HYDROCHLORIDE 25 MG/1
1 TABLET AS NEEDED TABLET ORAL
Status: ACTIVE | COMMUNITY

## 2022-07-22 RX ORDER — FUROSEMIDE 40 MG/1
1 TABLET TABLET ORAL
Status: ACTIVE | COMMUNITY

## 2022-07-22 RX ORDER — ATORVASTATIN CALCIUM 40 MG/1
1 TABLET TABLET, FILM COATED ORAL ONCE A DAY
Status: ACTIVE | COMMUNITY

## 2022-07-22 RX ORDER — ERGOCALCIFEROL 1.25 MG/1
1 CAPSULE CAPSULE, LIQUID FILLED ORAL
Status: ACTIVE | COMMUNITY

## 2022-07-22 NOTE — HPI-TODAY'S VISIT:
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  Pt says labs in Jan 2022 were up and she was ordered a medicine that had diclofenac and Geodon in Jan. She is stil on the geodon at 40mg.  June 27 labs show glucose normal at 86 BUN of 10 creatinine 0.93 down from 0.94.  Sodium 141 potassium 3.9 chloride 103 CO2 24. White blood count still up at 11 but down from 13.5 with.  Hemoglobin 13.2 which is stable.  Platelet count normal at 363 from 411.  MCV normal at 92.  Neutrophils normal at 6.8 and lymphocytes 3.0. Albumin 4.1 bilirubin 1.1 direct bilirubin 0.3.  Alk phos down to 183 from 213.  AST down to 18 and ALT down to 31 from previous AST 17 and ALT 35.  Ideal ALT is less than 25.  She has not done the redo in 1m.   June 8 labs showed glucose elevated at 123 and previously was normal but has occasionally been elevated as back in May 3 it was 108.  Please share with primary provider.  Creatinine down to 0.94 from 1.15 so doing better there.  Sodium 138 potassium 4.2 chloride 103 CO2 of 22. White blood  cell count up at 13.5 from 10.3.  Were you ill recently?  Hemoglobin 13.2 platelet count down to normal at 411 from 452.  Neutrophils up at 10.8 and again wonder if he had a recent infection or vaccine?  Lymphocytes 2.1 normal. Albumin 4.5 bilirubin 0.6 which is stable direct bilirubin 0.2.  Alkaline phosphatase slightly lowered to 13 from 220 AST low at 17 from 21 previously 38.  ALT still slightly up at 35 but previously 35 and back on May 3 have been 65. Overall liver labs are dropping but slowing down on that. Any new meds? Maybe role of recent illness?  May 19 labs show glucose down to 97 from 108 before.  BUN of 10.  Creatinine still remains slightly up at 1.15 from 1.13.  He to stay hydrated with the increasing temperatures. Sodium 135 potassium 4.7 chloride 96 CO2 of 21. White blood cell count 10.3 hemoglobin 14.1 platelet count slightly up at 452 and was previously 430.  MCV 92.  Neutrophils 6.4 lymphocytes 2.7 normal. Albumin 4.6 bilirubin 0.6 which is down from 1.0 and prior 1.4.  Direct bilirubin 0.21.  Alk phos remains at 220 was previously 220 and prior 180.  AST down to 21 and previously 38.  ALT down to 35 from previously 65.  May 3 labs show glucose slightly up at 108 and may be not fasting that day?  BUN of 12 creatinine elevated at 1.13 previously 1.06.  Sodium 139 potassium 4.5 chloride 101 CO2 21.  The chloride and CO2 are back to normal which they were off last time. White blood cell count down to 10 hemoglobin 14.8 platelet count 430 MCV 90 neutrophils normal at 6.5 lymphocytes normal now at 2.5. Albumin 4.7 also down from 5.0.  Bilirubin normal now at 1.0 alkaline phosphatase still elevated though at 220 previously was 180.  AST was 38 and ALT 65.  Previously AST 27 and ALT 44. Called pt and no new meds. Did drop citalopram to 10mg po qd and she is now off. Soumya 300mg po tid for 2 weeks or so. Asked if gaining weight and she is not. Smoking a lot of cigarettes and I have seen that before but not common to see. She will work on this and see how she does. She will try to work on that. Redo the labs in 2 weeks.   She stopped her lisinopril and changed to amlodipine and did not start as bp is low. 95/71.  Due for an u.s and do locally.  January 19, 2022 ultrasound sent in. Liver appeared to be increased abd coarsened in regards to echogenicity.  No definite liver lesions.  No dilated ducts. Normal hepatopetal flow seen in the main portal vein.  Imaged hepatic veins also were patent. Common bile duct normal at 5 mm. Gallbladder absent. Right kidney 10.6 cm with no hydronephrosis. Left kidney 10.5 cm with no hydronephrosis.  Spleen normal at 8.7 cm. They suspected that the liver could have moderate diffuse fatty infiltration.  I would state that this was also at the time that you are having a medication effect so it could be that threw that reading off.     April 4, 2022 labs show glucose 93 which is normal BUN of 9 creatinine slightly up at 1.05 and was previously 1.01. May want to look at your hydrational state. Sodium 139 potassium 4.7 chloride 99 CO2 of 22. Albumin 4.8 bilirubin 1.2 which is normal and direct bilirubin 0.28 suggesting that you have Gilbert's syndrome or that indirect hyperbilirubinemia that is benign and present about 10% of people. Alkaline phosphatase down to 187 from 202 and down previously from 230.  AST of 24 down from 36 and prior 49. ALT 33 down from 41 and ALT 76.  She had a prior bump in lfts from working on epoxys.  Making cups.  She also asked re asa and crestor and  she has not started the aspirin yet and she is to start to crestor and she will start post the next labs.  March 21 labs show glucose still slightly up at 104 previously 106.  BUN of 7 and creatinine down to 1.01 from 1.19 so that was better.  Sodium 136 potassium 4.2 chloride 101 CO2 slightly low at 18. Total bilirubin normal at 1.1 previously 1.2 direct bilirubin 0.29 and I would suggest that you have Gilbert's.  Gilbert's as you recall is that indirect hyperbilirubinemia that is very common and present about 10% of people. The alkaline phosphatase is down to 202 from 230 and the AST is down to 36 from 49 and ALT 41 from 76.  It would appear that you change something? White blood cell count 9.7 hemoglobin 14.1 platelet count 444 down from 499. Important to note the white cell count also came down from 13.4 to 9.7.  Neutrophils 6.8 and lymphocytes 2.1 both normal. Called pt re the labs. She says did not change meds. 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   March 9 labs show white blood cell count 13.4 up from 11.7 and prior 10.7.  Hemoglobin 14.6.  Platelet count up a little at 499 from 445.  It has been variable before though.  MCV normal at 90.  Neutrophils up at 9.5 and lymphocytes 2.5 down from last time. Glucose 106 slightly up and BUN of 9 creatinine slightly higher at 1.9 and previously 1.15.   Sodium 137 potassium 4.1 chloride 100 CO2 slightly low at 19.  Albumin 4.0.  Total bilirubin 1.2 normal.  Direct bilirubin 0.35.  Alkaline phosphatase up to 233.  AST 49 and ALT 76 and previously AST 29 ALT 40. Called pt as labs trending up. Meds: propanol, furosemide, spirinolactone, basagalar, Spiriva and Symbicort, Geodon and lamotrigine. Ursodiol. Lamotrigine 1% risk for incl fts and on for a year. Geodon: category c for liver injury and occurs within 1-4 weeks and she has been on it for 6-8 weeks. She will discuss with psych re this.   March 3 labs show liver alkaline phosphatase fraction is 76% bone fraction is 24% and intestinal 0%  so clearly the alkaline phosphatase is coming mainly from liver.  This is the normal pattern that one would expect. March 3 labs also show albumin slightly up at 4.9 from 4.7.  Could reflect hydrational status. Total bilirubin normal at 1.2 and direct bilirubin 0.32 suggesting a Gilbert's syndrome which is a benign finding. Alkaline phosphatase remains elevated at 203 and previously was 175.  AST 29 and ALT 40 and previously AST 20 and ALT 34. Unfortunately these labs arrived very late so we need to go over with you what medicines you are on and since when.  They are not dramatically worse but they are definitely drifting up.  February 24 labs show white blood cell count elevated at 11.7 previously was 10.7.  Hemoglobin 14.5 normal platelet count 445 down from 468.  MCV 92.  Neutrophils elevated at 7.1 and lymphocytes 3.4.  Have you been ill?  Glucose 107 BUN of 12 creatinine 1.15 down from 1.26.  Normal is from 0.57 up to 1.0 so still elevated.  Sodium 137 k 3.8 chloride 98 CO2 of 22.  Albumin 4.7 normal now.  Limit 0.8 direct bilirubin 0.18 and alkaline phosphatase elevated at 175.  Previously 107.  AST 20 and ALT 34.  Previously AST 26 and ALT 30.  Need to watch this evolving alkaline phosphatase trend. Called pt: she had surgery on the right elbow and so this could be from bone and had moved ulnar nerve. She also is on some prednisone and she is on prednisone 2.5mg po qd. We need to fractionate the alkaline phosphatase. We can redo the labs next week to check on that.  February 2 labs show white blood cell count 10.7 hemoglobin 14.4 platelet count up at 468.  MCV was 90 which is normal.  Neutrophils 6.3 and lymphocytes elevated at 3.3. Glucose slightly up at 102 BUN of 13 creatinine elevated at 1.26 sodium 139 potassium 3.9 chloride 99 CO2 of 22 albumin elevated at 4.9 bilirubin 0.8 direct bilirubin 0.21 alkaline phosphatase 107 AST 26 and ALT 30 so clearly that must have been that herbal smooth product that you were on and the other herbals and we can reassess from there. Called pt: she had ketorolac also. Start the ursodiol 300mg po bid with food. She will do that and follow labs.  Started atorvastatin since dec 2020 and she is still on that for now and then to do the crestor,  January 4 local labs sent in. Glucose 159 elevated please share with primary provider.  He had a 16 creatinine 0.96 sodium 138 potassium 4.2 chloride 99 calcium 9.9 total bili 0.4 alkaline phosphatase 202 and . Prior December 29 labs showed alk phos 114 AST 28 and ALT 45 so clearly those numbers bumped. White blood cell count 8.4 hemoglobin 13.2 platelet count 350. Cholesterol 226 elevated.  Triglyceride 284 elevated.   elevated.  Hemoglobin A1c slightly better at 7.9 from 8.1%.  TSH 1.78.  Magnesium 2.2. Called pt: need new labs to see what labs doing.   They did an u.s locally also and she says was  told fatty liver.  Dec 29 labs: ast 48 and alt 31 and alk 111 and tb 0.4.   December 13 2021  labs show white blood  cell count elevated 11.9 but down from 12.6.  Hemoglobin 15 and platelets up at 503 and previously 394.  That can sometimes go up with inflammation or medicines.  Please be sure to share this info with primary providers. MCV normal at 90.  Neutrophils elevated 7.4 but down from 10.1 back on December 2.  Lymphocytes normal at 3.1 and monocytes slightly up at 1.0. Glucose elevated at 149 and was previously 114.  Hemoglobin A1c up to 8.1 and was previously 6.3.  You should look at this sugar issue again with your primary provider as that is unusual for you. BUN of 12 creatinine 1.19 which is up from 1.014 sodium 136 potassium 4.2 chloride 98 CO2 20. Calcium at 10.4 and was previously normal at 9.6 and I wonder if it is medicine related? Albumin 4.6 bilirubin 0.8 alkaline phosphatase slightly higher at 130 previously 126. AST down to 32 and ALT down to 49 previously AST 65 and ALT 66. C-reactive protein noted to be normal at 2.  Dec 1 labs much better: Tp 6.9 and alb 4.2 and tb 0.2 and db 0.10 and alk 111 and ast 19 and alt 43 and down from prior alk 301 and ast 77 and alt 171. So doing much better. Keep track of any new meds added or removed.  November 98 labs show 123 which is elevated BUN of 18 creatinine 0.99 sodium 137 potassium 5.1 chloride 99 CO2 of 24 albumin 4.7 bilirubin 0.5 alkaline phosphatase down to 118 from 194.  AST 29 and previously 21 ALT down to 46 from 53.  So clearly centimeters drop. She says she had increased the Seroquel and then being weaned on it now. She is now on 50 mg of Seroquel and she is to stop it tonight and she will redo labs in 1-2 weeks to be sure.  Ca 10.3 little up and she is not on supplements for this and follow course. She will let primary md know.  Oct 12: alb 4.5 and tb 0.6 and db 0.19 and alk 301 and ast 77 and alt 171 and prior ast 33 and alt 52. went to 25 and 50 and now 100mg.  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 Says taking dilaudid and ibuprofen for that. She says taking a dilaudid occ and one ibuprofen. 10-1 labs also off. Still on steroids 10mg po qd for the rheum arthritis so enbrel stopped working and went to this. She did myasthenia screen and neg and started on this and trying to do humira sunday.   10-1 alk 199 and ast 56 and alt 149.   September 21 ultrasound showed the liver to be 15.6 cm with mild hepatic steatosis but no focal mass. Liver vessels are noted to be patent. Prior cholecystectomy changes seen. No bile duct dilation seen with common bile duct 5 mm. Visualized pancreas portions were unremarkable proportions of the head and tail the pancreas were not seen due to gas. Right kidney 10.6 cm and left kidney 10.8 cm with no hydronephrosis. Spleen normal at 8.5 cm. Mild liver fat overall was seen patent vessels were seen. No ascites was seen.  September 21 labs show albumin 4.9 which remains slightly up bilirubin 0.4 which is down from 0.8 direct bilirubin 0.13 which is down from 0.23. Alkaline phosphatase down to 198 from 207 AST slightly up at 33 from 28 ALT 52 down from 66.  September 9 redo labs show albumin stable 4.9 bilirubin 0.8 direct 0.23 alkaline phosphatase slightly lower at 207 from 215. AST down to 28 from 61. ALT down to 66 from 86.  So it does appear the labs are dropping with lamictal drop 150 to 100mg and then went up on seroquel. Sept 7 labs: alb 4.8 and tb 0.7 and db 0.2 and alk 215 and Ast 61 and alt 86.  Prior aug 27 albs ast 18 and alt 28 and alk 135. that was pre lamictal changed.  She had prior drinking green tea for a few weeks. She has an immune issue and so the labs spiked up. then stopped and labs settled last year off that.  Pt was to do a bx and trouble breathing and Dr Self told to do barium swallow and manometry and did the barium and to do the manometry and says told was ok.  Ent saw some plaques and she had bx done.  Pelvic area pain noted and se started with pain and went to er and went [facility] to [facility] and she said cyst in fallopian tube and ruptured and did a full hysterectomy and that was July 28 2021.  Ex  burned her citizenship certificate. She said she applied to get it. 555.00 for that.  Enbrel started earlier for seronegative RA.  She says needed onc re wbc up and asked him to do the lfts and she says part of labs back and she has from aug 8 2021. Ast and alt done and alk 209 and prior 138 and ast 149 and alt prior 34 and alt 231 and 76 july 19. b12 1804 and IGA 68 little low.  May 3:wbc 9.6 hg 13.7 and plat 355 and mcv 93. Neutrophils 6.5. glu 92 and cr 0.95 and na 137 and k 4.3 and ca 9.8 and alb 4.5 and tb 0.3 and alk 177 elevated and ast 30 and alt 70 and tesosterone level 6 normal per chart for your age. Prior alk 277 and ast 126 and alt 315. So came down post that doxycycline exposure.  April 6, 2021 [facility]s at [facility]: pancreas appears normal where seen and liver demonstrates normal size and contour and echogenicity. Liver vessels patent. Gallbladder absent. Common bile duct 4mm. No hydronephrosis seen to kidneys. Spleen 8.4cm. No liver mass seen.  Jan 11: glu 171 elevated and show local md. cr 1.06 and na 137 and k 4.0 and cl 98 and co2 21 and ca 9.7 normal and alb 4.6 and tb 0.7 and alk 88 and ast 13 and alt 16. So liver labs doing very well. A1c 5.9% noted.  Jan 20 2021: ct no cardiomegaly. Clear lung bases. Unremarkable liver. Prior gb surgery changes. Unremarkable spleen. Pancreas and and adrenal glands normal. cyst right kidney. left renal cyst. Mild rectosigmoid wall thickening. Liver vessels patent. No bulk adenopath.  Nov 9 2020 labs: wbc 13. 4hg 14 and plat 453 elevated slightly (150- 450 normal). na 135 k 4.6 and cl 97 and co2 26 and calcium elevated 11.0.  Show to local md: why is calcium up?alb 4.5 and tb 0.6 and alk 165 elevated and ast 22 and alt 36 so back down. chol 336 elevated and trg 307 and ldl 219. Lipids off and not sure if due to recent liver flare or other and may want to redo that as known if liver flared that this can be off. Vit d low 28.8. a1c 5.9%.  She says has had low vit d and she was to start this and she was started on 5000 a day for 4 m and she had been taking some vit d. She says she had stopped it for surgery July and restarted it again.  She says was on doxycycline and said had a prior hx of tcn reaction and filled and she said she may have been more ill from it. No reexposure.  Nov 3 2020 : ast 50 and alt 121 (both down then from 121 and 222). main she is off green tea and the lamictal also off prior. Suspect green tea was immune stimulant reving up your immune system. She has a very active immune system.  Rheum says she has fibromyalgia and doing water therapy.  11-2-20: ast down to 50 and alt 118 and prior 121 and alt 222 . alk 213 still up from 210 and tb down to 0.4 from 0.5.  Oct 26 labs tb 0.5 and alk 210 and ast 121 and alt 222. alk 248 and so now 210 so that is better, and ast 128 and that is now 121 and alt was 248 and now 222 so better and we need to follow.  Oct 19 labs spiked again: alk 198 and ast 128 and alt 248. Tb 0.5. na 136 and k k 4.5. bun 15 and cr 0.94 and glu 112.   Oct 6 ast 16 and alt 25 and alk 111. Prior she was also on abx for 6 weeks and so finished oct 8 or so and so that not the cause.  Oct 6: wbc 11.2 little up, hg 14.4 and hct 42.2, plat 141. Mcv 90, glu 103 little up and maybe not fasting. Bun 20 and cr 0.98 and na 135 and k 4.5, cl 99 and co2 22. Alb 4.2 and tb 0.5 and alk 111 and ast 16 and alt 25.  Prior visit lamictal came down from 75/50 to 50/25 as of oct 9 and added gabapentin 400mg and seroquel 25mg po qhs an atorvastatin 40mg po qhs.  Off the prior nasal exposures also.  Aunt Ewelina passed in oct 2020 had oltx and she had failed from 2nd tx. Not from covid 19.  U.s aug 2020 [facility]: they saw mild fatty infiltration of the liver. Prior gb surgery changes seen. Visualized pancreas portions normal. No liver masses. Common duct 4mm. Portal vein patent. Kidneys unremarkable. Spleen 7.8cm unremarkable. Should get better as time passes from the issues we discussed.  Aug 4 2020 labs: glu 61 and little low bun 17 and cr 0.91 and na 135 and k 5.2 and cl 96 and co2 20 and ca 10.9 elevated and show local md. alb 4.7 and tb 0.6 and alk 104 and ast 24 and alt 28.  She was on ursodiol for has been on it for her liver started july 29 and she did stop it . She is staying off this as last time ok and may need it.  Nov 25 2020: wbc 11.3 and hg 14.1 plat 484 elevated and neutrophils 7.7 and glu 144 elevated and cr 1.08 little up.na 139 and k 4.3 and cl 99 and co2 24 and ca 10 and alb 4,7 and tb 0.3 and alk 130 and ast 15 and alt 18. Addendum: 1. Smoothie immune booster did. 2. Michelle took that. 3. She will confirm medicine.  Has lost 53 pounds as of july 2022  Plan: 1.  Need the u.s  to check on the liver. 2.  She remains on disability. 3.  She will do labs in 3-4 w and 7-8w and 12 weeks. 4.  She will see us in 3m   Stressed to pt the need for social distancing and strict handwashing and wearing a mask and to follow any other new or added CDC recommendations as this is an evolving target.   Duration of visit was 32 min with 10 m of prep and then 22 min by healmariia with greater than 50% of time spent reviewing chart and events with the patient and in discussing plans.

## 2022-07-29 ENCOUNTER — LAB OUTSIDE AN ENCOUNTER (OUTPATIENT)
Dept: URBAN - METROPOLITAN AREA TELEHEALTH 2 | Facility: TELEHEALTH | Age: 47
End: 2022-07-29

## 2022-08-05 ENCOUNTER — TELEPHONE ENCOUNTER (OUTPATIENT)
Dept: URBAN - METROPOLITAN AREA CLINIC 92 | Facility: CLINIC | Age: 47
End: 2022-08-05

## 2022-08-05 ENCOUNTER — LAB OUTSIDE AN ENCOUNTER (OUTPATIENT)
Dept: URBAN - METROPOLITAN AREA CLINIC 86 | Facility: CLINIC | Age: 47
End: 2022-08-05

## 2022-08-05 ENCOUNTER — OFFICE VISIT (OUTPATIENT)
Dept: URBAN - METROPOLITAN AREA CLINIC 22 | Facility: CLINIC | Age: 47
End: 2022-08-05
Payer: COMMERCIAL

## 2022-08-05 DIAGNOSIS — K75.4 AUTOIMMUNE HEPATITIS: ICD-10-CM

## 2022-08-05 PROCEDURE — 93975 VASCULAR STUDY: CPT

## 2022-08-05 PROCEDURE — 76705 ECHO EXAM OF ABDOMEN: CPT

## 2022-08-05 NOTE — HPI-TODAY'S VISIT:
More Segura, Aug 5  ultrasound shows liver normal in contour and echogenicity.   No suspicious liver lesions. Common bile duct was normal at 2.4 mm and gallbladder absent. Right kidney 9.6 cm with no hydronephrosis. Spleen normal at 8.6 cm. Imaged pancreas portions unremarkable with tail partially not well seen due to gas. Liver vessels patent. Overall good to see that there was no focal lesions and no suspicion for any fibrotic morphology. Dr. Issa

## 2022-08-06 ENCOUNTER — TELEPHONE ENCOUNTER (OUTPATIENT)
Dept: URBAN - METROPOLITAN AREA CLINIC 92 | Facility: CLINIC | Age: 47
End: 2022-08-06

## 2022-08-06 LAB
ALBUMIN: 4.4
ALKALINE PHOSPHATASE: 150
ALT (SGPT): 23
AST (SGOT): 27
BASO (ABSOLUTE): 0.1
BASOS: 1
BILIRUBIN, DIRECT: 0.22
BILIRUBIN, TOTAL: 0.8
BUN/CREATININE RATIO: 7
BUN: 6
CARBON DIOXIDE, TOTAL: 23
CHLORIDE: 102
CREATININE: 0.92
EGFR: 77
EOS (ABSOLUTE): 0.2
EOS: 2
GLUCOSE: 93
HEMATOCRIT: 44.3
HEMATOLOGY COMMENTS:: (no result)
HEMOGLOBIN: 14.6
IMMATURE CELLS: (no result)
IMMATURE GRANS (ABS): 0
IMMATURE GRANULOCYTES: 0
LYMPHS (ABSOLUTE): 2.1
LYMPHS: 20
MCH: 31.2
MCHC: 33
MCV: 95
MONOCYTES(ABSOLUTE): 0.7
MONOCYTES: 7
NEUTROPHILS (ABSOLUTE): 7.3
NEUTROPHILS: 70
NRBC: (no result)
PLATELETS: 404
POTASSIUM: 4.3
PROTEIN, TOTAL: 6.8
RBC: 4.68
RDW: 13.2
SODIUM: 141
WBC: 10.4

## 2022-08-06 NOTE — HPI-TODAY'S VISIT:
Dear Chanda Segura, Aug 5: glu 93 and bun 6 and cr 0.92 and na 141 and k 4.3 and cl 102 and co2 23. Wbc 10.4 and hg 14.6 and hct 44.3 and plat 404.  Mcv 95. Neutrophils 7.3 little up and prior normal 6.8. Lymphs 2.1. Alb 4.4 and tb 0.8 and db 0.22 and alk 150 ast 27 and alt 23. Prior June 27: alk 183 and ast 18 and alt 31. So labs trending right way with some variation in ast and alt but still in normal range for ast and alt. No clear acute issues. Dr Issa

## 2022-08-29 ENCOUNTER — LAB OUTSIDE AN ENCOUNTER (OUTPATIENT)
Dept: URBAN - METROPOLITAN AREA TELEHEALTH 2 | Facility: TELEHEALTH | Age: 47
End: 2022-08-29

## 2022-09-19 ENCOUNTER — LAB OUTSIDE AN ENCOUNTER (OUTPATIENT)
Dept: URBAN - METROPOLITAN AREA TELEHEALTH 2 | Facility: TELEHEALTH | Age: 47
End: 2022-09-19

## 2022-09-27 ENCOUNTER — TELEPHONE ENCOUNTER (OUTPATIENT)
Dept: URBAN - METROPOLITAN AREA CLINIC 86 | Facility: CLINIC | Age: 47
End: 2022-09-27

## 2022-09-27 NOTE — HPI-TODAY'S VISIT:
Dear Chanda Segura,   9/14/22 labs were sent to us.  White blood cell count 10.8, hemoglobin 13.2, MCV 95, platelets 337, lymphocytes 2.3, neutrophils 7.3 which is slightly elevated please make sure your primary care doctor is aware.  Glucose 98, creatinine 0.65, sodium 142, potassium 4.1, bilirubin 0.5, alkaline phosphatase 146, AST 17, ALT 35. The alkaline phosphatase and the AST are trending in the right direction. Slight increase in the alt, previousl it was 31. We will continue to monitor this. Also of note the protein was slightly low at 5.9 , please share with your primary care.  Thank you, Payton Gonzalez PA-C

## 2022-10-10 ENCOUNTER — LAB OUTSIDE AN ENCOUNTER (OUTPATIENT)
Dept: URBAN - METROPOLITAN AREA TELEHEALTH 2 | Facility: TELEHEALTH | Age: 47
End: 2022-10-10

## 2022-11-02 ENCOUNTER — LAB OUTSIDE AN ENCOUNTER (OUTPATIENT)
Dept: URBAN - METROPOLITAN AREA TELEHEALTH 2 | Facility: TELEHEALTH | Age: 47
End: 2022-11-02

## 2022-11-02 ENCOUNTER — OFFICE VISIT (OUTPATIENT)
Dept: URBAN - METROPOLITAN AREA TELEHEALTH 2 | Facility: TELEHEALTH | Age: 47
End: 2022-11-02
Payer: COMMERCIAL

## 2022-11-02 VITALS — WEIGHT: 132 LBS | BODY MASS INDEX: 24.29 KG/M2 | HEIGHT: 62 IN

## 2022-11-02 DIAGNOSIS — K71.9 DRUG-INDUCED LIVER DISEASE: ICD-10-CM

## 2022-11-02 DIAGNOSIS — G44.009: ICD-10-CM

## 2022-11-02 DIAGNOSIS — F31.30 BIPOLAR AFFECTIVE DISORDER, CURRENT EPISODE DEPRESSED, CURRENT EPISODE SEVERITY UNSPECIFIED: ICD-10-CM

## 2022-11-02 DIAGNOSIS — Z79.899 HIGH RISK MEDICATION USE: ICD-10-CM

## 2022-11-02 DIAGNOSIS — D80.2 IGA DEFICIENCY: ICD-10-CM

## 2022-11-02 DIAGNOSIS — E66.3 OVERWEIGHT: ICD-10-CM

## 2022-11-02 DIAGNOSIS — Z71.89 VACCINE COUNSELING: ICD-10-CM

## 2022-11-02 DIAGNOSIS — Z98.890 HISTORY OF FUNDOPLICATION: ICD-10-CM

## 2022-11-02 DIAGNOSIS — Z71.6 TOBACCO ABUSE COUNSELING: ICD-10-CM

## 2022-11-02 DIAGNOSIS — F10.11 ALCOHOL USE DISORDER, MILD, IN EARLY REMISSION, ABUSE: ICD-10-CM

## 2022-11-02 DIAGNOSIS — R74.8 ABNORMAL LIVER ENZYMES: ICD-10-CM

## 2022-11-02 DIAGNOSIS — K75.4 AUTOIMMUNE HEPATITIS: ICD-10-CM

## 2022-11-02 PROCEDURE — 99214 OFFICE O/P EST MOD 30 MIN: CPT

## 2022-11-02 RX ORDER — CITALOPRAM HYDROBROMIDE 10 MG/1
1 TABLET TABLET, FILM COATED ORAL ONCE A DAY
Status: DISCONTINUED | COMMUNITY

## 2022-11-02 RX ORDER — DOXEPIN HYDROCHLORIDE 10 MG/1
3 CAPSULE AT BEDTIME CAPSULE ORAL ONCE A DAY
Status: ON HOLD | COMMUNITY

## 2022-11-02 RX ORDER — TIOTROPIUM BROMIDE 18 UG/1
1 CAPSULE BY INHALING THE CONTENTS OF THE CAPSULE USING THE HANDIHALER DEVICE CAPSULE ORAL; RESPIRATORY (INHALATION) ONCE A DAY
Status: DISCONTINUED | COMMUNITY

## 2022-11-02 RX ORDER — ATORVASTATIN CALCIUM 40 MG/1
1 TABLET TABLET, FILM COATED ORAL ONCE A DAY
Status: ON HOLD | COMMUNITY

## 2022-11-02 RX ORDER — FUROSEMIDE 40 MG/1
1 TABLET TABLET ORAL
Status: ON HOLD | COMMUNITY

## 2022-11-02 RX ORDER — URSODIOL 300 MG/1
1 CAPSULE CAPSULE ORAL TWICE A DAY
Qty: 60 | Refills: 2 | Status: ACTIVE | COMMUNITY

## 2022-11-02 RX ORDER — LANSOPRAZOLE 30 MG/1
1 CAPSULE BEFORE A MEAL CAPSULE, DELAYED RELEASE ORAL ONCE A DAY
Status: ACTIVE | COMMUNITY

## 2022-11-02 RX ORDER — PRUCALOPRIDE 2 MG/1
1 TABLET TABLET, FILM COATED ORAL ONCE A DAY
Status: ACTIVE | COMMUNITY

## 2022-11-02 RX ORDER — PREDNISONE 2.5 MG/1
1 TABLET TABLET ORAL ONCE A DAY
Status: ON HOLD | COMMUNITY

## 2022-11-02 RX ORDER — BUDESONIDE, GLYCOPYRROLATE, AND FORMOTEROL FUMARATE 160; 9; 4.8 UG/1; UG/1; UG/1
2 PUFFS AEROSOL, METERED RESPIRATORY (INHALATION) TWICE A DAY
Status: ACTIVE | COMMUNITY

## 2022-11-02 RX ORDER — SPIRONOLACTONE 100 MG/1
1 TABLET TABLET, FILM COATED ORAL ONCE A DAY
Status: ON HOLD | COMMUNITY

## 2022-11-02 RX ORDER — PROPRANOLOL HYDROCHLORIDE 60 MG/1
1 CAPSULE CAPSULE, EXTENDED RELEASE ORAL ONCE A DAY
Status: ACTIVE | COMMUNITY

## 2022-11-02 RX ORDER — PROMETHAZINE HYDROCHLORIDE 25 MG/1
1 TABLET AS NEEDED TABLET ORAL
Status: ACTIVE | COMMUNITY

## 2022-11-02 RX ORDER — ERGOCALCIFEROL 1.25 MG/1
1 CAPSULE CAPSULE, LIQUID FILLED ORAL
Status: ACTIVE | COMMUNITY

## 2022-11-02 RX ORDER — URSODIOL 300 MG/1
1 CAPSULE CAPSULE ORAL
Qty: 270 | Refills: 1

## 2022-11-02 RX ORDER — ZIPRASIDONE HYDROCHLORIDE 80 MG/1
1 CAPSULE WITH FOOD CAPSULE ORAL
Status: ACTIVE | COMMUNITY

## 2022-11-02 RX ORDER — INSULIN GLARGINE 100 [IU]/ML
10 UNITS A DAY INJECTION, SOLUTION SUBCUTANEOUS
Status: ON HOLD | COMMUNITY

## 2022-11-02 NOTE — HPI-TODAY'S VISIT:
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  Mother fell and dislocated shoulder and may need surgery.  No recent labs done.  9/14/22 labs were sent to us. White blood cell count 10.8, hemoglobin 13.2, MCV 95, platelets 337, lymphocytes 2.3, neutrophils 7.3 which is slightly elevated please make sure your primary care doctor is aware. Glucose 98, creatinine 0.65, sodium 142, potassium 4.1, bilirubin 0.5, alkaline phosphatase 146, AST 17, ALT 35. The alkaline phosphatase and the AST are trending in the right direction. Slight increase in the alt, previousl it was 31. We will continue to monitor this. Also of note the protein was slightly low at 5.9 , please share with your primary care.  Aug 5: glu 93 and bun 6 and cr 0.92 and na 141 and k 4.3 and cl 102 and co2 23. Wbc 10.4 and hg 14.6 and hct 44.3 and plat 404.  Mcv 95. Neutrophils 7.3 little up and prior normal 6.8. Lymphs 2.1. Alb 4.4 and tb 0.8 and db 0.22 and alk 150 ast 27 and alt 23.  Prior June 27: alk 183 and ast 18 and alt 31. So labs trending right way with some variation in ast and alt but still in normal range for ast and alt. No clear acute issues.  Aug 5  ultrasound shows liver normal in contour and echogenicity.   No suspicious liver lesions. Common bile duct was normal at 2.4 mm and gallbladder absent. Right kidney 9.6 cm with no hydronephrosis. Spleen normal at 8.6 cm. Imaged pancreas portions unremarkable with tail partially not well seen due to gas. Liver vessels patent. Overall good to see that there was no focal lesions and no suspicion for any fibrotic morphology.  Addkaycee is a rare ause of liver injury and need to watch labs on it.  Pt says labs in Jan 2022 were up and she was ordered a medicine that had diclofenac and Geodon in Jan. She is stil on the geodon at 40mg.  June 27 labs show glucose normal at 86 BUN of 10 creatinine 0.93 down from 0.94.  Sodium 141 potassium 3.9 chloride 103 CO2 24. White blood count still up at 11 but down from 13.5 with.  Hemoglobin 13.2 which is stable.  Platelet count normal at 363 from 411.  MCV normal at 92.  Neutrophils normal at 6.8 and lymphocytes 3.0. Albumin 4.1 bilirubin 1.1 direct bilirubin 0.3.  Alk phos down to 183 from 213.  AST down to 18 and ALT down to 31 from previous AST 17 and ALT 35.  Ideal ALT is less than 25.   June 8 labs showed glucose elevated at 123 and previously was normal but has occasionally been elevated as back in May 3 it was 108.  Please share with primary provider.  Creatinine down to 0.94 from 1.15 so doing better there.  Sodium 138 potassium 4.2 chloride 103 CO2 of 22. White blood  cell count up at 13.5 from 10.3.  Were you ill recently?  Hemoglobin 13.2 platelet count down to normal at 411 from 452.  Neutrophils up at 10.8 and again wonder if he had a recent infection or vaccine?  Lymphocytes 2.1 normal. Albumin 4.5 bilirubin 0.6 which is stable direct bilirubin 0.2.  Alkaline phosphatase slightly lowered to 13 from 220 AST low at 17 from 21 previously 38.  ALT still slightly up at 35 but previously 35 and back on May 3 have been 65. Overall liver labs are dropping but slowing down on that. Any new meds? Maybe role of recent illness?  May 19 labs show glucose down to 97 from 108 before.  BUN of 10.  Creatinine still remains slightly up at 1.15 from 1.13.  He to stay hydrated with the increasing temperatures. Sodium 135 potassium 4.7 chloride 96 CO2 of 21. White blood cell count 10.3 hemoglobin 14.1 platelet count slightly up at 452 and was previously 430.  MCV 92.  Neutrophils 6.4 lymphocytes 2.7 normal. Albumin 4.6 bilirubin 0.6 which is down from 1.0 and prior 1.4.  Direct bilirubin 0.21.  Alk phos remains at 220 was previously 220 and prior 180.  AST down to 21 and previously 38.  ALT down to 35 from previously 65.  May 3 labs show glucose slightly up at 108 and may be not fasting that day?  BUN of 12 creatinine elevated at 1.13 previously 1.06.  Sodium 139 potassium 4.5 chloride 101 CO2 21.  The chloride and CO2 are back to normal which they were off last time. White blood cell count down to 10 hemoglobin 14.8 platelet count 430 MCV 90 neutrophils normal at 6.5 lymphocytes normal now at 2.5. Albumin 4.7 also down from 5.0.  Bilirubin normal now at 1.0 alkaline phosphatase still elevated though at 220 previously was 180.  AST was 38 and ALT 65.  Previously AST 27 and ALT 44. Called pt and no new meds. Did drop citalopram to 10mg po qd and she is now off. Soumya 300mg po tid for 2 weeks or so. Asked if gaining weight and she is not. Smoking a lot of cigarettes and I have seen that before but not common to see. She will work on this and see how she does. She will try to work on that. Redo the labs in 2 weeks.  She stopped her lisinopril and changed to amlodipine and did not start as bp is low. 95/71.  January 19, 2022 ultrasound sent in. Liver appeared to be increased abd coarsened in regards to echogenicity.  No definite liver lesions.  No dilated ducts. Normal hepatopetal flow seen in the main portal vein.  Imaged hepatic veins also were patent. Common bile duct normal at 5 mm. Gallbladder absent. Right kidney 10.6 cm with no hydronephrosis. Left kidney 10.5 cm with no hydronephrosis.  Spleen normal at 8.7 cm. They suspected that the liver could have moderate diffuse fatty infiltration.  I would state that this was also at the time that you are having a medication effect so it could be that threw that reading off.     April 4, 2022 labs show glucose 93 which is normal BUN of 9 creatinine slightly up at 1.05 and was previously 1.01. May want to look at your hydrational state. Sodium 139 potassium 4.7 chloride 99 CO2 of 22. Albumin 4.8 bilirubin 1.2 which is normal and direct bilirubin 0.28 suggesting that you have Gilbert's syndrome or that indirect hyperbilirubinemia that is benign and present about 10% of people. Alkaline phosphatase down to 187 from 202 and down previously from 230.  AST of 24 down from 36 and prior 49. ALT 33 down from 41 and ALT 76.  She had a prior bump in lfts from working on epoxys.  Making cups.  She also asked re asa and crestor and  she has not started the aspirin yet and she was to start to crestor and she will start post the next labs.  March 21 labs show glucose still slightly up at 104 previously 106.  BUN of 7 and creatinine down to 1.01 from 1.19 so that was better.  Sodium 136 potassium 4.2 chloride 101 CO2 slightly low at 18. Total bilirubin normal at 1.1 previously 1.2 direct bilirubin 0.29 and I would suggest that you have Gilbert's.  Gilbert's as you recall is that indirect hyperbilirubinemia that is very common and present about 10% of people. The alkaline phosphatase is down to 202 from 230 and the AST is down to 36 from 49 and ALT 41 from 76.  It would appear that you change something? White blood cell count 9.7 hemoglobin 14.1 platelet count 444 down from 499. Important to note the white cell count also came down from 13.4 to 9.7.  Neutrophils 6.8 and lymphocytes 2.1 both normal. Called pt re the labs. She says did not change meds. 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   March 9 labs show white blood cell count 13.4 up from 11.7 and prior 10.7.  Hemoglobin 14.6.  Platelet count up a little at 499 from 445.  It has been variable before though.  MCV normal at 90.  Neutrophils up at 9.5 and lymphocytes 2.5 down from last time. Glucose 106 slightly up and BUN of 9 creatinine slightly higher at 1.9 and previously 1.15.   Sodium 137 potassium 4.1 chloride 100 CO2 slightly low at 19.  Albumin 4.0.  Total bilirubin 1.2 normal.  Direct bilirubin 0.35.  Alkaline phosphatase up to 233.  AST 49 and ALT 76 and previously AST 29 ALT 40. Called pt as labs trending up. Meds: propanol, furosemide, spirinolactone, basagalar, Spiriva and Symbicort, Geodon and lamotrigine. Ursodiol. Lamotrigine 1% risk for incl fts and on for a year. Geodon: category c for liver injury and occurs within 1-4 weeks and she has been on it for 6-8 weeks. She will discuss with psych re this.   March 3 labs show liver alkaline phosphatase fraction is 76% bone fraction is 24% and intestinal 0%  so clearly the alkaline phosphatase is coming mainly from liver.  This is the normal pattern that one would expect. March 3 labs also show albumin slightly up at 4.9 from 4.7.  Could reflect hydrational status. Total bilirubin normal at 1.2 and direct bilirubin 0.32 suggesting a Gilbert's syndrome which is a benign finding. Alkaline phosphatase remains elevated at 203 and previously was 175.  AST 29 and ALT 40 and previously AST 20 and ALT 34. Unfortunately these labs arrived very late so we need to go over with you what medicines you are on and since when.  They are not dramatically worse but they are definitely drifting up.  February 24 labs show white blood cell count elevated at 11.7 previously was 10.7.  Hemoglobin 14.5 normal platelet count 445 down from 468.  MCV 92.  Neutrophils elevated at 7.1 and lymphocytes 3.4.  Have you been ill?  Glucose 107 BUN of 12 creatinine 1.15 down from 1.26.  Normal is from 0.57 up to 1.0 so still elevated.  Sodium 137 k 3.8 chloride 98 CO2 of 22.  Albumin 4.7 normal now.  Limit 0.8 direct bilirubin 0.18 and alkaline phosphatase elevated at 175.  Previously 107.  AST 20 and ALT 34.  Previously AST 26 and ALT 30.  Need to watch this evolving alkaline phosphatase trend. Called pt: she had surgery on the right elbow and so this could be from bone and had moved ulnar nerve. She also is on some prednisone and she is on prednisone 2.5mg po qd. We need to fractionate the alkaline phosphatase. We can redo the labs next week to check on that.  February 2 labs show white blood cell count 10.7 hemoglobin 14.4 platelet count up at 468.  MCV was 90 which is normal.  Neutrophils 6.3 and lymphocytes elevated at 3.3. Glucose slightly up at 102 BUN of 13 creatinine elevated at 1.26 sodium 139 potassium 3.9 chloride 99 CO2 of 22 albumin elevated at 4.9 bilirubin 0.8 direct bilirubin 0.21 alkaline phosphatase 107 AST 26 and ALT 30 so clearly that must have been that herbal smooth product that you were on and the other herbals and we can reassess from there. Called pt: she had ketorolac also. Start the ursodiol 300mg po bid with food. She will do that and follow labs.  Started atorvastatin since dec 2020 and she is still on that for now and then to do the crestor,  January 4 local labs sent in. Glucose 159 elevated please share with primary provider.  He had a 16 creatinine 0.96 sodium 138 potassium 4.2 chloride 99 calcium 9.9 total bili 0.4 alkaline phosphatase 202 and . Prior December 29 labs showed alk phos 114 AST 28 and ALT 45 so clearly those numbers bumped. White blood cell count 8.4 hemoglobin 13.2 platelet count 350. Cholesterol 226 elevated.  Triglyceride 284 elevated.   elevated.  Hemoglobin A1c slightly better at 7.9 from 8.1%.  TSH 1.78.  Magnesium 2.2. Called pt: need new labs to see what labs doing.  Dec 29 labs: ast 48 and alt 31 and alk 111 and tb 0.4.  December 13 2021  labs show white blood  cell count elevated 11.9 but down from 12.6.  Hemoglobin 15 and platelets up at 503 and previously 394.  That can sometimes go up with inflammation or medicines.  Please be sure to share this info with primary providers. MCV normal at 90.  Neutrophils elevated 7.4 but down from 10.1 back on December 2.  Lymphocytes normal at 3.1 and monocytes slightly up at 1.0. Glucose elevated at 149 and was previously 114.  Hemoglobin A1c up to 8.1 and was previously 6.3.  You should look at this sugar issue again with your primary provider as that is unusual for you. BUN of 12 creatinine 1.19 which is up from 1.014 sodium 136 potassium 4.2 chloride 98 CO2 20. Calcium at 10.4 and was previously normal at 9.6 and I wonder if it is medicine related? Albumin 4.6 bilirubin 0.8 alkaline phosphatase slightly higher at 130 previously 126. AST down to 32 and ALT down to 49 previously AST 65 and ALT 66. C-reactive protein noted to be normal at 2.  Dec 1 labs much better: Tp 6.9 and alb 4.2 and tb 0.2 and db 0.10 and alk 111 and ast 19 and alt 43 and down from prior alk 301 and ast 77 and alt 171. So doing much better. Keep track of any new meds added or removed.  November 98 labs show 123 which is elevated BUN of 18 creatinine 0.99 sodium 137 potassium 5.1 chloride 99 CO2 of 24 albumin 4.7 bilirubin 0.5 alkaline phosphatase down to 118 from 194.  AST 29 and previously 21 ALT down to 46 from 53.  So clearly centimeters drop. She says she had increased the Seroquel and then being weaned on it now. She is now on 50 mg of Seroquel and she is to stop it tonight and she will redo labs in 1-2 weeks to be sure.  Ca 10.3 little up and she is not on supplements for this and follow course. She will let primary md know.  Oct 12: alb 4.5 and tb 0.6 and db 0.19 and alk 301 and ast 77 and alt 171 and prior ast 33 and alt 52. went to 25 and 50 and now 100mg.  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 Says taking dilaudid and ibuprofen for that. She says taking a dilaudid occ and one ibuprofen. 10-1 labs also off. Still on steroids 10mg po qd for the rheum arthritis so enbrel stopped working and went to this. She did myasthenia screen and neg and started on this and trying to do humira sunday.   10-1 alk 199 and ast 56 and alt 149.   September 21 ultrasound showed the liver to be 15.6 cm with mild hepatic steatosis but no focal mass. Liver vessels are noted to be patent. Prior cholecystectomy changes seen. No bile duct dilation seen with common bile duct 5 mm. Visualized pancreas portions were unremarkable proportions of the head and tail the pancreas were not seen due to gas. Right kidney 10.6 cm and left kidney 10.8 cm with no hydronephrosis. Spleen normal at 8.5 cm. Mild liver fat overall was seen patent vessels were seen. No ascites was seen.  September 21 labs show albumin 4.9 which remains slightly up bilirubin 0.4 which is down from 0.8 direct bilirubin 0.13 which is down from 0.23. Alkaline phosphatase down to 198 from 207 AST slightly up at 33 from 28 ALT 52 down from 66.  September 9 redo labs show albumin stable 4.9 bilirubin 0.8 direct 0.23 alkaline phosphatase slightly lower at 207 from 215. AST down to 28 from 61. ALT down to 66 from 86.  So it does appear the labs are dropping with lamictal drop 150 to 100mg and then went up on seroquel. Sept 7 labs: alb 4.8 and tb 0.7 and db 0.2 and alk 215 and Ast 61 and alt 86.  Prior aug 27 albs ast 18 and alt 28 and alk 135. that was pre lamictal changed.  She had prior drinking green tea for a few weeks. She has an immune issue and so the labs spiked up. then stopped and labs settled last year off that.  Pt was to do a bx and trouble breathing and Dr Self told to do barium swallow and manometry and did the barium and to do the manometry and says told was ok.  Ent saw some plaques and she had bx done.  Pelvic area pain noted and se started with pain and went to er and went [facility] and she said cyst in fallopian tube and ruptured and did a full hysterectomy and that was July 28 2021.  Ex  burned her citizenship certificate. She said she applied to get it. 555.00 for that.  Enbrel started earlier for seronegative RA.  She says needed onc re wbc up and asked him to do the lfts and she says part of labs back and she has from aug 8 2021. Ast and alt done and alk 209 and prior 138 and ast 149 and alt prior 34 and alt 231 and 76 july 19. b12 1804 and IGA 68 little low.  May 3:wbc 9.6 hg 13.7 and plat 355 and mcv 93. Neutrophils 6.5. glu 92 and cr 0.95 and na 137 and k 4.3 and ca 9.8 and alb 4.5 and tb 0.3 and alk 177 elevated and ast 30 and alt 70 and tesosterone level 6 normal per chart for your age. Prior alk 277 and ast 126 and alt 315. So came down post that doxycycline exposure.  April 6, 2021 [facility]: pancreas appears normal where seen and liver demonstrates normal size and contour and echogenicity. Liver vessels patent. Gallbladder absent. Common bile duct 4mm. No hydronephrosis seen to kidneys. Spleen 8.4cm. No liver mass seen.  Jan 11: glu 171 elevated and show local md. cr 1.06 and na 137 and k 4.0 and cl 98 and co2 21 and ca 9.7 normal and alb 4.6 and tb 0.7 and alk 88 and ast 13 and alt 16. So liver labs doing very well. A1c 5.9% noted.  Jan 20 2021: ct no cardiomegaly. Clear lung bases. Unremarkable liver. Prior gb surgery changes. Unremarkable spleen. Pancreas and and adrenal glands normal. cyst right kidney. left renal cyst. Mild rectosigmoid wall thickening. Liver vessels patent. No bulk adenopath.  Nov 9 2020 labs: wbc 13. 4hg 14 and plat 453 elevated slightly (150- 450 normal). na 135 k 4.6 and cl 97 and co2 26 and calcium elevated 11.0.  Show to local md: why is calcium up?alb 4.5 and tb 0.6 and alk 165 elevated and ast 22 and alt 36 so back down. chol 336 elevated and trg 307 and ldl 219. Lipids off and not sure if due to recent liver flare or other and may want to redo that as known if liver flared that this can be off. Vit d low 28.8. a1c 5.9%.  She says has had low vit d and she was to start this and she was started on 5000 a day for 4 m and she had been taking some vit d. She says she had stopped it for surgery July and restarted it again.  She says was on doxycycline and said had a prior hx of tcn reaction and filled and she said she may have been more ill from it. No reexposure.  Nov 3 2020 : ast 50 and alt 121 (both down then from 121 and 222). main she is off green tea and the lamictal also off prior. Suspect green tea was immune stimulant reving up your immune system. She has a very active immune system.  Rheum says she has fibromyalgia and doing water therapy.  11-2-20: ast down to 50 and alt 118 and prior 121 and alt 222 . alk 213 still up from 210 and tb down to 0.4 from 0.5.  Oct 26 labs tb 0.5 and alk 210 and ast 121 and alt 222. alk 248 and so now 210 so that is better, and ast 128 and that is now 121 and alt was 248 and now 222 so better and we need to follow.  Oct 19 labs spiked again: alk 198 and ast 128 and alt 248. Tb 0.5. na 136 and k k 4.5. bun 15 and cr 0.94 and glu 112.   Oct 6 ast 16 and alt 25 and alk 111. Prior she was also on abx for 6 weeks and so finished oct 8 or so and so that not the cause.  Oct 6: wbc 11.2 little up, hg 14.4 and hct 42.2, plat 141. Mcv 90, glu 103 little up and maybe not fasting. Bun 20 and cr 0.98 and na 135 and k 4.5, cl 99 and co2 22. Alb 4.2 and tb 0.5 and alk 111 and ast 16 and alt 25.  Prior visit lamictal came down from 75/50 to 50/25 as of oct 9 and added gabapentin 400mg and seroquel 25mg po qhs an atorvastatin 40mg po qhs.  Off the prior nasal exposures also.  Aunt Ewelina passed in oct 2020 had oltx and she had failed from 2nd tx. Not from covid 19.  [facility] aug 2020 [facility]: they saw mild fatty infiltration of the liver. Prior gb surgery changes seen. Visualized pancreas portions normal. No liver masses. Common duct 4mm. Portal vein patent. Kidneys unremarkable. Spleen 7.8cm unremarkable. Should get better as time passes from the issues we discussed.  Aug 4 2020 labs: glu 61 and little low bun 17 and cr 0.91 and na 135 and k 5.2 and cl 96 and co2 20 and ca 10.9 elevated and show local md. alb 4.7 and tb 0.6 and alk 104 and ast 24 and alt 28.  She was on ursodiol for has been on it for her liver started july 29 and she did stop it . She is staying off this as last time ok and may need it.  Nov 25 2020: wbc 11.3 and hg 14.1 plat 484 elevated and neutrophils 7.7 and glu 144 elevated and cr 1.08 little up.na 139 and k 4.3 and cl 99 and co2 24 and ca 10 and alb 4,7 and tb 0.3 and alk 130 and ast 15 and alt 18. Addendum: 1. Smoothie immune booster did. 2. Michelle took that. 3. She will confirm medicine.  Has lost 53 pounds as of july 2022 and now at 120 and lost 65 pounds.  Plan: 1.  Need the u.s in feb. 2. Do labs now and in 6 and 12 weeks and follow on adderall. 3. She call if issues. 4. She will relook at what she needs.  Stressed to pt the need for social distancing and strict handwashing and wearing a mask and to follow any other new or added CDC recommendations as this is an evolving target.   Duration of visit was 30  min with 10 m of prep and then 20 min by [place] with greater than 50% of time spent reviewing chart and events with the patient and in discussing plans.

## 2022-12-14 ENCOUNTER — LAB OUTSIDE AN ENCOUNTER (OUTPATIENT)
Dept: URBAN - METROPOLITAN AREA TELEHEALTH 2 | Facility: TELEHEALTH | Age: 47
End: 2022-12-14

## 2023-02-01 ENCOUNTER — LAB OUTSIDE AN ENCOUNTER (OUTPATIENT)
Dept: URBAN - METROPOLITAN AREA TELEHEALTH 2 | Facility: TELEHEALTH | Age: 48
End: 2023-02-01

## 2023-02-02 ENCOUNTER — LAB OUTSIDE AN ENCOUNTER (OUTPATIENT)
Dept: URBAN - METROPOLITAN AREA TELEHEALTH 2 | Facility: TELEHEALTH | Age: 48
End: 2023-02-02

## 2023-02-03 ENCOUNTER — OFFICE VISIT (OUTPATIENT)
Dept: URBAN - METROPOLITAN AREA CLINIC 22 | Facility: CLINIC | Age: 48
End: 2023-02-03
Payer: COMMERCIAL

## 2023-02-03 DIAGNOSIS — K76.0 FATTY (CHANGE OF) LIVER: ICD-10-CM

## 2023-02-03 PROCEDURE — 76705 ECHO EXAM OF ABDOMEN: CPT

## 2023-02-03 PROCEDURE — 93975 VASCULAR STUDY: CPT

## 2023-02-06 ENCOUNTER — WEB ENCOUNTER (OUTPATIENT)
Dept: URBAN - METROPOLITAN AREA CLINIC 86 | Facility: CLINIC | Age: 48
End: 2023-02-06

## 2023-02-17 ENCOUNTER — LAB OUTSIDE AN ENCOUNTER (OUTPATIENT)
Dept: URBAN - METROPOLITAN AREA TELEHEALTH 2 | Facility: TELEHEALTH | Age: 48
End: 2023-02-17

## 2023-02-17 ENCOUNTER — OFFICE VISIT (OUTPATIENT)
Dept: URBAN - METROPOLITAN AREA TELEHEALTH 2 | Facility: TELEHEALTH | Age: 48
End: 2023-02-17
Payer: COMMERCIAL

## 2023-02-17 VITALS — BODY MASS INDEX: 20.43 KG/M2 | WEIGHT: 111 LBS | HEIGHT: 62 IN

## 2023-02-17 DIAGNOSIS — K71.9 DRUG-INDUCED LIVER DISEASE: ICD-10-CM

## 2023-02-17 DIAGNOSIS — K63.5 POLYP OF COLON, UNSPECIFIED PART OF COLON, UNSPECIFIED TYPE: ICD-10-CM

## 2023-02-17 DIAGNOSIS — K75.4 AUTOIMMUNE HEPATITIS: ICD-10-CM

## 2023-02-17 PROBLEM — 161800001: Status: ACTIVE | Noted: 2021-08-23

## 2023-02-17 PROBLEM — 15167005: Status: ACTIVE | Noted: 2020-07-17

## 2023-02-17 PROBLEM — 238131007: Status: ACTIVE | Noted: 2020-07-17

## 2023-02-17 PROBLEM — 166643006: Status: ACTIVE | Noted: 2020-07-17

## 2023-02-17 PROBLEM — 191920007: Status: ACTIVE | Noted: 2020-07-17

## 2023-02-17 PROBLEM — 443913008: Status: ACTIVE | Noted: 2020-07-17

## 2023-02-17 PROBLEM — 68496003: Status: ACTIVE | Noted: 2021-08-23

## 2023-02-17 PROBLEM — 399112009: Status: ACTIVE | Noted: 2021-08-23

## 2023-02-17 PROBLEM — 193031009: Status: ACTIVE | Noted: 2020-07-17

## 2023-02-17 PROBLEM — 453861000124107: Status: ACTIVE | Noted: 2020-07-17

## 2023-02-17 PROBLEM — 428847004: Status: ACTIVE | Noted: 2020-07-17

## 2023-02-17 PROBLEM — 427399008: Status: ACTIVE | Noted: 2020-07-17

## 2023-02-17 PROBLEM — 191611001: Status: ACTIVE | Noted: 2020-09-09

## 2023-02-17 PROBLEM — 191627008: Status: ACTIVE | Noted: 2020-07-17

## 2023-02-17 PROCEDURE — 99214 OFFICE O/P EST MOD 30 MIN: CPT

## 2023-02-17 RX ORDER — INSULIN GLARGINE 100 [IU]/ML
10 UNITS A DAY INJECTION, SOLUTION SUBCUTANEOUS
Status: DISCONTINUED | COMMUNITY

## 2023-02-17 RX ORDER — BUDESONIDE, GLYCOPYRROLATE, AND FORMOTEROL FUMARATE 160; 9; 4.8 UG/1; UG/1; UG/1
2 PUFFS AEROSOL, METERED RESPIRATORY (INHALATION) TWICE A DAY
Status: ACTIVE | COMMUNITY

## 2023-02-17 RX ORDER — PROPRANOLOL HYDROCHLORIDE 60 MG/1
1 CAPSULE CAPSULE, EXTENDED RELEASE ORAL ONCE A DAY
Status: ACTIVE | COMMUNITY

## 2023-02-17 RX ORDER — ERGOCALCIFEROL 1.25 MG/1
1 CAPSULE CAPSULE, LIQUID FILLED ORAL
Status: ACTIVE | COMMUNITY

## 2023-02-17 RX ORDER — SPIRONOLACTONE 100 MG/1
1 TABLET TABLET, FILM COATED ORAL ONCE A DAY
Status: DISCONTINUED | COMMUNITY

## 2023-02-17 RX ORDER — PREDNISONE 2.5 MG/1
1 TABLET TABLET ORAL ONCE A DAY
Status: ON HOLD | COMMUNITY

## 2023-02-17 RX ORDER — LANSOPRAZOLE 30 MG/1
1 CAPSULE BEFORE A MEAL CAPSULE, DELAYED RELEASE ORAL ONCE A DAY
Status: ACTIVE | COMMUNITY

## 2023-02-17 RX ORDER — URSODIOL 300 MG/1
1 CAPSULE CAPSULE ORAL
Qty: 270 | Refills: 1

## 2023-02-17 RX ORDER — ZIPRASIDONE HYDROCHLORIDE 80 MG/1
1 CAPSULE WITH FOOD CAPSULE ORAL
Status: ACTIVE | COMMUNITY

## 2023-02-17 RX ORDER — URSODIOL 300 MG/1
1 CAPSULE CAPSULE ORAL
Qty: 270 | Refills: 1 | Status: ACTIVE | COMMUNITY

## 2023-02-17 RX ORDER — LISDEXAMFETAMINE DIMESYLATE 30 MG/1
1 CAPSULE IN THE MORNING CAPSULE ORAL ONCE A DAY
Status: ACTIVE | COMMUNITY

## 2023-02-17 RX ORDER — PRUCALOPRIDE 2 MG/1
1 TABLET TABLET, FILM COATED ORAL ONCE A DAY
Status: ACTIVE | COMMUNITY

## 2023-02-17 RX ORDER — DOXEPIN HYDROCHLORIDE 10 MG/1
3 CAPSULE AT BEDTIME CAPSULE ORAL ONCE A DAY
Status: DISCONTINUED | COMMUNITY

## 2023-02-17 RX ORDER — ATORVASTATIN CALCIUM 40 MG/1
1 TABLET TABLET, FILM COATED ORAL ONCE A DAY
Status: ACTIVE | COMMUNITY

## 2023-02-17 RX ORDER — FUROSEMIDE 40 MG/1
1 TABLET TABLET ORAL
Status: DISCONTINUED | COMMUNITY

## 2023-02-17 RX ORDER — PROMETHAZINE HYDROCHLORIDE 25 MG/1
1 TABLET AS NEEDED TABLET ORAL
Status: ACTIVE | COMMUNITY

## 2023-02-17 NOTE — HPI-TODAY'S VISIT:
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  Mom did well from the surgery and out of the sling now. Doing a clinical study for her lungs and for chronic bronchitis.  She says she had labs done last week. Rheum saw. 2-7-2023 and wbc 8.6 hg 15 and plat 387 and neutrophils 4.9 and lymph 2.7 and gly 89 and bun 11 and cr 0.85 and na 140 and k 4.2 and cl 99 and co2 24 and alb 4.7 and tb 0.9 and alk 161 and ast 25 and alt 29.  She is on new med: adderall to vyvanse and maybe for 3m. She is on ursodiol 300mg po tid.  9/14/22 labs were sent to us. White blood cell count 10.8, hemoglobin 13.2, MCV 95, platelets 337, lymphocytes 2.3, neutrophils 7.3 which is slightly elevated please make sure your primary care doctor is aware. Glucose 98, creatinine 0.65, sodium 142, potassium 4.1, bilirubin 0.5, alkaline phosphatase 146, AST 17, ALT 35. The alkaline phosphatase and the AST are trending in the right direction. Slight increase in the alt, previousl it was 31. We will continue to monitor this. Also of note the protein was slightly low at 5.9 , please share with your primary care.  Aug 5: glu 93 and bun 6 and cr 0.92 and na 141 and k 4.3 and cl 102 and co2 23. Wbc 10.4 and hg 14.6 and hct 44.3 and plat 404.  Mcv 95. Neutrophils 7.3 little up and prior normal 6.8. Lymphs 2.1. Alb 4.4 and tb 0.8 and db 0.22 and alk 150 ast 27 and alt 23.  Prior June 27: alk 183 and ast 18 and alt 31. So labs trending right way with some variation in ast and alt but still in normal range for ast and alt. No clear acute issues.  We will contact kesha for the u.s done feb 3.  Aug 5  ultrasound shows liver normal in contour and echogenicity.   No suspicious liver lesions. Common bile duct was normal at 2.4 mm and gallbladder absent. Right kidney 9.6 cm with no hydronephrosis. Spleen normal at 8.6 cm. Imaged pancreas portions unremarkable with tail partially not well seen due to gas. Liver vessels patent. Overall good to see that there was no focal lesions and no suspicion for any fibrotic morphology.  She started to drink again and she is having like tequila and has 1 ounce and doing 6 ounces on a rare day and 3 ounces a day. Started 1 month ago.  Pt says labs in Jan 2022 were up and she was ordered a medicine that had diclofenac and Geodon in Jan. She is stil on the geodon at 40mg.  June 27 labs show glucose normal at 86 BUN of 10 creatinine 0.93 down from 0.94.  Sodium 141 potassium 3.9 chloride 103 CO2 24. White blood count still up at 11 but down from 13.5 with.  Hemoglobin 13.2 which is stable.  Platelet count normal at 363 from 411.  MCV normal at 92.  Neutrophils normal at 6.8 and lymphocytes 3.0. Albumin 4.1 bilirubin 1.1 direct bilirubin 0.3.  Alk phos down to 183 from 213.  AST down to 18 and ALT down to 31 from previous AST 17 and ALT 35.  Ideal ALT is less than 25.   June 8 labs showed glucose elevated at 123 and previously was normal but has occasionally been elevated as back in May 3 it was 108.  Please share with primary provider.  Creatinine down to 0.94 from 1.15 so doing better there.  Sodium 138 potassium 4.2 chloride 103 CO2 of 22. White blood  cell count up at 13.5 from 10.3.  Were you ill recently?  Hemoglobin 13.2 platelet count down to normal at 411 from 452.  Neutrophils up at 10.8 and again wonder if he had a recent infection or vaccine?  Lymphocytes 2.1 normal. Albumin 4.5 bilirubin 0.6 which is stable direct bilirubin 0.2.  Alkaline phosphatase slightly lowered to 13 from 220 AST low at 17 from 21 previously 38.  ALT still slightly up at 35 but previously 35 and back on May 3 have been 65. Overall liver labs are dropping but slowing down on that. Any new meds? Maybe role of recent illness?  May 19 labs show glucose down to 97 from 108 before.  BUN of 10.  Creatinine still remains slightly up at 1.15 from 1.13.  He to stay hydrated with the increasing temperatures. Sodium 135 potassium 4.7 chloride 96 CO2 of 21. White blood cell count 10.3 hemoglobin 14.1 platelet count slightly up at 452 and was previously 430.  MCV 92.  Neutrophils 6.4 lymphocytes 2.7 normal. Albumin 4.6 bilirubin 0.6 which is down from 1.0 and prior 1.4.  Direct bilirubin 0.21.  Alk phos remains at 220 was previously 220 and prior 180.  AST down to 21 and previously 38.  ALT down to 35 from previously 65.  May 3 labs show glucose slightly up at 108 and may be not fasting that day?  BUN of 12 creatinine elevated at 1.13 previously 1.06.  Sodium 139 potassium 4.5 chloride 101 CO2 21.  The chloride and CO2 are back to normal which they were off last time. White blood cell count down to 10 hemoglobin 14.8 platelet count 430 MCV 90 neutrophils normal at 6.5 lymphocytes normal now at 2.5. Albumin 4.7 also down from 5.0.  Bilirubin normal now at 1.0 alkaline phosphatase still elevated though at 220 previously was 180.  AST was 38 and ALT 65.  Previously AST 27 and ALT 44. Called pt and no new meds. Did drop citalopram to 10mg po qd and she is now off. Soumya 300mg po tid for 2 weeks or so. Asked if gaining weight and she is not. Smoking a lot of cigarettes and I have seen that before but not common to see. She will work on this and see how she does. She will try to work on that. Redo the labs in 2 weeks.  She stopped her lisinopril and changed to amlodipine and did not start as bp is low. 95/71.  January 19, 2022 ultrasound sent in. Liver appeared to be increased abd coarsened in regards to echogenicity.  No definite liver lesions.  No dilated ducts. Normal hepatopetal flow seen in the main portal vein.  Imaged hepatic veins also were patent. Common bile duct normal at 5 mm. Gallbladder absent. Right kidney 10.6 cm with no hydronephrosis. Left kidney 10.5 cm with no hydronephrosis.  Spleen normal at 8.7 cm. They suspected that the liver could have moderate diffuse fatty infiltration.  I would state that this was also at the time that you are having a medication effect so it could be that threw that reading off.     April 4, 2022 labs show glucose 93 which is normal BUN of 9 creatinine slightly up at 1.05 and was previously 1.01. May want to look at your hydrational state. Sodium 139 potassium 4.7 chloride 99 CO2 of 22. Albumin 4.8 bilirubin 1.2 which is normal and direct bilirubin 0.28 suggesting that you have Gilbert's syndrome or that indirect hyperbilirubinemia that is benign and present about 10% of people. Alkaline phosphatase down to 187 from 202 and down previously from 230.  AST of 24 down from 36 and prior 49. ALT 33 down from 41 and ALT 76.  She had a prior bump in lfts from working on epoxys.  Making cups.  She also asked re asa and crestor and  she has not started the aspirin yet and she was to start to crestor and she will start post the next labs.  March 21 labs show glucose still slightly up at 104 previously 106.  BUN of 7 and creatinine down to 1.01 from 1.19 so that was better.  Sodium 136 potassium 4.2 chloride 101 CO2 slightly low at 18. Total bilirubin normal at 1.1 previously 1.2 direct bilirubin 0.29 and I would suggest that you have Gilbert's.  Gilbert's as you recall is that indirect hyperbilirubinemia that is very common and present about 10% of people. The alkaline phosphatase is down to 202 from 230 and the AST is down to 36 from 49 and ALT 41 from 76.  It would appear that you change something? White blood cell count 9.7 hemoglobin 14.1 platelet count 444 down from 499. Important to note the white cell count also came down from 13.4 to 9.7.  Neutrophils 6.8 and lymphocytes 2.1 both normal. Called pt re the labs. She says did not change meds. 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   March 9 labs show white blood cell count 13.4 up from 11.7 and prior 10.7.  Hemoglobin 14.6.  Platelet count up a little at 499 from 445.  It has been variable before though.  MCV normal at 90.  Neutrophils up at 9.5 and lymphocytes 2.5 down from last time. Glucose 106 slightly up and BUN of 9 creatinine slightly higher at 1.9 and previously 1.15.   Sodium 137 potassium 4.1 chloride 100 CO2 slightly low at 19.  Albumin 4.0.  Total bilirubin 1.2 normal.  Direct bilirubin 0.35.  Alkaline phosphatase up to 233.  AST 49 and ALT 76 and previously AST 29 ALT 40. Called pt as labs trending up. Meds: propanol, furosemide, spirinolactone, basagalar, Spiriva and Symbicort, Geodon and lamotrigine. Ursodiol. Lamotrigine 1% risk for incl fts and on for a year. Geodon: category c for liver injury and occurs within 1-4 weeks and she has been on it for 6-8 weeks. She will discuss with psych re this.   March 3 labs show liver alkaline phosphatase fraction is 76% bone fraction is 24% and intestinal 0%  so clearly the alkaline phosphatase is coming mainly from liver.  This is the normal pattern that one would expect. March 3 labs also show albumin slightly up at 4.9 from 4.7.  Could reflect hydrational status. Total bilirubin normal at 1.2 and direct bilirubin 0.32 suggesting a Gilbert's syndrome which is a benign finding. Alkaline phosphatase remains elevated at 203 and previously was 175.  AST 29 and ALT 40 and previously AST 20 and ALT 34. Unfortunately these labs arrived very late so we need to go over with you what medicines you are on and since when.  They are not dramatically worse but they are definitely drifting up.  February 24 labs show white blood cell count elevated at 11.7 previously was 10.7.  Hemoglobin 14.5 normal platelet count 445 down from 468.  MCV 92.  Neutrophils elevated at 7.1 and lymphocytes 3.4.  Have you been ill?  Glucose 107 BUN of 12 creatinine 1.15 down from 1.26.  Normal is from 0.57 up to 1.0 so still elevated.  Sodium 137 k 3.8 chloride 98 CO2 of 22.  Albumin 4.7 normal now.  Limit 0.8 direct bilirubin 0.18 and alkaline phosphatase elevated at 175.  Previously 107.  AST 20 and ALT 34.  Previously AST 26 and ALT 30.  Need to watch this evolving alkaline phosphatase trend. Called pt: she had surgery on the right elbow and so this could be from bone and had moved ulnar nerve. She also is on some prednisone and she is on prednisone 2.5mg po qd. We need to fractionate the alkaline phosphatase. We can redo the labs next week to check on that.  February 2 labs show white blood cell count 10.7 hemoglobin 14.4 platelet count up at 468.  MCV was 90 which is normal.  Neutrophils 6.3 and lymphocytes elevated at 3.3. Glucose slightly up at 102 BUN of 13 creatinine elevated at 1.26 sodium 139 potassium 3.9 chloride 99 CO2 of 22 albumin elevated at 4.9 bilirubin 0.8 direct bilirubin 0.21 alkaline phosphatase 107 AST 26 and ALT 30 so clearly that must have been that herbal smooth product that you were on and the other herbals and we can reassess from there. Called pt: she had ketorolac also. Start the ursodiol 300mg po bid with food. She will do that and follow labs.  Started atorvastatin since dec 2020 and she is still on that for now and then to do the crestor,  January 4 local labs sent in. Glucose 159 elevated please share with primary provider.  He had a 16 creatinine 0.96 sodium 138 potassium 4.2 chloride 99 calcium 9.9 total bili 0.4 alkaline phosphatase 202 and . Prior December 29 labs showed alk phos 114 AST 28 and ALT 45 so clearly those numbers bumped. White blood cell count 8.4 hemoglobin 13.2 platelet count 350. Cholesterol 226 elevated.  Triglyceride 284 elevated.   elevated.  Hemoglobin A1c slightly better at 7.9 from 8.1%.  TSH 1.78.  Magnesium 2.2. Called pt: need new labs to see what labs doing.  Dec 29 labs: ast 48 and alt 31 and alk 111 and tb 0.4.  December 13 2021  labs show white blood  cell count elevated 11.9 but down from 12.6.  Hemoglobin 15 and platelets up at 503 and previously 394.  That can sometimes go up with inflammation or medicines.  Please be sure to share this info with primary providers. MCV normal at 90.  Neutrophils elevated 7.4 but down from 10.1 back on December 2.  Lymphocytes normal at 3.1 and monocytes slightly up at 1.0. Glucose elevated at 149 and was previously 114.  Hemoglobin A1c up to 8.1 and was previously 6.3.  You should look at this sugar issue again with your primary provider as that is unusual for you. BUN of 12 creatinine 1.19 which is up from 1.014 sodium 136 potassium 4.2 chloride 98 CO2 20. Calcium at 10.4 and was previously normal at 9.6 and I wonder if it is medicine related? Albumin 4.6 bilirubin 0.8 alkaline phosphatase slightly higher at 130 previously 126. AST down to 32 and ALT down to 49 previously AST 65 and ALT 66. C-reactive protein noted to be normal at 2.  Dec 1 labs much better: Tp 6.9 and alb 4.2 and tb 0.2 and db 0.10 and alk 111 and ast 19 and alt 43 and down from prior alk 301 and ast 77 and alt 171. So doing much better. Keep track of any new meds added or removed.  November 98 labs show 123 which is elevated BUN of 18 creatinine 0.99 sodium 137 potassium 5.1 chloride 99 CO2 of 24 albumin 4.7 bilirubin 0.5 alkaline phosphatase down to 118 from 194.  AST 29 and previously 21 ALT down to 46 from 53.  So clearly centimeters drop. She says she had increased the Seroquel and then being weaned on it now. She is now on 50 mg of Seroquel and she is to stop it tonight and she will redo labs in 1-2 weeks to be sure.  Ca 10.3 little up and she is not on supplements for this and follow course. She will let primary md know.  Oct 12: alb 4.5 and tb 0.6 and db 0.19 and alk 301 and ast 77 and alt 171 and prior ast 33 and alt 52. went to 25 and 50 and now 100mg.  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 Says taking dilaudid and ibuprofen for that. She says taking a dilaudid occ and one ibuprofen. 10-1 labs also off. Still on steroids 10mg po qd for the rheum arthritis so enbrel stopped working and went to this. She did myasthenia screen and neg and started on this and trying to do humira sunday.   10-1 alk 199 and ast 56 and alt 149.   September 21 ultrasound showed the liver to be 15.6 cm with mild hepatic steatosis but no focal mass. Liver vessels are noted to be patent. Prior cholecystectomy changes seen. No bile duct dilation seen with common bile duct 5 mm. Visualized pancreas portions were unremarkable proportions of the head and tail the pancreas were not seen due to gas. Right kidney 10.6 cm and left kidney 10.8 cm with no hydronephrosis. Spleen normal at 8.5 cm. Mild liver fat overall was seen patent vessels were seen. No ascites was seen.  September 21 labs show albumin 4.9 which remains slightly up bilirubin 0.4 which is down from 0.8 direct bilirubin 0.13 which is down from 0.23. Alkaline phosphatase down to 198 from 207 AST slightly up at 33 from 28 ALT 52 down from 66.  September 9 redo labs show albumin stable 4.9 bilirubin 0.8 direct 0.23 alkaline phosphatase slightly lower at 207 from 215. AST down to 28 from 61. ALT down to 66 from 86.  So it does appear the labs are dropping with lamictal drop 150 to 100mg and then went up on seroquel. Sept 7 labs: alb 4.8 and tb 0.7 and db 0.2 and alk 215 and Ast 61 and alt 86.  Prior aug 27 albs ast 18 and alt 28 and alk 135. that was pre lamictal changed.  She had prior drinking green tea for a few weeks. She has an immune issue and so the labs spiked up. then stopped and labs settled last year off that.  Pt was to do a bx and trouble breathing and Dr Self told to do barium swallow and manometry and did the barium and to do the manometry and says told was ok.  Ent saw some plaques and she had bx done.  Pelvic area pain noted and se started with pain and went to er and went [facility] to [facility] and she said cyst in fallopian tube and ruptured and did a full hysterectomy and that was July 28 2021.  Ex  burned her citizenship certificate. She said she applied to get it. 555.00 for that.  Enbrel started earlier for seronegative RA.  She says needed onc re wbc up and asked him to do the lfts and she says part of labs back and she has from aug 8 2021. Ast and alt done and alk 209 and prior 138 and ast 149 and alt prior 34 and alt 231 and 76 july 19. b12 1804 and IGA 68 little low.  May 3:wbc 9.6 hg 13.7 and plat 355 and mcv 93. Neutrophils 6.5. glu 92 and cr 0.95 and na 137 and k 4.3 and ca 9.8 and alb 4.5 and tb 0.3 and alk 177 elevated and ast 30 and alt 70 and tesosterone level 6 normal per chart for your age. Prior alk 277 and ast 126 and alt 315. So came down post that doxycycline exposure.  April 6, 2021 [facility]: pancreas appears normal where seen and liver demonstrates normal size and contour and echogenicity. Liver vessels patent. Gallbladder absent. Common bile duct 4mm. No hydronephrosis seen to kidneys. Spleen 8.4cm. No liver mass seen.  Jan 11: glu 171 elevated and show local md. cr 1.06 and na 137 and k 4.0 and cl 98 and co2 21 and ca 9.7 normal and alb 4.6 and tb 0.7 and alk 88 and ast 13 and alt 16. So liver labs doing very well. A1c 5.9% noted.  Jan 20 2021: ct no cardiomegaly. Clear lung bases. Unremarkable liver. Prior gb surgery changes. Unremarkable spleen. Pancreas and and adrenal glands normal. cyst right kidney. left renal cyst. Mild rectosigmoid wall thickening. Liver vessels patent. No bulk adenopath.  Nov 9 2020 labs: wbc 13. 4hg 14 and plat 453 elevated slightly (150- 450 normal). na 135 k 4.6 and cl 97 and co2 26 and calcium elevated 11.0.  Show to local md: why is calcium up?alb 4.5 and tb 0.6 and alk 165 elevated and ast 22 and alt 36 so back down. chol 336 elevated and trg 307 and ldl 219. Lipids off and not sure if due to recent liver flare or other and may want to redo that as known if liver flared that this can be off. Vit d low 28.8. a1c 5.9%.  She says has had low vit d and she was to start this and she was started on 5000 a day for 4 m and she had been taking some vit d. She says she had stopped it for surgery July and restarted it again.  She says was on doxycycline and said had a prior hx of tcn reaction and filled and she said she may have been more ill from it. No reexposure.  Nov 3 2020 : ast 50 and alt 121 (both down then from 121 and 222). main she is off green tea and the lamictal also off prior. Suspect green tea was immune stimulant reving up your immune system. She has a very active immune system.  Rheum says she has fibromyalgia and doing water therapy.  11-2-20: ast down to 50 and alt 118 and prior 121 and alt 222 . alk 213 still up from 210 and tb down to 0.4 from 0.5.  Oct 26 labs tb 0.5 and alk 210 and ast 121 and alt 222. alk 248 and so now 210 so that is better, and ast 128 and that is now 121 and alt was 248 and now 222 so better and we need to follow.  Oct 19 labs spiked again: alk 198 and ast 128 and alt 248. Tb 0.5. na 136 and k k 4.5. bun 15 and cr 0.94 and glu 112.   Oct 6 ast 16 and alt 25 and alk 111. Prior she was also on abx for 6 weeks and so finished oct 8 or so and so that not the cause.  Oct 6: wbc 11.2 little up, hg 14.4 and hct 42.2, plat 141. Mcv 90, glu 103 little up and maybe not fasting. Bun 20 and cr 0.98 and na 135 and k 4.5, cl 99 and co2 22. Alb 4.2 and tb 0.5 and alk 111 and ast 16 and alt 25.  Prior visit lamictal came down from 75/50 to 50/25 as of oct 9 and added gabapentin 400mg and seroquel 25mg po qhs an atorvastatin 40mg po qhs.  Off the prior nasal exposures also.  Aunt Ewelina passed in oct 2020 had oltx and she had failed from 2nd tx. Not from covid 19.  U.s aug 2020 [facility]: they saw mild fatty infiltration of the liver. Prior gb surgery changes seen. Visualized pancreas portions normal. No liver masses. Common duct 4mm. Portal vein patent. Kidneys unremarkable. Spleen 7.8cm unremarkable. Should get better as time passes from the issues we discussed.  Aug 4 2020 labs: glu 61 and little low bun 17 and cr 0.91 and na 135 and k 5.2 and cl 96 and co2 20 and ca 10.9 elevated and show local md. alb 4.7 and tb 0.6 and alk 104 and ast 24 and alt 28.  She was on ursodiol for has been on it for her liver started july 29 and she did stop it . She is staying off this as last time ok and may need it.  Nov 25 2020: wbc 11.3 and hg 14.1 plat 484 elevated and neutrophils 7.7 and glu 144 elevated and cr 1.08 little up.na 139 and k 4.3 and cl 99 and co2 24 and ca 10 and alb 4,7 and tb 0.3 and alk 130 and ast 15 and alt 18. Addendum: 1. Smoothie immune booster did. 2. Michelle took that. 3. She will confirm medicine.  Has lost 53 pounds as of july 2022 and now at 120 and lost 65 pounds.  Plan: 1.  Need track down the u.s report. 2.  Pt couneled re the alcohol. 3.  Pt will do labs in 6 and 12 weeks. 4. Pt will call if issues.  Stressed to pt the need for social distancing and strict handwashing and wearing a mask and to follow any other new or added CDC recommendations as this is an evolving target.   Duration of visit was 37 min with 10 m of prep and then 27 min by milton with greater than 50% of time spent reviewing chart and events with the patient and in discussing plans.

## 2023-02-21 ENCOUNTER — TELEPHONE ENCOUNTER (OUTPATIENT)
Dept: URBAN - METROPOLITAN AREA CLINIC 92 | Facility: CLINIC | Age: 48
End: 2023-02-21

## 2023-02-21 NOTE — HPI-TODAY'S VISIT:
Dear Chanda Sarmiento, With a bit of  work, Melanie was able to figure out that they had sent the report from February 3 to your previous account which was under the last name of [place] and that is why we had not received it. The report from February 6 showed that the pancreas was unremarkable, the liver was homogeneous/even in echotexture with no discrete lesions. Liver vessels were patent. Common bile duct was normal at 2 mm.  Right kidney was 10.8 cm with no hydronephrosis. Spleen was normal at 7.9 cm with splenic vessels patent. Please work on the issues that we talked about the other day during the visit, as I left the visit very concerned. Dr. Issa

## 2023-02-28 PROBLEM — 197321007 FATTY LIVER: Status: ACTIVE | Noted: 2023-02-28

## 2023-03-27 ENCOUNTER — LAB OUTSIDE AN ENCOUNTER (OUTPATIENT)
Dept: URBAN - METROPOLITAN AREA TELEHEALTH 2 | Facility: TELEHEALTH | Age: 48
End: 2023-03-27

## 2023-05-15 ENCOUNTER — LAB OUTSIDE AN ENCOUNTER (OUTPATIENT)
Dept: URBAN - METROPOLITAN AREA TELEHEALTH 2 | Facility: TELEHEALTH | Age: 48
End: 2023-05-15

## 2023-05-31 ENCOUNTER — OFFICE VISIT (OUTPATIENT)
Dept: URBAN - METROPOLITAN AREA TELEHEALTH 2 | Facility: TELEHEALTH | Age: 48
End: 2023-05-31
Payer: COMMERCIAL

## 2023-05-31 VITALS — HEIGHT: 62 IN | WEIGHT: 110 LBS | BODY MASS INDEX: 20.24 KG/M2

## 2023-05-31 DIAGNOSIS — K71.9 DRUG-INDUCED LIVER DISEASE: ICD-10-CM

## 2023-05-31 DIAGNOSIS — K63.5 POLYP OF COLON, UNSPECIFIED PART OF COLON, UNSPECIFIED TYPE: ICD-10-CM

## 2023-05-31 DIAGNOSIS — E66.3 OVERWEIGHT: ICD-10-CM

## 2023-05-31 DIAGNOSIS — F33.1 MODERATE EPISODE OF RECURRENT MAJOR DEPRESSIVE DISORDER: ICD-10-CM

## 2023-05-31 DIAGNOSIS — Z79.899 HIGH RISK MEDICATION USE: ICD-10-CM

## 2023-05-31 DIAGNOSIS — Z90.710 HISTORY OF HYSTERECTOMY: ICD-10-CM

## 2023-05-31 DIAGNOSIS — K75.4 AUTOIMMUNE HEPATITIS: ICD-10-CM

## 2023-05-31 DIAGNOSIS — Z71.6 TOBACCO ABUSE COUNSELING: ICD-10-CM

## 2023-05-31 DIAGNOSIS — F19.90 ALCOHOL USE DISORDER: ICD-10-CM

## 2023-05-31 DIAGNOSIS — F31.30 BIPOLAR AFFECTIVE DISORDER, CURRENT EPISODE DEPRESSED, CURRENT EPISODE SEVERITY UNSPECIFIED: ICD-10-CM

## 2023-05-31 DIAGNOSIS — F14.11 COCAINE ABUSE IN REMISSION: ICD-10-CM

## 2023-05-31 DIAGNOSIS — D80.2 IGA DEFICIENCY: ICD-10-CM

## 2023-05-31 DIAGNOSIS — G44.009: ICD-10-CM

## 2023-05-31 DIAGNOSIS — F10.11 ALCOHOL USE DISORDER, MILD, IN EARLY REMISSION, ABUSE: ICD-10-CM

## 2023-05-31 DIAGNOSIS — Z71.89 VACCINE COUNSELING: ICD-10-CM

## 2023-05-31 DIAGNOSIS — M13.80 SERONEGATIVE ARTHRITIS: ICD-10-CM

## 2023-05-31 DIAGNOSIS — R74.8 ABNORMAL LIVER ENZYMES: ICD-10-CM

## 2023-05-31 DIAGNOSIS — Z98.890 HISTORY OF FUNDOPLICATION: ICD-10-CM

## 2023-05-31 PROCEDURE — 99214 OFFICE O/P EST MOD 30 MIN: CPT

## 2023-05-31 RX ORDER — LISDEXAMFETAMINE DIMESYLATE 30 MG/1
1 CAPSULE IN THE MORNING CAPSULE ORAL ONCE A DAY
Status: ACTIVE | COMMUNITY

## 2023-05-31 RX ORDER — PROMETHAZINE HYDROCHLORIDE 25 MG/1
1 TABLET AS NEEDED TABLET ORAL
Status: ACTIVE | COMMUNITY

## 2023-05-31 RX ORDER — LANSOPRAZOLE 30 MG/1
1 CAPSULE BEFORE A MEAL CAPSULE, DELAYED RELEASE ORAL ONCE A DAY
Status: DISCONTINUED | COMMUNITY

## 2023-05-31 RX ORDER — URSODIOL 300 MG/1
1 CAPSULE CAPSULE ORAL
Qty: 270 | Refills: 1 | Status: ACTIVE | COMMUNITY

## 2023-05-31 RX ORDER — MIRTAZAPINE 15 MG/1
1 TABLET AT BEDTIME TABLET, FILM COATED ORAL ONCE A DAY
Status: ACTIVE | COMMUNITY

## 2023-05-31 RX ORDER — PROPRANOLOL HYDROCHLORIDE 60 MG/1
1 CAPSULE CAPSULE, EXTENDED RELEASE ORAL ONCE A DAY
Status: DISCONTINUED | COMMUNITY

## 2023-05-31 RX ORDER — ZIPRASIDONE HYDROCHLORIDE 80 MG/1
1 CAPSULE WITH FOOD CAPSULE ORAL
Status: DISCONTINUED | COMMUNITY

## 2023-05-31 RX ORDER — ERGOCALCIFEROL 1.25 MG/1
1 CAPSULE CAPSULE, LIQUID FILLED ORAL
Status: ACTIVE | COMMUNITY

## 2023-05-31 RX ORDER — BUDESONIDE, GLYCOPYRROLATE, AND FORMOTEROL FUMARATE 160; 9; 4.8 UG/1; UG/1; UG/1
2 PUFFS AEROSOL, METERED RESPIRATORY (INHALATION) TWICE A DAY
Status: ACTIVE | COMMUNITY

## 2023-05-31 RX ORDER — ATORVASTATIN CALCIUM 40 MG/1
1 TABLET TABLET, FILM COATED ORAL ONCE A DAY
Status: DISCONTINUED | COMMUNITY

## 2023-05-31 RX ORDER — PRUCALOPRIDE 2 MG/1
1 TABLET TABLET, FILM COATED ORAL ONCE A DAY
Status: DISCONTINUED | COMMUNITY

## 2023-05-31 RX ORDER — PREDNISONE 2.5 MG/1
1 TABLET TABLET ORAL ONCE A DAY
Status: ON HOLD | COMMUNITY

## 2023-05-31 RX ORDER — URSODIOL 300 MG/1
1 CAPSULE CAPSULE ORAL
Qty: 270 | Refills: 1

## 2023-05-31 NOTE — HPI-TODAY'S VISIT:
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  Pt is stil having some alcohol but she has cut back some and she is following with therapy and having 8 ounces and day and if have more than 2-3 drinks a day at risk for cirrhosis,  May 11 2023 glu 70 and and bun 11 and cr 0.68 and na 136 and k 4 and cl 99 and co2 26 and ca 10.3 and alb 4.6 and tb 1.1 and alk 157 and ast 34 and alt 60 and mg 2.1 and tsh 1.10 and wbc 9.3 and hg 15.9 and hct 45.5 and platelet 362, mcv 93.4  neutrophils 6371 and lymphs 2018.[id number] and folate 14 vit d 78 and ethyl alcohol and over [id number].  February 6 showed that the pancreas was unremarkable, the liver was homogeneous/even in echotexture with no discrete lesions. Liver vessels were patent. Common bile duct was normal at 2 mm.  Right kidney was 10.8 cm with no hydronephrosis. Spleen was normal at 7.9 cm with splenic vessels patent.  Pt says she was drinking more in 2020 and not until this past year. 6m of this,  Smoking 1/2 ppd and need to work on this.  She says mother is doing study with [facility]. Doing a clinical study for her lungs and for chronic bronchitis.  She says she had labs done last week. Rheum saw.  2-7-2023 and wbc 8.6 hg 15 and plat 387 and neutrophils 4.9 and lymph 2.7 and gly 89 and bun 11 and cr 0.85 and na 140 and k 4.2 and cl 99 and co2 24 and alb 4.7 and tb 0.9 and alk 161 and ast 25 and alt 29.  Alcohol could not sense with the vyvance.  She was changed from adderall to vyvanse and maybe for 3m. She is on ursodiol 300mg po tid.  9/14/22 labs were sent to us. White blood cell count 10.8, hemoglobin 13.2, MCV 95, platelets 337, lymphocytes 2.3, neutrophils 7.3 which is slightly elevated please make sure your primary care doctor is aware. Glucose 98, creatinine 0.65, sodium 142, potassium 4.1, bilirubin 0.5, alkaline phosphatase 146, AST 17, ALT 35. The alkaline phosphatase and the AST are trending in the right direction. Slight increase in the alt, previousl it was 31. We will continue to monitor this. Also of note the protein was slightly low at 5.9 , please share with your primary care.  Aug 5: glu 93 and bun 6 and cr 0.92 and na 141 and k 4.3 and cl 102 and co2 23. Wbc 10.4 and hg 14.6 and hct 44.3 and plat 404.  Mcv 95. Neutrophils 7.3 little up and prior normal 6.8. Lymphs 2.1. Alb 4.4 and tb 0.8 and db 0.22 and alk 150 ast 27 and alt 23.  Prior June 27: alk 183 and ast 18 and alt 31. So labs trending right way with some variation in ast and alt but still in normal range for ast and alt. No clear acute issues.  We will contact kesha for the u.s done feb 3.  Aug 5  ultrasound shows liver normal in contour and echogenicity.   No suspicious liver lesions. Common bile duct was normal at 2.4 mm and gallbladder absent. Right kidney 9.6 cm with no hydronephrosis. Spleen normal at 8.6 cm. Imaged pancreas portions unremarkable with tail partially not well seen due to gas. Liver vessels patent. Overall good to see that there was no focal lesions and no suspicion for any fibrotic morphology.  She started to drink again and she is having like tequila and has 1 ounce and doing 6 ounces on a rare day and 3 ounces a day. Started 1 month ago.  Pt says labs in Jan 2022 were up and she was ordered a medicine that had diclofenac and Geodon in Jan. She is stil on the geodon at 40mg.  June 27 labs show glucose normal at 86 BUN of 10 creatinine 0.93 down from 0.94.  Sodium 141 potassium 3.9 chloride 103 CO2 24. White blood count still up at 11 but down from 13.5 with.  Hemoglobin 13.2 which is stable.  Platelet count normal at 363 from 411.  MCV normal at 92.  Neutrophils normal at 6.8 and lymphocytes 3.0. Albumin 4.1 bilirubin 1.1 direct bilirubin 0.3.  Alk phos down to 183 from 213.  AST down to 18 and ALT down to 31 from previous AST 17 and ALT 35.  Ideal ALT is less than 25.   June 8 labs showed glucose elevated at 123 and previously was normal but has occasionally been elevated as back in May 3 it was 108.  Please share with primary provider.  Creatinine down to 0.94 from 1.15 so doing better there.  Sodium 138 potassium 4.2 chloride 103 CO2 of 22. White blood  cell count up at 13.5 from 10.3.  Were you ill recently?  Hemoglobin 13.2 platelet count down to normal at 411 from 452.  Neutrophils up at 10.8 and again wonder if he had a recent infection or vaccine?  Lymphocytes 2.1 normal. Albumin 4.5 bilirubin 0.6 which is stable direct bilirubin 0.2.  Alkaline phosphatase slightly lowered to 13 from 220 AST low at 17 from 21 previously 38.  ALT still slightly up at 35 but previously 35 and back on May 3 have been 65. Overall liver labs are dropping but slowing down on that. Any new meds? Maybe role of recent illness?  May 19 labs show glucose down to 97 from 108 before.  BUN of 10.  Creatinine still remains slightly up at 1.15 from 1.13.  He to stay hydrated with the increasing temperatures. Sodium 135 potassium 4.7 chloride 96 CO2 of 21. White blood cell count 10.3 hemoglobin 14.1 platelet count slightly up at 452 and was previously 430.  MCV 92.  Neutrophils 6.4 lymphocytes 2.7 normal. Albumin 4.6 bilirubin 0.6 which is down from 1.0 and prior 1.4.  Direct bilirubin 0.21.  Alk phos remains at 220 was previously 220 and prior 180.  AST down to 21 and previously 38.  ALT down to 35 from previously 65.  May 3 labs show glucose slightly up at 108 and may be not fasting that day?  BUN of 12 creatinine elevated at 1.13 previously 1.06.  Sodium 139 potassium 4.5 chloride 101 CO2 21.  The chloride and CO2 are back to normal which they were off last time. White blood cell count down to 10 hemoglobin 14.8 platelet count 430 MCV 90 neutrophils normal at 6.5 lymphocytes normal now at 2.5. Albumin 4.7 also down from 5.0.  Bilirubin normal now at 1.0 alkaline phosphatase still elevated though at 220 previously was 180.  AST was 38 and ALT 65.  Previously AST 27 and ALT 44. Called pt and no new meds. Did drop citalopram to 10mg po qd and she is now off. Soumya 300mg po tid for 2 weeks or so. Asked if gaining weight and she is not. Smoking a lot of cigarettes and I have seen that before but not common to see. She will work on this and see how she does. She will try to work on that. Redo the labs in 2 weeks.  She stopped her lisinopril and changed to amlodipine and did not start as bp is low. 95/71.  January 19, 2022 ultrasound sent in. Liver appeared to be increased abd coarsened in regards to echogenicity.  No definite liver lesions.  No dilated ducts. Normal hepatopetal flow seen in the main portal vein.  Imaged hepatic veins also were patent. Common bile duct normal at 5 mm. Gallbladder absent. Right kidney 10.6 cm with no hydronephrosis. Left kidney 10.5 cm with no hydronephrosis.  Spleen normal at 8.7 cm. They suspected that the liver could have moderate diffuse fatty infiltration.  I would state that this was also at the time that you are having a medication effect so it could be that threw that reading off.     April 4, 2022 labs show glucose 93 which is normal BUN of 9 creatinine slightly up at 1.05 and was previously 1.01. May want to look at your hydrational state. Sodium 139 potassium 4.7 chloride 99 CO2 of 22. Albumin 4.8 bilirubin 1.2 which is normal and direct bilirubin 0.28 suggesting that you have Gilbert's syndrome or that indirect hyperbilirubinemia that is benign and present about 10% of people. Alkaline phosphatase down to 187 from 202 and down previously from 230.  AST of 24 down from 36 and prior 49. ALT 33 down from 41 and ALT 76.  She had a prior bump in lfts from working on epoxys.  Making cups.  She also asked re asa and crestor and  she has not started the aspirin yet and she was to start to crestor and she will start post the next labs.  March 21 labs show glucose still slightly up at 104 previously 106.  BUN of 7 and creatinine down to 1.01 from 1.19 so that was better.  Sodium 136 potassium 4.2 chloride 101 CO2 slightly low at 18. Total bilirubin normal at 1.1 previously 1.2 direct bilirubin 0.29 and I would suggest that you have Gilbert's.  Gilbert's as you recall is that indirect hyperbilirubinemia that is very common and present about 10% of people. The alkaline phosphatase is down to 202 from 230 and the AST is down to 36 from 49 and ALT 41 from 76.  It would appear that you change something? White blood cell count 9.7 hemoglobin 14.1 platelet count 444 down from 499. Important to note the white cell count also came down from 13.4 to 9.7.  Neutrophils 6.8 and lymphocytes 2.1 both normal. Called pt re the labs. She says did not change meds. 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   March 9 labs show white blood cell count 13.4 up from 11.7 and prior 10.7.  Hemoglobin 14.6.  Platelet count up a little at 499 from 445.  It has been variable before though.  MCV normal at 90.  Neutrophils up at 9.5 and lymphocytes 2.5 down from last time. Glucose 106 slightly up and BUN of 9 creatinine slightly higher at 1.9 and previously 1.15.   Sodium 137 potassium 4.1 chloride 100 CO2 slightly low at 19.  Albumin 4.0.  Total bilirubin 1.2 normal.  Direct bilirubin 0.35.  Alkaline phosphatase up to 233.  AST 49 and ALT 76 and previously AST 29 ALT 40. Called pt as labs trending up. Meds: propanol, furosemide, spirinolactone, basagalar, Spiriva and Symbicort, Geodon and lamotrigine. Ursodiol. Lamotrigine 1% risk for incl fts and on for a year. Geodon: category c for liver injury and occurs within 1-4 weeks and she has been on it for 6-8 weeks. She will discuss with psych re this.   March 3 labs show liver alkaline phosphatase fraction is 76% bone fraction is 24% and intestinal 0%  so clearly the alkaline phosphatase is coming mainly from liver.  This is the normal pattern that one would expect. March 3 labs also show albumin slightly up at 4.9 from 4.7.  Could reflect hydrational status. Total bilirubin normal at 1.2 and direct bilirubin 0.32 suggesting a Gilbert's syndrome which is a benign finding. Alkaline phosphatase remains elevated at 203 and previously was 175.  AST 29 and ALT 40 and previously AST 20 and ALT 34. Unfortunately these labs arrived very late so we need to go over with you what medicines you are on and since when.  They are not dramatically worse but they are definitely drifting up.  February 24 labs show white blood cell count elevated at 11.7 previously was 10.7.  Hemoglobin 14.5 normal platelet count 445 down from 468.  MCV 92.  Neutrophils elevated at 7.1 and lymphocytes 3.4.  Have you been ill?  Glucose 107 BUN of 12 creatinine 1.15 down from 1.26.  Normal is from 0.57 up to 1.0 so still elevated.  Sodium 137 k 3.8 chloride 98 CO2 of 22.  Albumin 4.7 normal now.  Limit 0.8 direct bilirubin 0.18 and alkaline phosphatase elevated at 175.  Previously 107.  AST 20 and ALT 34.  Previously AST 26 and ALT 30.  Need to watch this evolving alkaline phosphatase trend. Called pt: she had surgery on the right elbow and so this could be from bone and had moved ulnar nerve. She also is on some prednisone and she is on prednisone 2.5mg po qd. We need to fractionate the alkaline phosphatase. We can redo the labs next week to check on that.  February 2 labs show white blood cell count 10.7 hemoglobin 14.4 platelet count up at 468.  MCV was 90 which is normal.  Neutrophils 6.3 and lymphocytes elevated at 3.3. Glucose slightly up at 102 BUN of 13 creatinine elevated at 1.26 sodium 139 potassium 3.9 chloride 99 CO2 of 22 albumin elevated at 4.9 bilirubin 0.8 direct bilirubin 0.21 alkaline phosphatase 107 AST 26 and ALT 30 so clearly that must have been that herbal smooth product that you were on and the other herbals and we can reassess from there. Called pt: she had ketorolac also. Start the ursodiol 300mg po bid with food. She will do that and follow labs.  Started atorvastatin since dec 2020 and she is still on that for now and then to do the crestor,  January 4 local labs sent in. Glucose 159 elevated please share with primary provider.  He had a 16 creatinine 0.96 sodium 138 potassium 4.2 chloride 99 calcium 9.9 total bili 0.4 alkaline phosphatase 202 and . Prior December 29 labs showed alk phos 114 AST 28 and ALT 45 so clearly those numbers bumped. White blood cell count 8.4 hemoglobin 13.2 platelet count 350. Cholesterol 226 elevated.  Triglyceride 284 elevated.   elevated.  Hemoglobin A1c slightly better at 7.9 from 8.1%.  TSH 1.78.  Magnesium 2.2. Called pt: need new labs to see what labs doing.  Dec 29 labs: ast 48 and alt 31 and alk 111 and tb 0.4.  December 13 2021  labs show white blood  cell count elevated 11.9 but down from 12.6.  Hemoglobin 15 and platelets up at 503 and previously 394.  That can sometimes go up with inflammation or medicines.  Please be sure to share this info with primary providers. MCV normal at 90.  Neutrophils elevated 7.4 but down from 10.1 back on December 2.  Lymphocytes normal at 3.1 and monocytes slightly up at 1.0. Glucose elevated at 149 and was previously 114.  Hemoglobin A1c up to 8.1 and was previously 6.3.  You should look at this sugar issue again with your primary provider as that is unusual for you. BUN of 12 creatinine 1.19 which is up from 1.014 sodium 136 potassium 4.2 chloride 98 CO2 20. Calcium at 10.4 and was previously normal at 9.6 and I wonder if it is medicine related? Albumin 4.6 bilirubin 0.8 alkaline phosphatase slightly higher at 130 previously 126. AST down to 32 and ALT down to 49 previously AST 65 and ALT 66. C-reactive protein noted to be normal at 2.  Dec 1 labs much better: Tp 6.9 and alb 4.2 and tb 0.2 and db 0.10 and alk 111 and ast 19 and alt 43 and down from prior alk 301 and ast 77 and alt 171. So doing much better. Keep track of any new meds added or removed.  November 98 labs show 123 which is elevated BUN of 18 creatinine 0.99 sodium 137 potassium 5.1 chloride 99 CO2 of 24 albumin 4.7 bilirubin 0.5 alkaline phosphatase down to 118 from 194.  AST 29 and previously 21 ALT down to 46 from 53.  So clearly centimeters drop. She says she had increased the Seroquel and then being weaned on it now. She is now on 50 mg of Seroquel and she is to stop it tonight and she will redo labs in 1-2 weeks to be sure.  Ca 10.3 little up and she is not on supplements for this and follow course. She will let primary md know.  Oct 12: alb 4.5 and tb 0.6 and db 0.19 and alk 301 and ast 77 and alt 171 and prior ast 33 and alt 52. went to 25 and 50 and now 100mg.  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 Says taking dilaudid and ibuprofen for that. She says taking a dilaudid occ and one ibuprofen. 10-1 labs also off. Still on steroids 10mg po qd for the rheum arthritis so enbrel stopped working and went to this. She did myasthenia screen and neg and started on this and trying to do humira sunday.   10-1 alk 199 and ast 56 and alt 149.   September 21 ultrasound showed the liver to be 15.6 cm with mild hepatic steatosis but no focal mass. Liver vessels are noted to be patent. Prior cholecystectomy changes seen. No bile duct dilation seen with common bile duct 5 mm. Visualized pancreas portions were unremarkable proportions of the head and tail the pancreas were not seen due to gas. Right kidney 10.6 cm and left kidney 10.8 cm with no hydronephrosis. Spleen normal at 8.5 cm. Mild liver fat overall was seen patent vessels were seen. No ascites was seen.  September 21 labs show albumin 4.9 which remains slightly up bilirubin 0.4 which is down from 0.8 direct bilirubin 0.13 which is down from 0.23. Alkaline phosphatase down to 198 from 207 AST slightly up at 33 from 28 ALT 52 down from 66.  September 9 redo labs show albumin stable 4.9 bilirubin 0.8 direct 0.23 alkaline phosphatase slightly lower at 207 from 215. AST down to 28 from 61. ALT down to 66 from 86.  So it does appear the labs are dropping with lamictal drop 150 to 100mg and then went up on seroquel. Sept 7 labs: alb 4.8 and tb 0.7 and db 0.2 and alk 215 and Ast 61 and alt 86.  Prior aug 27 albs ast 18 and alt 28 and alk 135. that was pre lamictal changed.  She had prior drinking green tea for a few weeks. She has an immune issue and so the labs spiked up. then stopped and labs settled last year off that.  Pt was to do a bx and trouble breathing and Dr Self told to do barium swallow and manometry and did the barium and to do the manometry and says told was ok.  Ent saw some plaques and she had bx done.  Pelvic area pain noted and se started with pain and went to er and went [facility] to [facility] and she said cyst in fallopian tube and ruptured and did a full hysterectomy and that was July 28 2021.  Ex  burned her citizenship certificate. She said she applied to get it. 555.00 for that.  Enbrel started earlier for seronegative RA.  She says needed onc re wbc up and asked him to do the lfts and she says part of labs back and she has from aug 8 2021. Ast and alt done and alk 209 and prior 138 and ast 149 and alt prior 34 and alt 231 and 76 july 19. b12 1804 and IGA 68 little low.  May 3:wbc 9.6 hg 13.7 and plat 355 and mcv 93. Neutrophils 6.5. glu 92 and cr 0.95 and na 137 and k 4.3 and ca 9.8 and alb 4.5 and tb 0.3 and alk 177 elevated and ast 30 and alt 70 and tesosterone level 6 normal per chart for your age. Prior alk 277 and ast 126 and alt 315. So came down post that doxycycline exposure.  April 6, 2021 [facility]s at [facility]: pancreas appears normal where seen and liver demonstrates normal size and contour and echogenicity. Liver vessels patent. Gallbladder absent. Common bile duct 4mm. No hydronephrosis seen to kidneys. Spleen 8.4cm. No liver mass seen.  Jan 11: glu 171 elevated and show local md. cr 1.06 and na 137 and k 4.0 and cl 98 and co2 21 and ca 9.7 normal and alb 4.6 and tb 0.7 and alk 88 and ast 13 and alt 16. So liver labs doing very well. A1c 5.9% noted.  Jan 20 2021: ct no cardiomegaly. Clear lung bases. Unremarkable liver. Prior gb surgery changes. Unremarkable spleen. Pancreas and and adrenal glands normal. cyst right kidney. left renal cyst. Mild rectosigmoid wall thickening. Liver vessels patent. No bulk adenopath.  Nov 9 2020 labs: wbc 13. 4hg 14 and plat 453 elevated slightly (150- 450 normal). na 135 k 4.6 and cl 97 and co2 26 and calcium elevated 11.0.  Show to local md: why is calcium up?alb 4.5 and tb 0.6 and alk 165 elevated and ast 22 and alt 36 so back down. chol 336 elevated and trg 307 and ldl 219. Lipids off and not sure if due to recent liver flare or other and may want to redo that as known if liver flared that this can be off. Vit d low 28.8. a1c 5.9%.  She says has had low vit d and she was to start this and she was started on 5000 a day for 4 m and she had been taking some vit d. She says she had stopped it for surgery July and restarted it again.  She says was on doxycycline and said had a prior hx of tcn reaction and filled and she said she may have been more ill from it. No reexposure.  Nov 3 2020 : ast 50 and alt 121 (both down then from 121 and 222). main she is off green tea and the lamictal also off prior. Suspect green tea was immune stimulant reving up your immune system. She has a very active immune system.  Rheum says she has fibromyalgia and doing water therapy.  11-2-20: ast down to 50 and alt 118 and prior 121 and alt 222 . alk 213 still up from 210 and tb down to 0.4 from 0.5.  Oct 26 labs tb 0.5 and alk 210 and ast 121 and alt 222. alk 248 and so now 210 so that is better, and ast 128 and that is now 121 and alt was 248 and now 222 so better and we need to follow.  Oct 19 labs spiked again: alk 198 and ast 128 and alt 248. Tb 0.5. na 136 and k k 4.5. bun 15 and cr 0.94 and glu 112.   Oct 6 ast 16 and alt 25 and alk 111. Prior she was also on abx for 6 weeks and so finished oct 8 or so and so that not the cause.  Oct 6: wbc 11.2 little up, hg 14.4 and hct 42.2, plat 141. Mcv 90, glu 103 little up and maybe not fasting. Bun 20 and cr 0.98 and na 135 and k 4.5, cl 99 and co2 22. Alb 4.2 and tb 0.5 and alk 111 and ast 16 and alt 25.  Prior visit lamictal came down from 75/50 to 50/25 as of oct 9 and added gabapentin 400mg and seroquel 25mg po qhs an atorvastatin 40mg po qhs.  Off the prior nasal exposures also.  Aunt Ewelina passed in oct 2020 had oltx and she had failed from 2nd tx. Not from covid 19.  U.s aug 2020 [facility]: they saw mild fatty infiltration of the liver. Prior gb surgery changes seen. Visualized pancreas portions normal. No liver masses. Common duct 4mm. Portal vein patent. Kidneys unremarkable. Spleen 7.8cm unremarkable. Should get better as time passes from the issues we discussed.  Aug 4 2020 labs: glu 61 and little low bun 17 and cr 0.91 and na 135 and k 5.2 and cl 96 and co2 20 and ca 10.9 elevated and show local md. alb 4.7 and tb 0.6 and alk 104 and ast 24 and alt 28.  She was on ursodiol for has been on it for her liver started july 29 and she did stop it . She is staying off this as last time ok and may need it.  Nov 25 2020: wbc 11.3 and hg 14.1 plat 484 elevated and neutrophils 7.7 and glu 144 elevated and cr 1.08 little up.na 139 and k 4.3 and cl 99 and co2 24 and ca 10 and alb 4,7 and tb 0.3 and alk 130 and ast 15 and alt 18. Addendum: 1. Smoothie immune booster did. 2. Michelle took that. 3. She will confirm medicine.  Has lost 53 pounds as of july 2022 and now at 120 and lost 65 pounds.  Plan: 1.  Need to keep working on the alcohol use and she is doing therapy and she is being seen June 11. 2.  She is controlling meds and follow course., 3.  Pt will do u.s in aug, 4. Plan to see then and see how labs.  Duration of visit was 34  min with 10 cami of prep and loading to ecw and then 24 min by dox video with greater than 50% of time spent reviewing chart and events with the patient and in discussing plans.

## 2023-07-31 ENCOUNTER — WEB ENCOUNTER (OUTPATIENT)
Dept: URBAN - METROPOLITAN AREA CLINIC 86 | Facility: CLINIC | Age: 48
End: 2023-07-31

## 2023-08-01 ENCOUNTER — LAB OUTSIDE AN ENCOUNTER (OUTPATIENT)
Dept: URBAN - METROPOLITAN AREA TELEHEALTH 2 | Facility: TELEHEALTH | Age: 48
End: 2023-08-01

## 2023-08-04 ENCOUNTER — OFFICE VISIT (OUTPATIENT)
Dept: URBAN - METROPOLITAN AREA CLINIC 22 | Facility: CLINIC | Age: 48
End: 2023-08-04

## 2023-08-18 ENCOUNTER — OFFICE VISIT (OUTPATIENT)
Dept: URBAN - METROPOLITAN AREA CLINIC 22 | Facility: CLINIC | Age: 48
End: 2023-08-18

## 2023-08-25 ENCOUNTER — OFFICE VISIT (OUTPATIENT)
Dept: URBAN - METROPOLITAN AREA CLINIC 77 | Facility: CLINIC | Age: 48
End: 2023-08-25

## 2023-08-30 ENCOUNTER — TELEPHONE ENCOUNTER (OUTPATIENT)
Dept: URBAN - METROPOLITAN AREA CLINIC 86 | Facility: CLINIC | Age: 48
End: 2023-08-30

## 2023-08-30 ENCOUNTER — OFFICE VISIT (OUTPATIENT)
Dept: URBAN - METROPOLITAN AREA CLINIC 77 | Facility: CLINIC | Age: 48
End: 2023-08-30
Payer: COMMERCIAL

## 2023-08-30 ENCOUNTER — WEB ENCOUNTER (OUTPATIENT)
Dept: URBAN - METROPOLITAN AREA CLINIC 86 | Facility: CLINIC | Age: 48
End: 2023-08-30

## 2023-08-30 DIAGNOSIS — K75.4 AUTOIMMUNE HEPATITIS: ICD-10-CM

## 2023-08-30 PROCEDURE — 76705 ECHO EXAM OF ABDOMEN: CPT

## 2023-08-30 PROCEDURE — 93975 VASCULAR STUDY: CPT

## 2023-08-30 NOTE — HPI-TODAY'S VISIT:
Dear Chanda Sarmiento, August 30 ultrasound shows liver echogenicity to be normal with no focal lesions. Common bile duct was normal at 3 mm. Prior cholecystectomy changes were seen. No ascites was seen. Visualized pancreas portions appeared unremarkable. Liver vessels were patent. Spleen was normal at 7.4 cm. Right kidney 10.2 cm with no hydronephrosis. In summary you have a normal-appearing liver and patent vessels.  Prior cholecystectomy changes were seen. We await your labs. I have not seen those sent in yet. Dr. Issa

## 2023-09-01 ENCOUNTER — LAB OUTSIDE AN ENCOUNTER (OUTPATIENT)
Dept: URBAN - METROPOLITAN AREA TELEHEALTH 2 | Facility: TELEHEALTH | Age: 48
End: 2023-09-01

## 2023-09-01 ENCOUNTER — OFFICE VISIT (OUTPATIENT)
Dept: URBAN - METROPOLITAN AREA TELEHEALTH 2 | Facility: TELEHEALTH | Age: 48
End: 2023-09-01
Payer: COMMERCIAL

## 2023-09-01 ENCOUNTER — OFFICE VISIT (OUTPATIENT)
Dept: URBAN - METROPOLITAN AREA CLINIC 22 | Facility: CLINIC | Age: 48
End: 2023-09-01

## 2023-09-01 VITALS — HEIGHT: 62 IN | WEIGHT: 110 LBS | BODY MASS INDEX: 20.24 KG/M2

## 2023-09-01 DIAGNOSIS — D80.2 IGA DEFICIENCY: ICD-10-CM

## 2023-09-01 DIAGNOSIS — F19.90 ALCOHOL USE DISORDER: ICD-10-CM

## 2023-09-01 DIAGNOSIS — K71.9 DRUG-INDUCED LIVER DISEASE: ICD-10-CM

## 2023-09-01 DIAGNOSIS — F10.11 ALCOHOL USE DISORDER, MILD, IN EARLY REMISSION, ABUSE: ICD-10-CM

## 2023-09-01 DIAGNOSIS — Z71.6 TOBACCO ABUSE COUNSELING: ICD-10-CM

## 2023-09-01 DIAGNOSIS — K75.4 AUTOIMMUNE HEPATITIS: ICD-10-CM

## 2023-09-01 DIAGNOSIS — Z71.89 VACCINE COUNSELING: ICD-10-CM

## 2023-09-01 DIAGNOSIS — R74.8 ABNORMAL LIVER ENZYMES: ICD-10-CM

## 2023-09-01 DIAGNOSIS — G44.009: ICD-10-CM

## 2023-09-01 DIAGNOSIS — Z98.890 HISTORY OF FUNDOPLICATION: ICD-10-CM

## 2023-09-01 DIAGNOSIS — E66.3 OVERWEIGHT: ICD-10-CM

## 2023-09-01 DIAGNOSIS — Z79.899 HIGH RISK MEDICATION USE: ICD-10-CM

## 2023-09-01 PROBLEM — 406506008: Status: ACTIVE | Noted: 2023-09-01

## 2023-09-01 PROCEDURE — 99214 OFFICE O/P EST MOD 30 MIN: CPT

## 2023-09-01 RX ORDER — URSODIOL 300 MG/1
1 CAPSULE CAPSULE ORAL
Qty: 270 | Refills: 1 | Status: ACTIVE | COMMUNITY

## 2023-09-01 RX ORDER — ATOMOXETINE HYDROCHLORIDE 60 MG/1
1 CAPSULE IN THE MORNING CAPSULE ORAL ONCE A DAY
Status: ACTIVE | COMMUNITY

## 2023-09-01 RX ORDER — LISDEXAMFETAMINE DIMESYLATE 30 MG/1
1 CAPSULE IN THE MORNING CAPSULE ORAL ONCE A DAY
Status: DISCONTINUED | COMMUNITY

## 2023-09-01 RX ORDER — PREDNISONE 2.5 MG/1
1 TABLET TABLET ORAL ONCE A DAY
Status: DISCONTINUED | COMMUNITY

## 2023-09-01 RX ORDER — URSODIOL 300 MG/1
1 CAPSULE CAPSULE ORAL
Qty: 60 | Refills: 1

## 2023-09-01 RX ORDER — ZIPRASIDONE HYDROCHLORIDE 60 MG/1
1 CAPSULE WITH FOOD CAPSULE ORAL ONCE A DAY
Status: ACTIVE | COMMUNITY

## 2023-09-01 RX ORDER — ERGOCALCIFEROL 1.25 MG/1
1 CAPSULE CAPSULE, LIQUID FILLED ORAL
Status: DISCONTINUED | COMMUNITY

## 2023-09-01 RX ORDER — ESCITALOPRAM OXALATE 10 MG/1
3 TABLET ORAL ONCE A DAY
Status: ACTIVE | COMMUNITY

## 2023-09-01 RX ORDER — BUDESONIDE, GLYCOPYRROLATE, AND FORMOTEROL FUMARATE 160; 9; 4.8 UG/1; UG/1; UG/1
2 PUFFS AEROSOL, METERED RESPIRATORY (INHALATION) TWICE A DAY
Status: ACTIVE | COMMUNITY

## 2023-09-01 RX ORDER — PROMETHAZINE HYDROCHLORIDE 25 MG/1
1 TABLET AS NEEDED TABLET ORAL
Status: DISCONTINUED | COMMUNITY

## 2023-09-01 RX ORDER — MIRTAZAPINE 15 MG/1
1 TABLET AT BEDTIME TABLET, FILM COATED ORAL ONCE A DAY
Status: DISCONTINUED | COMMUNITY

## 2023-09-01 NOTE — HPI-TODAY'S VISIT:
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  August 30 ultrasound shows liver echogenicity to be normal with no focal lesions. Common bile duct was normal at 3 mm. Prior cholecystectomy changes were seen. No ascites was seen. Visualized pancreas portions appeared unremarkable. Liver vessels were patent. Spleen was normal at 7.4 cm. Right kidney 10.2 cm with no hydronephrosis. In summary you have a normal-appearing liver and patent vessels.  Prior cholecystectomy changes were seen.  You mentioned she justy left rehab x 28 days and she says sent to [facility].  July 22: last ones: ast 24 and alt 20 and alk 172 up and hg 16.6 cr 0.64. She [facility] to [facility].  She has been off the alcohol now 41 day.  Labs may be better now as further than the last labs.  Pt last visit had beenm having some alcohol but she has cut back some and she is following with therapy and having 8 ounces and day and if have more than 2-3 drinks a day at risk for cirrhosis,  May 11 2023 glu 70 and and bun 11 and cr 0.68 and na 136 and k 4 and cl 99 and co2 26 and ca 10.3 and alb 4.6 and tb 1.1 and alk 157 and ast 34 and alt 60 and mg 2.1 and tsh 1.10 and wbc 9.3 and hg 15.9 and hct 45.5 and platelet 362, mcv 93.4  neutrophils 6371 and lymphs 2018.b432 and folate 14 vit d 78 and ethyl alcohol and over [id number].  Re meds: none in rehab and stratera 60mg qd and 40mg escitalopram and geodon 60mg po qd.  February 6 showed that the pancreas was unremarkable, the liver was homogeneous/even in echotexture with no discrete lesions. Liver vessels were patent. Common bile duct was normal at 2 mm.  Right kidney was 10.8 cm with no hydronephrosis. Spleen was normal at 7.9 cm with splenic vessels patent.  Pt says she was drinking more in 2020 and not until this past year. 6m of this,  Smoking 1/2 ppd and need to work on this.  She says mother is doing study with [facility]. Doing a clinical study for her lungs and for chronic bronchitis.  She says she had labs done last week. Rheum saw.  2-7-2023 and wbc 8.6 hg 15 and plat 387 and neutrophils 4.9 and lymph 2.7 and gly 89 and bun 11 and cr 0.85 and na 140 and k 4.2 and cl 99 and co2 24 and alb 4.7 and tb 0.9 and alk 161 and ast 25 and alt 29.  Alcohol could not sense with the vyvance.  She was changed from adderall to vyvanse and maybe for 3m. She is on ursodiol 300mg po tid.  9/14/22 labs were sent to us. White blood cell count 10.8, hemoglobin 13.2, MCV 95, platelets 337, lymphocytes 2.3, neutrophils 7.3 which is slightly elevated please make sure your primary care doctor is aware. Glucose 98, creatinine 0.65, sodium 142, potassium 4.1, bilirubin 0.5, alkaline phosphatase 146, AST 17, ALT 35. The alkaline phosphatase and the AST are trending in the right direction. Slight increase in the alt, previousl it was 31. We will continue to monitor this. Also of note the protein was slightly low at 5.9 , please share with your primary care.  Aug 5: glu 93 and bun 6 and cr 0.92 and na 141 and k 4.3 and cl 102 and co2 23. Wbc 10.4 and hg 14.6 and hct 44.3 and plat 404.  Mcv 95. Neutrophils 7.3 little up and prior normal 6.8. Lymphs 2.1. Alb 4.4 and tb 0.8 and db 0.22 and alk 150 ast 27 and alt 23.  Prior June 27: alk 183 and ast 18 and alt 31. So labs trending right way with some variation in ast and alt but still in normal range for ast and alt. No clear acute issues.  We will contact [facility] for the u.s done feb 3.  Aug 5  ultrasound shows liver normal in contour and echogenicity.   No suspicious liver lesions. Common bile duct was normal at 2.4 mm and gallbladder absent. Right kidney 9.6 cm with no hydronephrosis. Spleen normal at 8.6 cm. Imaged pancreas portions unremarkable with tail partially not well seen due to gas. Liver vessels patent. Overall good to see that there was no focal lesions and no suspicion for any fibrotic morphology.  She started to drink again and she is having like tequila and has 1 ounce and doing 6 ounces on a rare day and 3 ounces a day. Started 1 month ago.  Pt says labs in Jan 2022 were up and she was ordered a medicine that had diclofenac and Geodon in Jan. She is stil on the geodon at 40mg.  June 27 labs show glucose normal at 86 BUN of 10 creatinine 0.93 down from 0.94.  Sodium 141 potassium 3.9 chloride 103 CO2 24. White blood count still up at 11 but down from 13.5 with.  Hemoglobin 13.2 which is stable.  Platelet count normal at 363 from 411.  MCV normal at 92.  Neutrophils normal at 6.8 and lymphocytes 3.0. Albumin 4.1 bilirubin 1.1 direct bilirubin 0.3.  Alk phos down to 183 from 213.  AST down to 18 and ALT down to 31 from previous AST 17 and ALT 35.  Ideal ALT is less than 25.   June 8 labs showed glucose elevated at 123 and previously was normal but has occasionally been elevated as back in May 3 it was 108.  Please share with primary provider.  Creatinine down to 0.94 from 1.15 so doing better there.  Sodium 138 potassium 4.2 chloride 103 CO2 of 22. White blood  cell count up at 13.5 from 10.3.  Were you ill recently?  Hemoglobin 13.2 platelet count down to normal at 411 from 452.  Neutrophils up at 10.8 and again wonder if he had a recent infection or vaccine?  Lymphocytes 2.1 normal. Albumin 4.5 bilirubin 0.6 which is stable direct bilirubin 0.2.  Alkaline phosphatase slightly lowered to 13 from 220 AST low at 17 from 21 previously 38.  ALT still slightly up at 35 but previously 35 and back on May 3 have been 65. Overall liver labs are dropping but slowing down on that. Any new meds? Maybe role of recent illness?  May 19 labs show glucose down to 97 from 108 before.  BUN of 10.  Creatinine still remains slightly up at 1.15 from 1.13.  He to stay hydrated with the increasing temperatures. Sodium 135 potassium 4.7 chloride 96 CO2 of 21. White blood cell count 10.3 hemoglobin 14.1 platelet count slightly up at 452 and was previously 430.  MCV 92.  Neutrophils 6.4 lymphocytes 2.7 normal. Albumin 4.6 bilirubin 0.6 which is down from 1.0 and prior 1.4.  Direct bilirubin 0.21.  Alk phos remains at 220 was previously 220 and prior 180.  AST down to 21 and previously 38.  ALT down to 35 from previously 65.  May 3 labs show glucose slightly up at 108 and may be not fasting that day?  BUN of 12 creatinine elevated at 1.13 previously 1.06.  Sodium 139 potassium 4.5 chloride 101 CO2 21.  The chloride and CO2 are back to normal which they were off last time. White blood cell count down to 10 hemoglobin 14.8 platelet count 430 MCV 90 neutrophils normal at 6.5 lymphocytes normal now at 2.5. Albumin 4.7 also down from 5.0.  Bilirubin normal now at 1.0 alkaline phosphatase still elevated though at 220 previously was 180.  AST was 38 and ALT 65.  Previously AST 27 and ALT 44. Called pt and no new meds. Did drop citalopram to 10mg po qd and she is now off. Soumya 300mg po tid for 2 weeks or so. Asked if gaining weight and she is not. Smoking a lot of cigarettes and I have seen that before but not common to see. She will work on this and see how she does. She will try to work on that. Redo the labs in 2 weeks.  She stopped her lisinopril and changed to amlodipine and did not start as bp is low. 95/71.  January 19, 2022 ultrasound sent in. Liver appeared to be increased abd coarsened in regards to echogenicity.  No definite liver lesions.  No dilated ducts. Normal hepatopetal flow seen in the main portal vein.  Imaged hepatic veins also were patent. Common bile duct normal at 5 mm. Gallbladder absent. Right kidney 10.6 cm with no hydronephrosis. Left kidney 10.5 cm with no hydronephrosis.  Spleen normal at 8.7 cm. They suspected that the liver could have moderate diffuse fatty infiltration.  I would state that this was also at the time that you are having a medication effect so it could be that threw that reading off.     April 4, 2022 labs show glucose 93 which is normal BUN of 9 creatinine slightly up at 1.05 and was previously 1.01. May want to look at your hydrational state. Sodium 139 potassium 4.7 chloride 99 CO2 of 22. Albumin 4.8 bilirubin 1.2 which is normal and direct bilirubin 0.28 suggesting that you have Gilbert's syndrome or that indirect hyperbilirubinemia that is benign and present about 10% of people. Alkaline phosphatase down to 187 from 202 and down previously from 230.  AST of 24 down from 36 and prior 49. ALT 33 down from 41 and ALT 76.  She had a prior bump in lfts from working on epoxys.  Making cups.  She also asked re asa and crestor and  she has not started the aspirin yet and she was to start to crestor and she will start post the next labs.  March 21 labs show glucose still slightly up at 104 previously 106.  BUN of 7 and creatinine down to 1.01 from 1.19 so that was better.  Sodium 136 potassium 4.2 chloride 101 CO2 slightly low at 18. Total bilirubin normal at 1.1 previously 1.2 direct bilirubin 0.29 and I would suggest that you have Gilbert's.  Gilbert's as you recall is that indirect hyperbilirubinemia that is very common and present about 10% of people. The alkaline phosphatase is down to 202 from 230 and the AST is down to 36 from 49 and ALT 41 from 76.  It would appear that you change something? White blood cell count 9.7 hemoglobin 14.1 platelet count 444 down from 499. Important to note the white cell count also came down from 13.4 to 9.7.  Neutrophils 6.8 and lymphocytes 2.1 both normal. Called pt re the labs. She says did not change meds. 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   March 9 labs show white blood cell count 13.4 up from 11.7 and prior 10.7.  Hemoglobin 14.6.  Platelet count up a little at 499 from 445.  It has been variable before though.  MCV normal at 90.  Neutrophils up at 9.5 and lymphocytes 2.5 down from last time. Glucose 106 slightly up and BUN of 9 creatinine slightly higher at 1.9 and previously 1.15.   Sodium 137 potassium 4.1 chloride 100 CO2 slightly low at 19.  Albumin 4.0.  Total bilirubin 1.2 normal.  Direct bilirubin 0.35.  Alkaline phosphatase up to 233.  AST 49 and ALT 76 and previously AST 29 ALT 40. Called pt as labs trending up. Meds: propanol, furosemide, spirinolactone, basagalar, Spiriva and Symbicort, Geodon and lamotrigine. Ursodiol. Lamotrigine 1% risk for incl fts and on for a year. Geodon: category c for liver injury and occurs within 1-4 weeks and she has been on it for 6-8 weeks. She will discuss with psych re this.   March 3 labs show liver alkaline phosphatase fraction is 76% bone fraction is 24% and intestinal 0%  so clearly the alkaline phosphatase is coming mainly from liver.  This is the normal pattern that one would expect. March 3 labs also show albumin slightly up at 4.9 from 4.7.  Could reflect hydrational status. Total bilirubin normal at 1.2 and direct bilirubin 0.32 suggesting a Gilbert's syndrome which is a benign finding. Alkaline phosphatase remains elevated at 203 and previously was 175.  AST 29 and ALT 40 and previously AST 20 and ALT 34. Unfortunately these labs arrived very late so we need to go over with you what medicines you are on and since when.  They are not dramatically worse but they are definitely drifting up.  February 24 labs show white blood cell count elevated at 11.7 previously was 10.7.  Hemoglobin 14.5 normal platelet count 445 down from 468.  MCV 92.  Neutrophils elevated at 7.1 and lymphocytes 3.4.  Have you been ill?  Glucose 107 BUN of 12 creatinine 1.15 down from 1.26.  Normal is from 0.57 up to 1.0 so still elevated.  Sodium 137 k 3.8 chloride 98 CO2 of 22.  Albumin 4.7 normal now.  Limit 0.8 direct bilirubin 0.18 and alkaline phosphatase elevated at 175.  Previously 107.  AST 20 and ALT 34.  Previously AST 26 and ALT 30.  Need to watch this evolving alkaline phosphatase trend. Called pt: she had surgery on the right elbow and so this could be from bone and had moved ulnar nerve. She also is on some prednisone and she is on prednisone 2.5mg po qd. We need to fractionate the alkaline phosphatase. We can redo the labs next week to check on that.  February 2 labs show white blood cell count 10.7 hemoglobin 14.4 platelet count up at 468.  MCV was 90 which is normal.  Neutrophils 6.3 and lymphocytes elevated at 3.3. Glucose slightly up at 102 BUN of 13 creatinine elevated at 1.26 sodium 139 potassium 3.9 chloride 99 CO2 of 22 albumin elevated at 4.9 bilirubin 0.8 direct bilirubin 0.21 alkaline phosphatase 107 AST 26 and ALT 30 so clearly that must have been that herbal smooth product that you were on and the other herbals and we can reassess from there. Called pt: she had ketorolac also. Start the ursodiol 300mg po bid with food. She will do that and follow labs.  Started atorvastatin since dec 2020 and she is still on that for now and then to do the crestor,  January 4 local labs sent in. Glucose 159 elevated please share with primary provider.  He had a 16 creatinine 0.96 sodium 138 potassium 4.2 chloride 99 calcium 9.9 total bili 0.4 alkaline phosphatase 202 and . Prior December 29 labs showed alk phos 114 AST 28 and ALT 45 so clearly those numbers bumped. White blood cell count 8.4 hemoglobin 13.2 platelet count 350. Cholesterol 226 elevated.  Triglyceride 284 elevated.   elevated.  Hemoglobin A1c slightly better at 7.9 from 8.1%.  TSH 1.78.  Magnesium 2.2. Called pt: need new labs to see what labs doing.  Dec 29 labs: ast 48 and alt 31 and alk 111 and tb 0.4.  December 13 2021  labs show white blood  cell count elevated 11.9 but down from 12.6.  Hemoglobin 15 and platelets up at 503 and previously 394.  That can sometimes go up with inflammation or medicines.  Please be sure to share this info with primary providers. MCV normal at 90.  Neutrophils elevated 7.4 but down from 10.1 back on December 2.  Lymphocytes normal at 3.1 and monocytes slightly up at 1.0. Glucose elevated at 149 and was previously 114.  Hemoglobin A1c up to 8.1 and was previously 6.3.  You should look at this sugar issue again with your primary provider as that is unusual for you. BUN of 12 creatinine 1.19 which is up from 1.014 sodium 136 potassium 4.2 chloride 98 CO2 20. Calcium at 10.4 and was previously normal at 9.6 and I wonder if it is medicine related? Albumin 4.6 bilirubin 0.8 alkaline phosphatase slightly higher at 130 previously 126. AST down to 32 and ALT down to 49 previously AST 65 and ALT 66. C-reactive protein noted to be normal at 2.  Dec 1 labs much better: Tp 6.9 and alb 4.2 and tb 0.2 and db 0.10 and alk 111 and ast 19 and alt 43 and down from prior alk 301 and ast 77 and alt 171. So doing much better. Keep track of any new meds added or removed.  November 98 labs show 123 which is elevated BUN of 18 creatinine 0.99 sodium 137 potassium 5.1 chloride 99 CO2 of 24 albumin 4.7 bilirubin 0.5 alkaline phosphatase down to 118 from 194.  AST 29 and previously 21 ALT down to 46 from 53.  So clearly centimeters drop. She says she had increased the Seroquel and then being weaned on it now. She is now on 50 mg of Seroquel and she is to stop it tonight and she will redo labs in 1-2 weeks to be sure.  Ca 10.3 little up and she is not on supplements for this and follow course. She will let primary md know.  Oct 12: alb 4.5 and tb 0.6 and db 0.19 and alk 301 and ast 77 and alt 171 and prior ast 33 and alt 52. went to 25 and 50 and now 100mg.  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 Says taking dilaudid and ibuprofen for that. She says taking a dilaudid occ and one ibuprofen. 10-1 labs also off. Still on steroids 10mg po qd for the rheum arthritis so enbrel stopped working and went to this. She did myasthenia screen and neg and started on this and trying to do humira sunday.   10-1 alk 199 and ast 56 and alt 149.   September 21 ultrasound showed the liver to be 15.6 cm with mild hepatic steatosis but no focal mass. Liver vessels are noted to be patent. Prior cholecystectomy changes seen. No bile duct dilation seen with common bile duct 5 mm. Visualized pancreas portions were unremarkable proportions of the head and tail the pancreas were not seen due to gas. Right kidney 10.6 cm and left kidney 10.8 cm with no hydronephrosis. Spleen normal at 8.5 cm. Mild liver fat overall was seen patent vessels were seen. No ascites was seen.  September 21 labs show albumin 4.9 which remains slightly up bilirubin 0.4 which is down from 0.8 direct bilirubin 0.13 which is down from 0.23. Alkaline phosphatase down to 198 from 207 AST slightly up at 33 from 28 ALT 52 down from 66.  September 9 redo labs show albumin stable 4.9 bilirubin 0.8 direct 0.23 alkaline phosphatase slightly lower at 207 from 215. AST down to 28 from 61. ALT down to 66 from 86.  So it does appear the labs are dropping with lamictal drop 150 to 100mg and then went up on seroquel. Sept 7 labs: alb 4.8 and tb 0.7 and db 0.2 and alk 215 and Ast 61 and alt 86.  Prior aug 27 albs ast 18 and alt 28 and alk 135. that was pre lamictal changed.  She had prior drinking green tea for a few weeks. She has an immune issue and so the labs spiked up. then stopped and labs settled last year off that.  Pt was to do a bx and trouble breathing and Dr Self told to do barium swallow and manometry and did the barium and to do the manometry and says told was ok.  Ent saw some plaques and she had bx done.  Pelvic area pain noted and se started with pain and went to er and went [facility] to [facility] and she said cyst in fallopian tube and ruptured and did a full hysterectomy and that was July 28 2021.  Ex  burned her citizenship certificate. She said she applied to get it. [phone] for that.  Enbrel started earlier for seronegative RA.  She says needed onc re wbc up and asked him to do the lfts and she says part of labs back and she has from aug 8 2021. Ast and alt done and alk 209 and prior 138 and ast 149 and alt prior 34 and alt 231 and 76 july 19. b12 1804 and IGA 68 little low.  May 3:wbc 9.6 hg 13.7 and plat 355 and mcv 93. Neutrophils 6.5. glu 92 and cr 0.95 and na 137 and k 4.3 and ca 9.8 and alb 4.5 and tb 0.3 and alk 177 elevated and ast 30 and alt 70 and tesosterone level 6 normal per chart for your age. Prior alk 277 and ast 126 and alt 315. So came down post that doxycycline exposure.  April 6, 2021 [facility] at [facility]: pancreas appears normal where seen and liver demonstrates normal size and contour and echogenicity. Liver vessels patent. Gallbladder absent. Common bile duct 4mm. No hydronephrosis seen to kidneys. Spleen 8.4cm. No liver mass seen.  Jan 11: glu 171 elevated and show local md. cr 1.06 and na 137 and k 4.0 and cl 98 and co2 21 and ca 9.7 normal and alb 4.6 and tb 0.7 and alk 88 and ast 13 and alt 16. So liver labs doing very well. A1c 5.9% noted.  Jan 20 2021: ct no cardiomegaly. Clear lung bases. Unremarkable liver. Prior gb surgery changes. Unremarkable spleen. Pancreas and and adrenal glands normal. cyst right kidney. left renal cyst. Mild rectosigmoid wall thickening. Liver vessels patent. No bulk adenopath.  Nov 9 2020 labs: wbc 13. 4hg 14 and plat 453 elevated slightly (150- 450 normal). na 135 k 4.6 and cl 97 and co2 26 and calcium elevated 11.0.  Show to local md: why is calcium up?alb 4.5 and tb 0.6 and alk 165 elevated and ast 22 and alt 36 so back down. chol 336 elevated and trg 307 and ldl 219. Lipids off and not sure if due to recent liver flare or other and may want to redo that as known if liver flared that this can be off. Vit d low 28.8. a1c 5.9%.  She says has had low vit d and she was to start this and she was started on 5000 a day for 4 m and she had been taking some vit d. She says she had stopped it for surgery July and restarted it again.  She says was on doxycycline and said had a prior hx of tcn reaction and filled and she said she may have been more ill from it. No reexposure.  Nov 3 2020 : ast 50 and alt 121 (both down then from 121 and 222). main she is off green tea and the lamictal also off prior. Suspect green tea was immune stimulant reving up your immune system. She has a very active immune system.  Rheum says she has fibromyalgia and doing water therapy.  11-2-20: ast down to 50 and alt 118 and prior 121 and alt 222 . alk 213 still up from 210 and tb down to 0.4 from 0.5.  Oct 26 labs tb 0.5 and alk 210 and ast 121 and alt 222. alk 248 and so now 210 so that is better, and ast 128 and that is now 121 and alt was 248 and now 222 so better and we need to follow.  Oct 19 labs spiked again: alk 198 and ast 128 and alt 248. Tb 0.5. na 136 and k k 4.5. bun 15 and cr 0.94 and glu 112.   Oct 6 ast 16 and alt 25 and alk 111. Prior she was also on abx for 6 weeks and so finished oct 8 or so and so that not the cause.  Oct 6: wbc 11.2 little up, hg 14.4 and hct 42.2, plat 141. Mcv 90, glu 103 little up and maybe not fasting. Bun 20 and cr 0.98 and na 135 and k 4.5, cl 99 and co2 22. Alb 4.2 and tb 0.5 and alk 111 and ast 16 and alt 25.  Prior visit lamictal came down from 75/50 to 50/25 as of oct 9 and added gabapentin 400mg and seroquel 25mg po qhs an atorvastatin 40mg po qhs.  Off the prior nasal exposures also.  Aunt Ewelina passed in oct 2020 had oltx and she had failed from 2nd tx. Not from covid 19.  U.s aug 2020 [facility]: they saw mild fatty infiltration of the liver. Prior gb surgery changes seen. Visualized pancreas portions normal. No liver masses. Common duct 4mm. Portal vein patent. Kidneys unremarkable. Spleen 7.8cm unremarkable. Should get better as time passes from the issues we discussed.  Aug 4 2020 labs: glu 61 and little low bun 17 and cr 0.91 and na 135 and k 5.2 and cl 96 and co2 20 and ca 10.9 elevated and show local md. alb 4.7 and tb 0.6 and alk 104 and ast 24 and alt 28.  She was on ursodiol for has been on it for her liver started july 29 and she did stop it . She is staying off this as last time ok and may need it.  Nov 25 2020: wbc 11.3 and hg 14.1 plat 484 elevated and neutrophils 7.7 and glu 144 elevated and cr 1.08 little up.na 139 and k 4.3 and cl 99 and co2 24 and ca 10 and alb 4,7 and tb 0.3 and alk 130 and ast 15 and alt 18. Addendum: 1. Smoothie immune booster did. 2. Michelle took that. 3. She will confirm medicine.  Last time had been down 120 and now 110 in aug.   Plan: 1.  Complimented her on her sobriety of 41 days. 2. JUly labs encouraging but the alk phos up. 3. Started new meds and need to see how doing. 4. U.s being normal is doing better.    Duration of visit was 30  min with 10 min of prep and loading to ecw and then 20 min by milton video with greater than 50% of time spent reviewing chart and events with the patient and in discussing plans.

## 2023-09-03 ENCOUNTER — TELEPHONE ENCOUNTER (OUTPATIENT)
Dept: URBAN - METROPOLITAN AREA CLINIC 86 | Facility: CLINIC | Age: 48
End: 2023-09-03

## 2023-09-03 NOTE — HPI-TODAY'S VISIT:
Dear Chanda Sarmiento,  Full labs from July 22 sent in.  You read me the essential ones but the full labs showed glucose 72 BUN of 8 creatinine 0.64 sodium 137 potassium 4.2 chloride 106 CO2 of 22 BUN 4.4 bilirubin 0.7 alk phos elevated at 172 AST 24 and ALT 20.  Back in May your alk phos of been 157 so that appears to be a little higher. TSH 0.76. WBC 7.1, hemoglobin up at 16.6 hematocrit up at 50.8 plate count 436 elevated MCV 99.  Neutrophils 3479 lymphocytes 2918.  Hep B core total negative.  RPR negative.  HIV test negative.  Hep B surface antigen negative.  Hep C antibody negative.  July 22 drug  screen had been positive for alcohol metabolites, amphetamine, and marijuana. We are very happy to hear that you are moving in the right direction now on these issues and look forward to seeing the next labs be better. As we spoke about at that visit since these labs were back in July, repeating the labs now would be my first recommendation and then let's see what they show from there. Dr. Issa.

## 2023-09-25 ENCOUNTER — TELEPHONE ENCOUNTER (OUTPATIENT)
Dept: URBAN - METROPOLITAN AREA CLINIC 86 | Facility: CLINIC | Age: 48
End: 2023-09-25

## 2023-09-25 NOTE — HPI-TODAY'S VISIT:
Dear Chanda Sarmiento, Local labs from September 18 sent in. Glucose was slightly up at 102 BUN of 14 creatinine 0.86 sodium 140 potassium 4.7 calcium 10.2 albumin 4.4 bilirubin 0.4 alk phos elevated at 172 and back in September 11 of been 167. Your AST was higher at 23 and ALT higher at 35 and previous in September 11 your AST was 13 and ALT of 17. Did you change any medicine?  Sed rate was noted to be 2 and C-reactive protein was 1 and PTH was 33. September 11 labs show WBC 7.7 hemoglobin elevated 16.7 plate count 423 MCV 93 with a BUN of 9 creatinine 0.96 sodium 142 potassium 5.2 elevated calcium of 10.6.  AST then again 13 and ALT of 17 alk phos 167. Cholesterol was up at 270 and triglycerides were up at 378 and HDL 48 and  so those were definitely off. INR normal at 1.0.  B12 normal at 637 and folate normal at 9.9.  Hemoglobin A1c slightly up at 5.8 so you need to watch that as you may be moving into prediabetes.  TSH 1.67.  Uric acid was 3.2. For comparison back in February your AST was 25 and ALT 29 alk phos 161. Very important to see what are you doing differently between September 11 and the September 18 labs. Please let us know. Dr. Issa

## 2023-09-29 ENCOUNTER — LAB OUTSIDE AN ENCOUNTER (OUTPATIENT)
Dept: URBAN - METROPOLITAN AREA TELEHEALTH 2 | Facility: TELEHEALTH | Age: 48
End: 2023-09-29

## 2023-10-04 ENCOUNTER — WEB ENCOUNTER (OUTPATIENT)
Dept: URBAN - METROPOLITAN AREA CLINIC 86 | Facility: CLINIC | Age: 48
End: 2023-10-04

## 2023-12-01 ENCOUNTER — LAB OUTSIDE AN ENCOUNTER (OUTPATIENT)
Dept: URBAN - METROPOLITAN AREA TELEHEALTH 2 | Facility: TELEHEALTH | Age: 48
End: 2023-12-01

## 2023-12-08 ENCOUNTER — OFFICE VISIT (OUTPATIENT)
Dept: URBAN - METROPOLITAN AREA TELEHEALTH 2 | Facility: TELEHEALTH | Age: 48
End: 2023-12-08
Payer: COMMERCIAL

## 2023-12-08 VITALS — HEIGHT: 62 IN | WEIGHT: 110 LBS | BODY MASS INDEX: 20.24 KG/M2

## 2023-12-08 DIAGNOSIS — K71.9 DRUG-INDUCED LIVER DISEASE: ICD-10-CM

## 2023-12-08 DIAGNOSIS — F10.11 ALCOHOL USE DISORDER, MILD, IN EARLY REMISSION, ABUSE: ICD-10-CM

## 2023-12-08 DIAGNOSIS — Z79.899 HIGH RISK MEDICATION USE: ICD-10-CM

## 2023-12-08 DIAGNOSIS — K75.4 AUTOIMMUNE HEPATITIS: ICD-10-CM

## 2023-12-08 PROCEDURE — 99214 OFFICE O/P EST MOD 30 MIN: CPT

## 2023-12-08 RX ORDER — ESCITALOPRAM OXALATE 10 MG/1
3 TABLET ORAL ONCE A DAY
Status: DISCONTINUED | COMMUNITY

## 2023-12-08 RX ORDER — BUDESONIDE, GLYCOPYRROLATE, AND FORMOTEROL FUMARATE 160; 9; 4.8 UG/1; UG/1; UG/1
2 PUFFS AEROSOL, METERED RESPIRATORY (INHALATION) TWICE A DAY
Status: ACTIVE | COMMUNITY

## 2023-12-08 RX ORDER — ATORVASTATIN CALCIUM 20 MG/1
1 TABLET TABLET, FILM COATED ORAL ONCE A DAY
Status: ACTIVE | COMMUNITY

## 2023-12-08 RX ORDER — VARENICLINE 0.03 MG/.05ML
1 SPRAY IN EACH NOSTRIL SPRAY NASAL TWICE A DAY
Status: ACTIVE | COMMUNITY

## 2023-12-08 RX ORDER — ATOMOXETINE HYDROCHLORIDE 60 MG/1
1 CAPSULE IN THE MORNING CAPSULE ORAL ONCE A DAY
Status: DISCONTINUED | COMMUNITY

## 2023-12-08 RX ORDER — URSODIOL 300 MG/1
1 CAPSULE CAPSULE ORAL
Qty: 60 | Refills: 1 | Status: ACTIVE | COMMUNITY

## 2023-12-08 RX ORDER — VORTIOXETINE 20 MG/1
1 TABLET TABLET, FILM COATED ORAL ONCE A DAY
Status: ACTIVE | COMMUNITY

## 2023-12-08 RX ORDER — ZIPRASIDONE HYDROCHLORIDE 60 MG/1
1 CAPSULE WITH FOOD CAPSULE ORAL ONCE A DAY
Status: ACTIVE | COMMUNITY

## 2023-12-08 RX ORDER — LISDEXAMFETAMINE DIMESYLATE 60 MG/1
1 TABLET IN THE MORNING TABLET, CHEWABLE ORAL ONCE A DAY
Status: ACTIVE | COMMUNITY

## 2023-12-08 NOTE — HPI-TODAY'S VISIT:
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  She says she did labs 1m ago [facility].  She says labs were stable.  September 18 sent in. Glucose was slightly up at 102 BUN of 14 creatinine 0.86 sodium 140 potassium 4.7 calcium 10.2 albumin 4.4 bilirubin 0.4 alk phos elevated at 172 and back in September 11 of been 167. Your AST was higher at 23 and ALT higher at 35 and previous in September 11 your AST was 13 and ALT of 17. Sed rate was noted to be 2 and C-reactive protein was 1 and PTH was 33. September 11 labs show WBC 7.7 hemoglobin elevated 16.7 plate count 423 MCV 93 with a BUN of 9 creatinine 0.96 sodium 142 potassium 5.2 elevated calcium of 10.6. AST then again 13 and ALT of 17 alk phos 167. Cholesterol was up at 270 and triglycerides were up at 378 and HDL 48 and  so those were definitely off. INR normal at 1.0. B12 normal at 637 and folate normal at 9.9. Hemoglobin A1c slightly up at 5.8 so you need to watch that as you may be moving into prediabetes. TSH 1.67. Uric acid was 3.2. For comparison back in February your AST was 25 and ALT 29 alk phos 161. Very important to see what are you doing differently between September 11 and the September 18 labs.  Full labs from July 22 sent in. You read me the essential ones but the full labs showed glucose 72 BUN of 8 creatinine 0.64 sodium 137 potassium 4.2 chloride 106 CO2 of 22 BUN 4.4 bilirubin 0.7 alk phos elevated at 172 AST 24 and ALT 20. Back in May your alk phos of been 157 so that appears to be a little higher. TSH 0.76. WBC 7.1, hemoglobin up at 16.6 hematocrit up at 50.8 plate count 436 elevated MCV 99. Neutrophils 3479 lymphocytes 2918. Hep B core total negative. RPR negative. HIV test negative. Hep B surface antigen negative. Hep C antibody negative. July 22 drug screen had been positive for alcohol metabolites, amphetamine, and marijuana.  She says she did well on therapy and she has done program and staying off the alcohol.  August 30 ultrasound shows liver echogenicity to be normal with no focal lesions. Common bile duct was normal at 3 mm. Prior cholecystectomy changes were seen. No ascites was seen. Visualized pancreas portions appeared unremarkable. Liver vessels were patent. Spleen was normal at 7.4 cm. Right kidney 10.2 cm with no hydronephrosis. In summary you have a normal-appearing liver and patent vessels.  Prior cholecystectomy changes were seen.  You mentioned she justy left rehab x 28 days and she says sent to [facility].  July 22: last ones: ast 24 and alt 20 and alk 172 up and hg 16.6 cr 0.64. She sent to [facility].  She has been off the alcohol now 41 day.  Labs may be better now as further than the last labs.  Pt last visit had beenm having some alcohol but she has cut back some and she is following with therapy and having 8 ounces and day and if have more than 2-3 drinks a day at risk for cirrhosis,  May 11 2023 glu 70 and and bun 11 and cr 0.68 and na 136 and k 4 and cl 99 and co2 26 and ca 10.3 and alb 4.6 and tb 1.1 and alk 157 and ast 34 and alt 60 and mg 2.1 and tsh 1.10 and wbc 9.3 and hg 15.9 and hct 45.5 and platelet 362, mcv 93.4  neutrophils 6371 and lymphs 2018.b432 and folate 14 vit d 78 and ethyl alcohol and over [id number].  Re meds: none in rehab and stratera 60mg qd and 40mg escitalopram and geodon 60mg po qd.  February 6 showed that the pancreas was unremarkable, the liver was homogeneous/even in echotexture with no discrete lesions. Liver vessels were patent. Common bile duct was normal at 2 mm.  Right kidney was 10.8 cm with no hydronephrosis. Spleen was normal at 7.9 cm with splenic vessels patent.  Pt says she was drinking more in 2020 and not until this past year. 6m of this,  Smoking 1/2 ppd and need to work on this.  She says mother is doing study with [facility]. Doing a clinical study for her lungs and for chronic bronchitis.  She says she had labs done last week. Rheum saw.  2-7-2023 and wbc 8.6 hg 15 and plat 387 and neutrophils 4.9 and lymph 2.7 and gly 89 and bun 11 and cr 0.85 and na 140 and k 4.2 and cl 99 and co2 24 and alb 4.7 and tb 0.9 and alk 161 and ast 25 and alt 29.  Alcohol could not sense with the vyvance.  She was changed from adderall to vyvanse and maybe for 3m. She is on ursodiol 300mg po tid.  9/14/22 labs were sent to us. White blood cell count 10.8, hemoglobin 13.2, MCV 95, platelets 337, lymphocytes 2.3, neutrophils 7.3 which is slightly elevated please make sure your primary care doctor is aware. Glucose 98, creatinine 0.65, sodium 142, potassium 4.1, bilirubin 0.5, alkaline phosphatase 146, AST 17, ALT 35. The alkaline phosphatase and the AST are trending in the right direction. Slight increase in the alt, previousl it was 31. We will continue to monitor this. Also of note the protein was slightly low at 5.9 , please share with your primary care.  Aug 5: glu 93 and bun 6 and cr 0.92 and na 141 and k 4.3 and cl 102 and co2 23. Wbc 10.4 and hg 14.6 and hct 44.3 and plat 404.  Mcv 95. Neutrophils 7.3 little up and prior normal 6.8. Lymphs 2.1. Alb 4.4 and tb 0.8 and db 0.22 and alk 150 ast 27 and alt 23.  Prior June 27: alk 183 and ast 18 and alt 31. So labs trending right way with some variation in ast and alt but still in normal range for ast and alt. No clear acute issues.  We will contact [facility] for the u.s done feb 3.  Aug 5  ultrasound shows liver normal in contour and echogenicity.   No suspicious liver lesions. Common bile duct was normal at 2.4 mm and gallbladder absent. Right kidney 9.6 cm with no hydronephrosis. Spleen normal at 8.6 cm. Imaged pancreas portions unremarkable with tail partially not well seen due to gas. Liver vessels patent. Overall good to see that there was no focal lesions and no suspicion for any fibrotic morphology.  She started to drink again and she is having like tequila and has 1 ounce and doing 6 ounces on a rare day and 3 ounces a day. Started 1 month ago.  Pt says labs in Jan 2022 were up and she was ordered a medicine that had diclofenac and Geodon in Jan. She is stil on the geodon at 40mg.  June 27 labs show glucose normal at 86 BUN of 10 creatinine 0.93 down from 0.94.  Sodium 141 potassium 3.9 chloride 103 CO2 24. White blood count still up at 11 but down from 13.5 with.  Hemoglobin 13.2 which is stable.  Platelet count normal at 363 from 411.  MCV normal at 92.  Neutrophils normal at 6.8 and lymphocytes 3.0. Albumin 4.1 bilirubin 1.1 direct bilirubin 0.3.  Alk phos down to 183 from 213.  AST down to 18 and ALT down to 31 from previous AST 17 and ALT 35.  Ideal ALT is less than 25.   June 8 labs showed glucose elevated at 123 and previously was normal but has occasionally been elevated as back in May 3 it was 108.  Please share with primary provider.  Creatinine down to 0.94 from 1.15 so doing better there.  Sodium 138 potassium 4.2 chloride 103 CO2 of 22. White blood  cell count up at 13.5 from 10.3.  Were you ill recently?  Hemoglobin 13.2 platelet count down to normal at 411 from 452.  Neutrophils up at 10.8 and again wonder if he had a recent infection or vaccine?  Lymphocytes 2.1 normal. Albumin 4.5 bilirubin 0.6 which is stable direct bilirubin 0.2.  Alkaline phosphatase slightly lowered to 13 from 220 AST low at 17 from 21 previously 38.  ALT still slightly up at 35 but previously 35 and back on May 3 have been 65. Overall liver labs are dropping but slowing down on that. Any new meds? Maybe role of recent illness?  May 19 labs show glucose down to 97 from 108 before.  BUN of 10.  Creatinine still remains slightly up at 1.15 from 1.13.  He to stay hydrated with the increasing temperatures. Sodium 135 potassium 4.7 chloride 96 CO2 of 21. White blood cell count 10.3 hemoglobin 14.1 platelet count slightly up at 452 and was previously 430.  MCV 92.  Neutrophils 6.4 lymphocytes 2.7 normal. Albumin 4.6 bilirubin 0.6 which is down from 1.0 and prior 1.4.  Direct bilirubin 0.21.  Alk phos remains at 220 was previously 220 and prior 180.  AST down to 21 and previously 38.  ALT down to 35 from previously 65.  May 3 labs show glucose slightly up at 108 and may be not fasting that day?  BUN of 12 creatinine elevated at 1.13 previously 1.06.  Sodium 139 potassium 4.5 chloride 101 CO2 21.  The chloride and CO2 are back to normal which they were off last time. White blood cell count down to 10 hemoglobin 14.8 platelet count 430 MCV 90 neutrophils normal at 6.5 lymphocytes normal now at 2.5. Albumin 4.7 also down from 5.0.  Bilirubin normal now at 1.0 alkaline phosphatase still elevated though at 220 previously was 180.  AST was 38 and ALT 65.  Previously AST 27 and ALT 44. Called pt and no new meds. Did drop citalopram to 10mg po qd and she is now off. Soumya 300mg po tid for 2 weeks or so. Asked if gaining weight and she is not. Smoking a lot of cigarettes and I have seen that before but not common to see. She will work on this and see how she does. She will try to work on that. Redo the labs in 2 weeks.  She stopped her lisinopril and changed to amlodipine and did not start as bp is low. 95/71.  January 19, 2022 ultrasound sent in. Liver appeared to be increased abd coarsened in regards to echogenicity.  No definite liver lesions.  No dilated ducts. Normal hepatopetal flow seen in the main portal vein.  Imaged hepatic veins also were patent. Common bile duct normal at 5 mm. Gallbladder absent. Right kidney 10.6 cm with no hydronephrosis. Left kidney 10.5 cm with no hydronephrosis.  Spleen normal at 8.7 cm. They suspected that the liver could have moderate diffuse fatty infiltration.  I would state that this was also at the time that you are having a medication effect so it could be that threw that reading off.     April 4, 2022 labs show glucose 93 which is normal BUN of 9 creatinine slightly up at 1.05 and was previously 1.01. May want to look at your hydrational state. Sodium 139 potassium 4.7 chloride 99 CO2 of 22. Albumin 4.8 bilirubin 1.2 which is normal and direct bilirubin 0.28 suggesting that you have Gilbert's syndrome or that indirect hyperbilirubinemia that is benign and present about 10% of people. Alkaline phosphatase down to 187 from 202 and down previously from 230.  AST of 24 down from 36 and prior 49. ALT 33 down from 41 and ALT 76.  She had a prior bump in lfts from working on epoxys.  Making cups.  She also asked re asa and crestor and  she has not started the aspirin yet and she was to start to crestor and she will start post the next labs.  March 21 labs show glucose still slightly up at 104 previously 106.  BUN of 7 and creatinine down to 1.01 from 1.19 so that was better.  Sodium 136 potassium 4.2 chloride 101 CO2 slightly low at 18. Total bilirubin normal at 1.1 previously 1.2 direct bilirubin 0.29 and I would suggest that you have Gilbert's.  Gilbert's as you recall is that indirect hyperbilirubinemia that is very common and present about 10% of people. The alkaline phosphatase is down to 202 from 230 and the AST is down to 36 from 49 and ALT 41 from 76.  It would appear that you change something? White blood cell count 9.7 hemoglobin 14.1 platelet count 444 down from 499. Important to note the white cell count also came down from 13.4 to 9.7.  Neutrophils 6.8 and lymphocytes 2.1 both normal. Called pt re the labs. She says did not change meds. 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   March 9 labs show white blood cell count 13.4 up from 11.7 and prior 10.7.  Hemoglobin 14.6.  Platelet count up a little at 499 from 445.  It has been variable before though.  MCV normal at 90.  Neutrophils up at 9.5 and lymphocytes 2.5 down from last time. Glucose 106 slightly up and BUN of 9 creatinine slightly higher at 1.9 and previously 1.15.   Sodium 137 potassium 4.1 chloride 100 CO2 slightly low at 19.  Albumin 4.0.  Total bilirubin 1.2 normal.  Direct bilirubin 0.35.  Alkaline phosphatase up to 233.  AST 49 and ALT 76 and previously AST 29 ALT 40. Called pt as labs trending up. Meds: propanol, furosemide, spirinolactone, basagalar, Spiriva and Symbicort, Geodon and lamotrigine. Ursodiol. Lamotrigine 1% risk for incl fts and on for a year. Geodon: category c for liver injury and occurs within 1-4 weeks and she has been on it for 6-8 weeks. She will discuss with psych re this.   March 3 labs show liver alkaline phosphatase fraction is 76% bone fraction is 24% and intestinal 0%  so clearly the alkaline phosphatase is coming mainly from liver.  This is the normal pattern that one would expect. March 3 labs also show albumin slightly up at 4.9 from 4.7.  Could reflect hydrational status. Total bilirubin normal at 1.2 and direct bilirubin 0.32 suggesting a Gilbert's syndrome which is a benign finding. Alkaline phosphatase remains elevated at 203 and previously was 175.  AST 29 and ALT 40 and previously AST 20 and ALT 34. Unfortunately these labs arrived very late so we need to go over with you what medicines you are on and since when.  They are not dramatically worse but they are definitely drifting up.  February 24 labs show white blood cell count elevated at 11.7 previously was 10.7.  Hemoglobin 14.5 normal platelet count 445 down from 468.  MCV 92.  Neutrophils elevated at 7.1 and lymphocytes 3.4.  Have you been ill?  Glucose 107 BUN of 12 creatinine 1.15 down from 1.26.  Normal is from 0.57 up to 1.0 so still elevated.  Sodium 137 k 3.8 chloride 98 CO2 of 22.  Albumin 4.7 normal now.  Limit 0.8 direct bilirubin 0.18 and alkaline phosphatase elevated at 175.  Previously 107.  AST 20 and ALT 34.  Previously AST 26 and ALT 30.  Need to watch this evolving alkaline phosphatase trend. Called pt: she had surgery on the right elbow and so this could be from bone and had moved ulnar nerve. She also is on some prednisone and she is on prednisone 2.5mg po qd. We need to fractionate the alkaline phosphatase. We can redo the labs next week to check on that.  February 2 labs show white blood cell count 10.7 hemoglobin 14.4 platelet count up at 468.  MCV was 90 which is normal.  Neutrophils 6.3 and lymphocytes elevated at 3.3. Glucose slightly up at 102 BUN of 13 creatinine elevated at 1.26 sodium 139 potassium 3.9 chloride 99 CO2 of 22 albumin elevated at 4.9 bilirubin 0.8 direct bilirubin 0.21 alkaline phosphatase 107 AST 26 and ALT 30 so clearly that must have been that herbal smooth product that you were on and the other herbals and we can reassess from there. Called pt: she had ketorolac also. Start the ursodiol 300mg po bid with food. She will do that and follow labs.  Started atorvastatin since dec 2020 and she is still on that for now and then to do the crestor,  January 4 local labs sent in. Glucose 159 elevated please share with primary provider.  He had a 16 creatinine 0.96 sodium 138 potassium 4.2 chloride 99 calcium 9.9 total bili 0.4 alkaline phosphatase 202 and . Prior December 29 labs showed alk phos 114 AST 28 and ALT 45 so clearly those numbers bumped. White blood cell count 8.4 hemoglobin 13.2 platelet count 350. Cholesterol 226 elevated.  Triglyceride 284 elevated.   elevated.  Hemoglobin A1c slightly better at 7.9 from 8.1%.  TSH 1.78.  Magnesium 2.2. Called pt: need new labs to see what labs doing.  Dec 29 labs: ast 48 and alt 31 and alk 111 and tb 0.4.  December 13 2021  labs show white blood  cell count elevated 11.9 but down from 12.6.  Hemoglobin 15 and platelets up at 503 and previously 394.  That can sometimes go up with inflammation or medicines.  Please be sure to share this info with primary providers. MCV normal at 90.  Neutrophils elevated 7.4 but down from 10.1 back on December 2.  Lymphocytes normal at 3.1 and monocytes slightly up at 1.0. Glucose elevated at 149 and was previously 114.  Hemoglobin A1c up to 8.1 and was previously 6.3.  You should look at this sugar issue again with your primary provider as that is unusual for you. BUN of 12 creatinine 1.19 which is up from 1.014 sodium 136 potassium 4.2 chloride 98 CO2 20. Calcium at 10.4 and was previously normal at 9.6 and I wonder if it is medicine related? Albumin 4.6 bilirubin 0.8 alkaline phosphatase slightly higher at 130 previously 126. AST down to 32 and ALT down to 49 previously AST 65 and ALT 66. C-reactive protein noted to be normal at 2.  Dec 1 labs much better: Tp 6.9 and alb 4.2 and tb 0.2 and db 0.10 and alk 111 and ast 19 and alt 43 and down from prior alk 301 and ast 77 and alt 171. So doing much better. Keep track of any new meds added or removed.  November 98 labs show 123 which is elevated BUN of 18 creatinine 0.99 sodium 137 potassium 5.1 chloride 99 CO2 of 24 albumin 4.7 bilirubin 0.5 alkaline phosphatase down to 118 from 194.  AST 29 and previously 21 ALT down to 46 from 53.  So clearly centimeters drop. She says she had increased the Seroquel and then being weaned on it now. She is now on 50 mg of Seroquel and she is to stop it tonight and she will redo labs in 1-2 weeks to be sure.  Ca 10.3 little up and she is not on supplements for this and follow course. She will let primary md know.  Oct 12: alb 4.5 and tb 0.6 and db 0.19 and alk 301 and ast 77 and alt 171 and prior ast 33 and alt 52. went to 25 and 50 and now 100mg.  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 Says taking dilaudid and ibuprofen for that. She says taking a dilaudid occ and one ibuprofen. 10-1 labs also off. Still on steroids 10mg po qd for the rheum arthritis so enbrel stopped working and went to this. She did myasthenia screen and neg and started on this and trying to do humira sunday.   10-1 alk 199 and ast 56 and alt 149.   September 21 ultrasound showed the liver to be 15.6 cm with mild hepatic steatosis but no focal mass. Liver vessels are noted to be patent. Prior cholecystectomy changes seen. No bile duct dilation seen with common bile duct 5 mm. Visualized pancreas portions were unremarkable proportions of the head and tail the pancreas were not seen due to gas. Right kidney 10.6 cm and left kidney 10.8 cm with no hydronephrosis. Spleen normal at 8.5 cm. Mild liver fat overall was seen patent vessels were seen. No ascites was seen.  September 21 labs show albumin 4.9 which remains slightly up bilirubin 0.4 which is down from 0.8 direct bilirubin 0.13 which is down from 0.23. Alkaline phosphatase down to 198 from 207 AST slightly up at 33 from 28 ALT 52 down from 66.  September 9 redo labs show albumin stable 4.9 bilirubin 0.8 direct 0.23 alkaline phosphatase slightly lower at 207 from 215. AST down to 28 from 61. ALT down to 66 from 86.  So it does appear the labs are dropping with lamictal drop 150 to 100mg and then went up on seroquel. Sept 7 labs: alb 4.8 and tb 0.7 and db 0.2 and alk 215 and Ast 61 and alt 86.  Prior aug 27 albs ast 18 and alt 28 and alk 135. that was pre lamictal changed.  She had prior drinking green tea for a few weeks. She has an immune issue and so the labs spiked up. then stopped and labs settled last year off that.  Pt was to do a bx and trouble breathing and Dr Self told to do barium swallow and manometry and did the barium and to do the manometry and says told was ok.  Ent saw some plaques and she had bx done.  Pelvic area pain noted and se started with pain and went to [place] [facility] [facility] and she said cyst in fallopian tube and ruptured and did a full hysterectomy and that was July 28 2021.  Ex  burned her citizenship certificate. She said she applied to get it. 555.00 for that.  Enbrel started earlier for seronegative RA.  She says needed onc re wbc up and asked him to do the lfts and she says part of labs back and she has from aug 8 2021. Ast and alt done and alk 209 and prior 138 and ast 149 and alt prior 34 and alt 231 and 76 july 19. b12 1804 and IGA 68 little low.  May 3:wbc 9.6 hg 13.7 and plat 355 and mcv 93. Neutrophils 6.5. glu 92 and cr 0.95 and na 137 and k 4.3 and ca 9.8 and alb 4.5 and tb 0.3 and alk 177 elevated and ast 30 and alt 70 and tesosterone level 6 normal per chart for your age. Prior alk 277 and ast 126 and alt 315. So came down post that doxycycline exposure.  April 6, 2021 [facility]s at [facility]: pancreas appears normal where seen and liver demonstrates normal size and contour and echogenicity. Liver vessels patent. Gallbladder absent. Common bile duct 4mm. No hydronephrosis seen to kidneys. Spleen 8.4cm. No liver mass seen.  Jan 11: glu 171 elevated and show local md. cr 1.06 and na 137 and k 4.0 and cl 98 and co2 21 and ca 9.7 normal and alb 4.6 and tb 0.7 and alk 88 and ast 13 and alt 16. So liver labs doing very well. A1c 5.9% noted.  Jan 20 2021: ct no cardiomegaly. Clear lung bases. Unremarkable liver. Prior gb surgery changes. Unremarkable spleen. Pancreas and and adrenal glands normal. cyst right kidney. left renal cyst. Mild rectosigmoid wall thickening. Liver vessels patent. No bulk adenopath.  Nov 9 2020 labs: wbc 13. 4hg 14 and plat 453 elevated slightly (150- 450 normal). na 135 k 4.6 and cl 97 and co2 26 and calcium elevated 11.0.  Show to local md: why is calcium up?alb 4.5 and tb 0.6 and alk 165 elevated and ast 22 and alt 36 so back down. chol 336 elevated and trg 307 and ldl 219. Lipids off and not sure if due to recent liver flare or other and may want to redo that as known if liver flared that this can be off. Vit d low 28.8. a1c 5.9%.  She says has had low vit d and she was to start this and she was started on 5000 a day for 4 m and she had been taking some vit d. She says she had stopped it for surgery July and restarted it again.  She says was on doxycycline and said had a prior hx of tcn reaction and filled and she said she may have been more ill from it. No reexposure.  Nov 3 2020 : ast 50 and alt 121 (both down then from 121 and 222). main she is off green tea and the lamictal also off prior. Suspect green tea was immune stimulant reving up your immune system. She has a very active immune system.  Rheum says she has fibromyalgia and doing water therapy.  11-2-20: ast down to 50 and alt 118 and prior 121 and alt 222 . alk 213 still up from 210 and tb down to 0.4 from 0.5.  Oct 26 labs tb 0.5 and alk 210 and ast 121 and alt 222. alk 248 and so now 210 so that is better, and ast 128 and that is now 121 and alt was 248 and now 222 so better and we need to follow.  Oct 19 labs spiked again: alk 198 and ast 128 and alt 248. Tb 0.5. na 136 and k k 4.5. bun 15 and cr 0.94 and glu 112.   Oct 6 ast 16 and alt 25 and alk 111. Prior she was also on abx for 6 weeks and so finished oct 8 or so and so that not the cause.  Oct 6: wbc 11.2 little up, hg 14.4 and hct 42.2, plat 141. Mcv 90, glu 103 little up and maybe not fasting. Bun 20 and cr 0.98 and na 135 and k 4.5, cl 99 and co2 22. Alb 4.2 and tb 0.5 and alk 111 and ast 16 and alt 25.  Prior visit lamictal came down from 75/50 to 50/25 as of oct 9 and added gabapentin 400mg and seroquel 25mg po qhs an atorvastatin 40mg po qhs.  Off the prior nasal exposures also.  Aunt Ewelina passed in oct 2020 had oltx and she had failed from 2nd tx. Not from covid 19.  U.s aug 2020 [facility]: they saw mild fatty infiltration of the liver. Prior gb surgery changes seen. Visualized pancreas portions normal. No liver masses. Common duct 4mm. Portal vein patent. Kidneys unremarkable. Spleen 7.8cm unremarkable. Should get better as time passes from the issues we discussed.  Aug 4 2020 labs: glu 61 and little low bun 17 and cr 0.91 and na 135 and k 5.2 and cl 96 and co2 20 and ca 10.9 elevated and show local md. alb 4.7 and tb 0.6 and alk 104 and ast 24 and alt 28.  She was on ursodiol for has been on it for her liver started july 29 and she did stop it . She is staying off this as last time ok and may need it.  Nov 25 2020: wbc 11.3 and hg 14.1 plat 484 elevated and neutrophils 7.7 and glu 144 elevated and cr 1.08 little up.na 139 and k 4.3 and cl 99 and co2 24 and ca 10 and alb 4,7 and tb 0.3 and alk 130 and ast 15 and alt 18. Addendum: 1. Smoothie immune booster did. 2. Michelle took that. 3. She will confirm medicine.  Last time had been down 120 and now 110 in aug.   Plan: 1.  She is now sobriety at almost 5 months. 2. SHe did labs and sending in. 3, She needs u,s in Feb. 4, Pending the labs do in Jan,  Addendum: Oct 20 2023 glu 129 and bun 9 and cr 0.9 and na 140 and k 5.0 and cl 101 and co2 23 and ca 10.3 and alb 4.6 and tb 0.9 and alk 149 and ast 23 and alt 39.   She says some meds change and will do labs next week and in Jan when does the u.s.  Duration of visit was 30 min with 10 min of prep and loading to ecw and then 20 min by healow video with time spent reviewing chart and events with the patient and in discussing plans.

## 2023-12-11 ENCOUNTER — LAB OUTSIDE AN ENCOUNTER (OUTPATIENT)
Dept: URBAN - METROPOLITAN AREA TELEHEALTH 2 | Facility: TELEHEALTH | Age: 48
End: 2023-12-11

## 2024-01-01 ENCOUNTER — LAB OUTSIDE AN ENCOUNTER (OUTPATIENT)
Dept: URBAN - METROPOLITAN AREA TELEHEALTH 2 | Facility: TELEHEALTH | Age: 49
End: 2024-01-01

## 2024-01-03 ENCOUNTER — TELEPHONE ENCOUNTER (OUTPATIENT)
Dept: URBAN - METROPOLITAN AREA CLINIC 86 | Facility: CLINIC | Age: 49
End: 2024-01-03

## 2024-01-03 LAB
A/G RATIO: 1.7
ALBUMIN: 4.6
ALKALINE PHOSPHATASE: 181
ALT (SGPT): 28
AST (SGOT): 18
BASO (ABSOLUTE): 0.1
BASOS: 1
BILIRUBIN, TOTAL: 0.5
BUN/CREATININE RATIO: 13
BUN: 10
CALCIUM: 10.2
CARBON DIOXIDE, TOTAL: 25
CHLORIDE: 103
CREATININE: 0.78
EGFR: 93
EOS (ABSOLUTE): 0.2
EOS: 2
GLOBULIN, TOTAL: 2.7
GLUCOSE: 66
HEMATOCRIT: 45.2
HEMATOLOGY COMMENTS:: (no result)
HEMOGLOBIN: 15.2
IMMATURE CELLS: (no result)
IMMATURE GRANS (ABS): 0
IMMATURE GRANULOCYTES: 0
LYMPHS (ABSOLUTE): 2.3
LYMPHS: 26
MCH: 31.5
MCHC: 33.6
MCV: 94
MONOCYTES(ABSOLUTE): 0.7
MONOCYTES: 8
NEUTROPHILS (ABSOLUTE): 5.5
NEUTROPHILS: 63
NRBC: (no result)
PLATELETS: 376
POTASSIUM: 4.3
PROTEIN, TOTAL: 7.3
RBC: 4.83
RDW: 11.8
SODIUM: 143
WBC: 8.8

## 2024-01-03 NOTE — HPI-TODAY'S VISIT:
Dear Chanda Sarmiento,  January 2 labs show WBC normal at 8.8 hemoglobin 15.2 platelet count 376 MCV 94.  Neutrophils and lymphocytes both normal. Glucose slightly low at 66 with normal for that lab being 70-99.  BUN of 10 creatinine 0.78 sodium 143 potassium 4.3 calcium 10.2 albumin 4.6 bilirubin 0.5 and only your alkaline phosphatase was elevated at 181.  AST was normal at 18 and ALT was normal at 28. Prior labs that you did in September 18 had shown an alk phos of 5172 and your AST 23 and ALT 35 so these appear to be lower now than then.  Unclear why you are alk phos is isolated up and we may want to talk about fractionating your alkaline phosphatase.  It is still possible that a medication could be causing that to be off as well so lets go over your medicine list at the upcoming visit and review these options with you.  Please share with local providers. Dr. Issa

## 2024-01-05 ENCOUNTER — OFFICE VISIT (OUTPATIENT)
Dept: URBAN - METROPOLITAN AREA CLINIC 22 | Facility: CLINIC | Age: 49
End: 2024-01-05
Payer: COMMERCIAL

## 2024-01-05 DIAGNOSIS — K75.4 AUTOIMMUNE HEPATITIS: ICD-10-CM

## 2024-01-05 DIAGNOSIS — K71.9 DRUG-INDUCED LIVER DISEASE: ICD-10-CM

## 2024-01-05 PROCEDURE — 76705 ECHO EXAM OF ABDOMEN: CPT

## 2024-01-05 PROCEDURE — 93975 VASCULAR STUDY: CPT

## 2024-01-08 ENCOUNTER — LAB OUTSIDE AN ENCOUNTER (OUTPATIENT)
Dept: URBAN - METROPOLITAN AREA TELEHEALTH 2 | Facility: TELEHEALTH | Age: 49
End: 2024-01-08

## 2024-01-16 ENCOUNTER — WEB ENCOUNTER (OUTPATIENT)
Dept: URBAN - METROPOLITAN AREA CLINIC 86 | Facility: CLINIC | Age: 49
End: 2024-01-16

## 2024-01-24 ENCOUNTER — OFFICE VISIT (OUTPATIENT)
Dept: URBAN - METROPOLITAN AREA TELEHEALTH 2 | Facility: TELEHEALTH | Age: 49
End: 2024-01-24

## 2024-01-24 ENCOUNTER — WEB ENCOUNTER (OUTPATIENT)
Dept: URBAN - METROPOLITAN AREA CLINIC 86 | Facility: CLINIC | Age: 49
End: 2024-01-24

## 2024-01-24 RX ORDER — URSODIOL 300 MG/1
1 CAPSULE CAPSULE ORAL
Qty: 60 | Refills: 1 | Status: ACTIVE | COMMUNITY

## 2024-01-24 RX ORDER — LISDEXAMFETAMINE DIMESYLATE 60 MG/1
1 TABLET IN THE MORNING TABLET, CHEWABLE ORAL ONCE A DAY
Status: ACTIVE | COMMUNITY

## 2024-01-24 RX ORDER — VARENICLINE 0.03 MG/.05ML
1 SPRAY IN EACH NOSTRIL SPRAY NASAL TWICE A DAY
Status: ACTIVE | COMMUNITY

## 2024-01-24 RX ORDER — ATORVASTATIN CALCIUM 20 MG/1
1 TABLET TABLET, FILM COATED ORAL ONCE A DAY
Status: ACTIVE | COMMUNITY

## 2024-01-24 RX ORDER — VORTIOXETINE 20 MG/1
1 TABLET TABLET, FILM COATED ORAL ONCE A DAY
Status: ACTIVE | COMMUNITY

## 2024-01-24 RX ORDER — ZIPRASIDONE HYDROCHLORIDE 60 MG/1
1 CAPSULE WITH FOOD CAPSULE ORAL ONCE A DAY
Status: ACTIVE | COMMUNITY

## 2024-01-24 RX ORDER — BUDESONIDE, GLYCOPYRROLATE, AND FORMOTEROL FUMARATE 160; 9; 4.8 UG/1; UG/1; UG/1
2 PUFFS AEROSOL, METERED RESPIRATORY (INHALATION) TWICE A DAY
Status: ACTIVE | COMMUNITY

## 2024-01-24 NOTE — HPI-TODAY'S VISIT:
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  She says she did labs 1m ago [facility].  Dear Chanda Sarmiento,  January 2 labs show WBC normal at 8.8 hemoglobin 15.2 platelet count 376 MCV 94. Neutrophils and lymphocytes both normal. Glucose slightly low at 66 with normal for that lab being 70-99. BUN of 10 creatinine 0.78 sodium 143 potassium 4.3 calcium 10.2 albumin 4.6 bilirubin 0.5 and only your alkaline phosphatase was elevated at 181. AST was normal at 18 and ALT was normal at 28. Prior labs that you did in September 18 had shown an alk phos of 5172 and your AST 23 and ALT 35 so these appear to be lower now than then. Unclear why you are alk phos is isolated up and we may want to talk about fractionating your alkaline phosphatase. It is still possible that a medication could be causing that to be off as well so lets go over your medicine list at the upcoming visit and review these options with you.  Please share with local providers. Dr. Issa  September 18 sent in. Glucose was slightly up at 102 BUN of 14 creatinine 0.86 sodium 140 potassium 4.7 calcium 10.2 albumin 4.4 bilirubin 0.4 alk phos elevated at 172 and back in September 11 of been 167. Your AST was higher at 23 and ALT higher at 35 and previous in September 11 your AST was 13 and ALT of 17. Sed rate was noted to be 2 and C-reactive protein was 1 and PTH was 33. September 11 labs show WBC 7.7 hemoglobin elevated 16.7 plate count 423 MCV 93 with a BUN of 9 creatinine 0.96 sodium 142 potassium 5.2 elevated calcium of 10.6. AST then again 13 and ALT of 17 alk phos 167. Cholesterol was up at 270 and triglycerides were up at 378 and HDL 48 and  so those were definitely off. INR normal at 1.0. B12 normal at 637 and folate normal at 9.9. Hemoglobin A1c slightly up at 5.8 so you need to watch that as you may be moving into prediabetes. TSH 1.67. Uric acid was 3.2. For comparison back in February your AST was 25 and ALT 29 alk phos 161. Very important to see what are you doing differently between September 11 and the September 18 labs.  Full labs from July 22 sent in. You read me the essential ones but the full labs showed glucose 72 BUN of 8 creatinine 0.64 sodium 137 potassium 4.2 chloride 106 CO2 of 22 BUN 4.4 bilirubin 0.7 alk phos elevated at 172 AST 24 and ALT 20. Back in May your alk phos of been 157 so that appears to be a little higher. TSH 0.76. WBC 7.1, hemoglobin up at 16.6 hematocrit up at 50.8 plate count 436 elevated MCV 99. Neutrophils 3479 lymphocytes 2918. Hep B core total negative. RPR negative. HIV test negative. Hep B surface antigen negative. Hep C antibody negative. July 22 drug screen had been positive for alcohol metabolites, amphetamine, and marijuana.  She says she did well on therapy and she has done program and staying off the alcohol.  August 30 ultrasound shows liver echogenicity to be normal with no focal lesions. Common bile duct was normal at 3 mm. Prior cholecystectomy changes were seen. No ascites was seen. Visualized pancreas portions appeared unremarkable. Liver vessels were patent. Spleen was normal at 7.4 cm. Right kidney 10.2 cm with no hydronephrosis. In summary you have a normal-appearing liver and patent vessels.  Prior cholecystectomy changes were seen.  You mentioned she justy left rehab x 28 days and she says sent to [facility].  July 22: last ones: ast 24 and alt 20 and alk 172 up and hg 16.6 cr 0.64. She [facility] to [facility].  She has been off the alcohol now 41 day.  Labs may be better now as further than the last labs.  Pt last visit had beenm having some alcohol but she has cut back some and she is following with therapy and having 8 ounces and day and if have more than 2-3 drinks a day at risk for cirrhosis,  May 11 2023 glu 70 and and bun 11 and cr 0.68 and na 136 and k 4 and cl 99 and co2 26 and ca 10.3 and alb 4.6 and tb 1.1 and alk 157 and ast 34 and alt 60 and mg 2.1 and tsh 1.10 and wbc 9.3 and hg 15.9 and hct 45.5 and platelet 362, mcv 93.4  neutrophils 6371 and lymphs 2018.b432 and folate 14 vit d 78 and ethyl alcohol and over 31270.  Re meds: none in rehab and stratera 60mg qd and 40mg escitalopram and geodon 60mg po qd.  February 6 showed that the pancreas was unremarkable, the liver was homogeneous/even in echotexture with no discrete lesions. Liver vessels were patent. Common bile duct was normal at 2 mm.  Right kidney was 10.8 cm with no hydronephrosis. Spleen was normal at 7.9 cm with splenic vessels patent.  Pt says she was drinking more in 2020 and not until this past year. 6m of this,  Smoking 1/2 ppd and need to work on this.  She says mother is doing study with [facility]. Doing a clinical study for her lungs and for chronic bronchitis.  She says she had labs done last week. Rheum saw.  2-7-2023 and wbc 8.6 hg 15 and plat 387 and neutrophils 4.9 and lymph 2.7 and gly 89 and bun 11 and cr 0.85 and na 140 and k 4.2 and cl 99 and co2 24 and alb 4.7 and tb 0.9 and alk 161 and ast 25 and alt 29.  Alcohol could not sense with the vyvance.  She was changed from adderall to vyvanse and maybe for 3m. She is on ursodiol 300mg po tid.  9/14/22 labs were sent to us. White blood cell count 10.8, hemoglobin 13.2, MCV 95, platelets 337, lymphocytes 2.3, neutrophils 7.3 which is slightly elevated please make sure your primary care doctor is aware. Glucose 98, creatinine 0.65, sodium 142, potassium 4.1, bilirubin 0.5, alkaline phosphatase 146, AST 17, ALT 35. The alkaline phosphatase and the AST are trending in the right direction. Slight increase in the alt, previousl it was 31. We will continue to monitor this. Also of note the protein was slightly low at 5.9 , please share with your primary care.  Aug 5: glu 93 and bun 6 and cr 0.92 and na 141 and k 4.3 and cl 102 and co2 23. Wbc 10.4 and hg 14.6 and hct 44.3 and plat 404.  Mcv 95. Neutrophils 7.3 little up and prior normal 6.8. Lymphs 2.1. Alb 4.4 and tb 0.8 and db 0.22 and alk 150 ast 27 and alt 23.  Prior June 27: alk 183 and ast 18 and alt 31. So labs trending right way with some variation in ast and alt but still in normal range for ast and alt. No clear acute issues.  We will contact [facility] for the u.s done feb 3.  Aug 5  ultrasound shows liver normal in contour and echogenicity.   No suspicious liver lesions. Common bile duct was normal at 2.4 mm and gallbladder absent. Right kidney 9.6 cm with no hydronephrosis. Spleen normal at 8.6 cm. Imaged pancreas portions unremarkable with tail partially not well seen due to gas. Liver vessels patent. Overall good to see that there was no focal lesions and no suspicion for any fibrotic morphology.  She started to drink again and she is having like tequila and has 1 ounce and doing 6 ounces on a rare day and 3 ounces a day. Started 1 month ago.  Pt says labs in Jan 2022 were up and she was ordered a medicine that had diclofenac and Geodon in Jan. She is stil on the geodon at 40mg.  June 27 labs show glucose normal at 86 BUN of 10 creatinine 0.93 down from 0.94.  Sodium 141 potassium 3.9 chloride 103 CO2 24. White blood count still up at 11 but down from 13.5 with.  Hemoglobin 13.2 which is stable.  Platelet count normal at 363 from 411.  MCV normal at 92.  Neutrophils normal at 6.8 and lymphocytes 3.0. Albumin 4.1 bilirubin 1.1 direct bilirubin 0.3.  Alk phos down to 183 from 213.  AST down to 18 and ALT down to 31 from previous AST 17 and ALT 35.  Ideal ALT is less than 25.   June 8 labs showed glucose elevated at 123 and previously was normal but has occasionally been elevated as back in May 3 it was 108.  Please share with primary provider.  Creatinine down to 0.94 from 1.15 so doing better there.  Sodium 138 potassium 4.2 chloride 103 CO2 of 22. White blood  cell count up at 13.5 from 10.3.  Were you ill recently?  Hemoglobin 13.2 platelet count down to normal at 411 from 452.  Neutrophils up at 10.8 and again wonder if he had a recent infection or vaccine?  Lymphocytes 2.1 normal. Albumin 4.5 bilirubin 0.6 which is stable direct bilirubin 0.2.  Alkaline phosphatase slightly lowered to 13 from 220 AST low at 17 from 21 previously 38.  ALT still slightly up at 35 but previously 35 and back on May 3 have been 65. Overall liver labs are dropping but slowing down on that. Any new meds? Maybe role of recent illness?  May 19 labs show glucose down to 97 from 108 before.  BUN of 10.  Creatinine still remains slightly up at 1.15 from 1.13.  He to stay hydrated with the increasing temperatures. Sodium 135 potassium 4.7 chloride 96 CO2 of 21. White blood cell count 10.3 hemoglobin 14.1 platelet count slightly up at 452 and was previously 430.  MCV 92.  Neutrophils 6.4 lymphocytes 2.7 normal. Albumin 4.6 bilirubin 0.6 which is down from 1.0 and prior 1.4.  Direct bilirubin 0.21.  Alk phos remains at 220 was previously 220 and prior 180.  AST down to 21 and previously 38.  ALT down to 35 from previously 65.  May 3 labs show glucose slightly up at 108 and may be not fasting that day?  BUN of 12 creatinine elevated at 1.13 previously 1.06.  Sodium 139 potassium 4.5 chloride 101 CO2 21.  The chloride and CO2 are back to normal which they were off last time. White blood cell count down to 10 hemoglobin 14.8 platelet count 430 MCV 90 neutrophils normal at 6.5 lymphocytes normal now at 2.5. Albumin 4.7 also down from 5.0.  Bilirubin normal now at 1.0 alkaline phosphatase still elevated though at 220 previously was 180.  AST was 38 and ALT 65.  Previously AST 27 and ALT 44. Called pt and no new meds. Did drop citalopram to 10mg po qd and she is now off. Soumya 300mg po tid for 2 weeks or so. Asked if gaining weight and she is not. Smoking a lot of cigarettes and I have seen that before but not common to see. She will work on this and see how she does. She will try to work on that. Redo the labs in 2 weeks.  She stopped her lisinopril and changed to amlodipine and did not start as bp is low. 95/71.  January 19, 2022 ultrasound sent in. Liver appeared to be increased abd coarsened in regards to echogenicity.  No definite liver lesions.  No dilated ducts. Normal hepatopetal flow seen in the main portal vein.  Imaged hepatic veins also were patent. Common bile duct normal at 5 mm. Gallbladder absent. Right kidney 10.6 cm with no hydronephrosis. Left kidney 10.5 cm with no hydronephrosis.  Spleen normal at 8.7 cm. They suspected that the liver could have moderate diffuse fatty infiltration.  I would state that this was also at the time that you are having a medication effect so it could be that threw that reading off.     April 4, 2022 labs show glucose 93 which is normal BUN of 9 creatinine slightly up at 1.05 and was previously 1.01. May want to look at your hydrational state. Sodium 139 potassium 4.7 chloride 99 CO2 of 22. Albumin 4.8 bilirubin 1.2 which is normal and direct bilirubin 0.28 suggesting that you have Gilbert's syndrome or that indirect hyperbilirubinemia that is benign and present about 10% of people. Alkaline phosphatase down to 187 from 202 and down previously from 230.  AST of 24 down from 36 and prior 49. ALT 33 down from 41 and ALT 76.  She had a prior bump in lfts from working on epoxys.  Making cups.  She also asked re asa and crestor and  she has not started the aspirin yet and she was to start to crestor and she will start post the next labs.  March 21 labs show glucose still slightly up at 104 previously 106.  BUN of 7 and creatinine down to 1.01 from 1.19 so that was better.  Sodium 136 potassium 4.2 chloride 101 CO2 slightly low at 18. Total bilirubin normal at 1.1 previously 1.2 direct bilirubin 0.29 and I would suggest that you have Gilbert's.  Gilbert's as you recall is that indirect hyperbilirubinemia that is very common and present about 10% of people. The alkaline phosphatase is down to 202 from 230 and the AST is down to 36 from 49 and ALT 41 from 76.  It would appear that you change something? White blood cell count 9.7 hemoglobin 14.1 platelet count 444 down from 499. Important to note the white cell count also came down from 13.4 to 9.7.  Neutrophils 6.8 and lymphocytes 2.1 both normal. Called pt re the labs. She says did not change meds. 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   March 9 labs show white blood cell count 13.4 up from 11.7 and prior 10.7.  Hemoglobin 14.6.  Platelet count up a little at 499 from 445.  It has been variable before though.  MCV normal at 90.  Neutrophils up at 9.5 and lymphocytes 2.5 down from last time. Glucose 106 slightly up and BUN of 9 creatinine slightly higher at 1.9 and previously 1.15.   Sodium 137 potassium 4.1 chloride 100 CO2 slightly low at 19.  Albumin 4.0.  Total bilirubin 1.2 normal.  Direct bilirubin 0.35.  Alkaline phosphatase up to 233.  AST 49 and ALT 76 and previously AST 29 ALT 40. Called pt as labs trending up. Meds: propanol, furosemide, spirinolactone, basagalar, Spiriva and Symbicort, Geodon and lamotrigine. Ursodiol. Lamotrigine 1% risk for incl fts and on for a year. Geodon: category c for liver injury and occurs within 1-4 weeks and she has been on it for 6-8 weeks. She will discuss with psych re this.   March 3 labs show liver alkaline phosphatase fraction is 76% bone fraction is 24% and intestinal 0%  so clearly the alkaline phosphatase is coming mainly from liver.  This is the normal pattern that one would expect. March 3 labs also show albumin slightly up at 4.9 from 4.7.  Could reflect hydrational status. Total bilirubin normal at 1.2 and direct bilirubin 0.32 suggesting a Gilbert's syndrome which is a benign finding. Alkaline phosphatase remains elevated at 203 and previously was 175.  AST 29 and ALT 40 and previously AST 20 and ALT 34. Unfortunately these labs arrived very late so we need to go over with you what medicines you are on and since when.  They are not dramatically worse but they are definitely drifting up.  February 24 labs show white blood cell count elevated at 11.7 previously was 10.7.  Hemoglobin 14.5 normal platelet count 445 down from 468.  MCV 92.  Neutrophils elevated at 7.1 and lymphocytes 3.4.  Have you been ill?  Glucose 107 BUN of 12 creatinine 1.15 down from 1.26.  Normal is from 0.57 up to 1.0 so still elevated.  Sodium 137 k 3.8 chloride 98 CO2 of 22.  Albumin 4.7 normal now.  Limit 0.8 direct bilirubin 0.18 and alkaline phosphatase elevated at 175.  Previously 107.  AST 20 and ALT 34.  Previously AST 26 and ALT 30.  Need to watch this evolving alkaline phosphatase trend. Called pt: she had surgery on the right elbow and so this could be from bone and had moved ulnar nerve. She also is on some prednisone and she is on prednisone 2.5mg po qd. We need to fractionate the alkaline phosphatase. We can redo the labs next week to check on that.  February 2 labs show white blood cell count 10.7 hemoglobin 14.4 platelet count up at 468.  MCV was 90 which is normal.  Neutrophils 6.3 and lymphocytes elevated at 3.3. Glucose slightly up at 102 BUN of 13 creatinine elevated at 1.26 sodium 139 potassium 3.9 chloride 99 CO2 of 22 albumin elevated at 4.9 bilirubin 0.8 direct bilirubin 0.21 alkaline phosphatase 107 AST 26 and ALT 30 so clearly that must have been that herbal smooth product that you were on and the other herbals and we can reassess from there. Called pt: she had ketorolac also. Start the ursodiol 300mg po bid with food. She will do that and follow labs.  Started atorvastatin since dec 2020 and she is still on that for now and then to do the crestor,  January 4 local labs sent in. Glucose 159 elevated please share with primary provider.  He had a 16 creatinine 0.96 sodium 138 potassium 4.2 chloride 99 calcium 9.9 total bili 0.4 alkaline phosphatase 202 and . Prior December 29 labs showed alk phos 114 AST 28 and ALT 45 so clearly those numbers bumped. White blood cell count 8.4 hemoglobin 13.2 platelet count 350. Cholesterol 226 elevated.  Triglyceride 284 elevated.   elevated.  Hemoglobin A1c slightly better at 7.9 from 8.1%.  TSH 1.78.  Magnesium 2.2. Called pt: need new labs to see what labs doing.  Dec 29 labs: ast 48 and alt 31 and alk 111 and tb 0.4.  December 13 2021  labs show white blood  cell count elevated 11.9 but down from 12.6.  Hemoglobin 15 and platelets up at 503 and previously 394.  That can sometimes go up with inflammation or medicines.  Please be sure to share this info with primary providers. MCV normal at 90.  Neutrophils elevated 7.4 but down from 10.1 back on December 2.  Lymphocytes normal at 3.1 and monocytes slightly up at 1.0. Glucose elevated at 149 and was previously 114.  Hemoglobin A1c up to 8.1 and was previously 6.3.  You should look at this sugar issue again with your primary provider as that is unusual for you. BUN of 12 creatinine 1.19 which is up from 1.014 sodium 136 potassium 4.2 chloride 98 CO2 20. Calcium at 10.4 and was previously normal at 9.6 and I wonder if it is medicine related? Albumin 4.6 bilirubin 0.8 alkaline phosphatase slightly higher at 130 previously 126. AST down to 32 and ALT down to 49 previously AST 65 and ALT 66. C-reactive protein noted to be normal at 2.  Dec 1 labs much better: Tp 6.9 and alb 4.2 and tb 0.2 and db 0.10 and alk 111 and ast 19 and alt 43 and down from prior alk 301 and ast 77 and alt 171. So doing much better. Keep track of any new meds added or removed.  November 98 labs show 123 which is elevated BUN of 18 creatinine 0.99 sodium 137 potassium 5.1 chloride 99 CO2 of 24 albumin 4.7 bilirubin 0.5 alkaline phosphatase down to 118 from 194.  AST 29 and previously 21 ALT down to 46 from 53.  So clearly centimeters drop. She says she had increased the Seroquel and then being weaned on it now. She is now on 50 mg of Seroquel and she is to stop it tonight and she will redo labs in 1-2 weeks to be sure.  Ca 10.3 little up and she is not on supplements for this and follow course. She will let primary md know.  Oct 12: alb 4.5 and tb 0.6 and db 0.19 and alk 301 and ast 77 and alt 171 and prior ast 33 and alt 52. went to 25 and 50 and now 100mg.  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 Says taking dilaudid and ibuprofen for that. She says taking a dilaudid occ and one ibuprofen. 10-1 labs also off. Still on steroids 10mg po qd for the rheum arthritis so enbrel stopped working and went to this. She did myasthenia screen and neg and started on this and trying to do humira sunday.   10-1 alk 199 and ast 56 and alt 149.   September 21 ultrasound showed the liver to be 15.6 cm with mild hepatic steatosis but no focal mass. Liver vessels are noted to be patent. Prior cholecystectomy changes seen. No bile duct dilation seen with common bile duct 5 mm. Visualized pancreas portions were unremarkable proportions of the head and tail the pancreas were not seen due to gas. Right kidney 10.6 cm and left kidney 10.8 cm with no hydronephrosis. Spleen normal at 8.5 cm. Mild liver fat overall was seen patent vessels were seen. No ascites was seen.  September 21 labs show albumin 4.9 which remains slightly up bilirubin 0.4 which is down from 0.8 direct bilirubin 0.13 which is down from 0.23. Alkaline phosphatase down to 198 from 207 AST slightly up at 33 from 28 ALT 52 down from 66.  September 9 redo labs show albumin stable 4.9 bilirubin 0.8 direct 0.23 alkaline phosphatase slightly lower at 207 from 215. AST down to 28 from 61. ALT down to 66 from 86.  So it does appear the labs are dropping with lamictal drop 150 to 100mg and then went up on seroquel. Sept 7 labs: alb 4.8 and tb 0.7 and db 0.2 and alk 215 and Ast 61 and alt 86.  Prior aug 27 albs ast 18 and alt 28 and alk 135. that was pre lamictal changed.  She had prior drinking green tea for a few weeks. She has an immune issue and so the labs spiked up. then stopped and labs settled last year off that.  Pt was to do a bx and trouble breathing and Dr Self told to do barium swallow and manometry and did the barium and to do the manometry and says told was ok.  Ent saw some plaques and she had bx done.  Pelvic area pain noted and se started with pain and went to er and went [facility] to [facility] and she said cyst in fallopian tube and ruptured and did a full hysterectomy and that was July 28 2021.  Ex  burned her citizenship certificate. She said she applied to get it. 555.00 for that.  Enbrel started earlier for seronegative RA.  She says needed onc re wbc up and asked him to do the lfts and she says part of labs back and she has from aug 8 2021. Ast and alt done and alk 209 and prior 138 and ast 149 and alt prior 34 and alt 231 and 76 july 19. b12 1804 and IGA 68 little low.  May 3:wbc 9.6 hg 13.7 and plat 355 and mcv 93. Neutrophils 6.5. glu 92 and cr 0.95 and na 137 and k 4.3 and ca 9.8 and alb 4.5 and tb 0.3 and alk 177 elevated and ast 30 and alt 70 and tesosterone level 6 normal per chart for your age. Prior alk 277 and ast 126 and alt 315. So came down post that doxycycline exposure.  April 6, 2021 [facility] at [facility]: pancreas appears normal where seen and liver demonstrates normal size and contour and echogenicity. Liver vessels patent. Gallbladder absent. Common bile duct 4mm. No hydronephrosis seen to kidneys. Spleen 8.4cm. No liver mass seen.  Jan 11: glu 171 elevated and show local md. cr 1.06 and na 137 and k 4.0 and cl 98 and co2 21 and ca 9.7 normal and alb 4.6 and tb 0.7 and alk 88 and ast 13 and alt 16. So liver labs doing very well. A1c 5.9% noted.  Jan 20 2021: ct no cardiomegaly. Clear lung bases. Unremarkable liver. Prior gb surgery changes. Unremarkable spleen. Pancreas and and adrenal glands normal. cyst right kidney. left renal cyst. Mild rectosigmoid wall thickening. Liver vessels patent. No bulk adenopath.  Nov 9 2020 labs: wbc 13. 4hg 14 and plat 453 elevated slightly (150- 450 normal). na 135 k 4.6 and cl 97 and co2 26 and calcium elevated 11.0.  Show to local md: why is calcium up?alb 4.5 and tb 0.6 and alk 165 elevated and ast 22 and alt 36 so back down. chol 336 elevated and trg 307 and ldl 219. Lipids off and not sure if due to recent liver flare or other and may want to redo that as known if liver flared that this can be off. Vit d low 28.8. a1c 5.9%.  She says has had low vit d and she was to start this and she was started on 5000 a day for 4 m and she had been taking some vit d. She says she had stopped it for surgery July and restarted it again.  She says was on doxycycline and said had a prior hx of tcn reaction and filled and she said she may have been more ill from it. No reexposure.  Nov 3 2020 : ast 50 and alt 121 (both down then from 121 and 222). main she is off green tea and the lamictal also off prior. Suspect green tea was immune stimulant reving up your immune system. She has a very active immune system.  Rheum says she has fibromyalgia and doing water therapy.  11-2-20: ast down to 50 and alt 118 and prior 121 and alt 222 . alk 213 still up from 210 and tb down to 0.4 from 0.5.  Oct 26 labs tb 0.5 and alk 210 and ast 121 and alt 222. alk 248 and so now 210 so that is better, and ast 128 and that is now 121 and alt was 248 and now 222 so better and we need to follow.  Oct 19 labs spiked again: alk 198 and ast 128 and alt 248. Tb 0.5. na 136 and k k 4.5. bun 15 and cr 0.94 and glu 112.   Oct 6 ast 16 and alt 25 and alk 111. Prior she was also on abx for 6 weeks and so finished oct 8 or so and so that not the cause.  Oct 6: wbc 11.2 little up, hg 14.4 and hct 42.2, plat 141. Mcv 90, glu 103 little up and maybe not fasting. Bun 20 and cr 0.98 and na 135 and k 4.5, cl 99 and co2 22. Alb 4.2 and tb 0.5 and alk 111 and ast 16 and alt 25.  Prior visit lamictal came down from 75/50 to 50/25 as of oct 9 and added gabapentin 400mg and seroquel 25mg po qhs an atorvastatin 40mg po qhs.  Off the prior nasal exposures also.  Aunt Ewelina passed in oct 2020 had oltx and she had failed from 2nd tx. Not from covid 19.  [facility].s aug 2020 [facility]: they saw mild fatty infiltration of the liver. Prior gb surgery changes seen. Visualized pancreas portions normal. No liver masses. Common duct 4mm. Portal vein patent. Kidneys unremarkable. Spleen 7.8cm unremarkable. Should get better as time passes from the issues we discussed.  Aug 4 2020 labs: glu 61 and little low bun 17 and cr 0.91 and na 135 and k 5.2 and cl 96 and co2 20 and ca 10.9 elevated and show local md. alb 4.7 and tb 0.6 and alk 104 and ast 24 and alt 28.  She was on ursodiol for has been on it for her liver started july 29 and she did stop it . She is staying off this as last time ok and may need it.  Nov 25 2020: wbc 11.3 and hg 14.1 plat 484 elevated and neutrophils 7.7 and glu 144 elevated and cr 1.08 little up.na 139 and k 4.3 and cl 99 and co2 24 and ca 10 and alb 4,7 and tb 0.3 and alk 130 and ast 15 and alt 18. Addendum: 1. Smoothie immune booster did. 2. Michelle took that. 3. She will confirm medicine.  Last time had been down 120 and now 110 in aug.   Plan: 1.  She is now sobriety at almost 5 months. 2. SHe did labs and sending in. 3, She needs u,s in Feb. 4, Pending the labs do in Jan,  Addendum: Oct 20 2023 glu 129 and bun 9 and cr 0.9 and na 140 and k 5.0 and cl 101 and co2 23 and ca 10.3 and alb 4.6 and tb 0.9 and alk 149 and ast 23 and alt 39.   She says some meds change and will do labs next week and in Jan when does the u.s.  Duration of visit was  min with 10 min of prep and loading to ecw and then 20 min by healow video with time spent reviewing chart and events with the patient and in discussing plans.

## 2024-02-29 ENCOUNTER — TELEP (OUTPATIENT)
Dept: URBAN - METROPOLITAN AREA TELEHEALTH 2 | Facility: TELEHEALTH | Age: 49
End: 2024-02-29

## 2024-02-29 RX ORDER — LISDEXAMFETAMINE DIMESYLATE 60 MG/1
1 TABLET IN THE MORNING TABLET, CHEWABLE ORAL ONCE A DAY
Status: ACTIVE | COMMUNITY

## 2024-02-29 RX ORDER — VARENICLINE 0.03 MG/.05ML
1 SPRAY IN EACH NOSTRIL SPRAY NASAL TWICE A DAY
Status: ACTIVE | COMMUNITY

## 2024-02-29 RX ORDER — VORTIOXETINE 20 MG/1
1 TABLET TABLET, FILM COATED ORAL ONCE A DAY
Status: ACTIVE | COMMUNITY

## 2024-02-29 RX ORDER — BUDESONIDE, GLYCOPYRROLATE, AND FORMOTEROL FUMARATE 160; 9; 4.8 UG/1; UG/1; UG/1
2 PUFFS AEROSOL, METERED RESPIRATORY (INHALATION) TWICE A DAY
Status: ACTIVE | COMMUNITY

## 2024-02-29 RX ORDER — ATORVASTATIN CALCIUM 20 MG/1
1 TABLET TABLET, FILM COATED ORAL ONCE A DAY
Status: ACTIVE | COMMUNITY

## 2024-02-29 RX ORDER — ZIPRASIDONE HYDROCHLORIDE 60 MG/1
1 CAPSULE WITH FOOD CAPSULE ORAL ONCE A DAY
Status: ACTIVE | COMMUNITY

## 2024-02-29 RX ORDER — URSODIOL 300 MG/1
1 CAPSULE CAPSULE ORAL
Qty: 60 | Refills: 1 | Status: ACTIVE | COMMUNITY

## 2024-02-29 NOTE — HPI-TODAY'S VISIT:
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  She says she did labs 1m ago [facility]. Dear Chanda Sarmiento,  January 5, 2024 ultrasound finally was routed to your chart. Part of the problem was the name change (they has [place] listed) and  I think that threw them off even though the date of birth is the same. Pancreas was unremarkable. Liver was homogeneous/even in echotexture and with no discrete lesions. Liver vessels were patent as expected. Common bile duct was normal at 3 mm. Right kidney was 10.2 cm with no hydronephrosis. Spleen normal at 7.5 cm. Splenic vessels patent. Looking forward to seeing you at the end of the month. Dr. Issa  January 2 labs show WBC normal at 8.8 hemoglobin 15.2 platelet count 376 MCV 94. Neutrophils and lymphocytes both normal. Glucose slightly low at 66 with normal for that lab being 70-99. BUN of 10 creatinine 0.78 sodium 143 potassium 4.3 calcium 10.2 albumin 4.6 bilirubin 0.5 and only your alkaline phosphatase was elevated at 181. AST was normal at 18 and ALT was normal at 28. Prior labs that you did in September 18 had shown an alk phos of 5172 and your AST 23 and ALT 35 so these appear to be lower now than then. Unclear why you are alk phos is isolated up and we may want to talk about fractionating your alkaline phosphatase. It is still possible that a medication could be causing that to be off as well so lets go over your medicine list at the upcoming visit and review these options with you.  Please share with local providers. Dr. Issa  September 18 sent in. Glucose was slightly up at 102 BUN of 14 creatinine 0.86 sodium 140 potassium 4.7 calcium 10.2 albumin 4.4 bilirubin 0.4 alk phos elevated at 172 and back in September 11 of been 167. Your AST was higher at 23 and ALT higher at 35 and previous in September 11 your AST was 13 and ALT of 17. Sed rate was noted to be 2 and C-reactive protein was 1 and PTH was 33. September 11 labs show WBC 7.7 hemoglobin elevated 16.7 plate count 423 MCV 93 with a BUN of 9 creatinine 0.96 sodium 142 potassium 5.2 elevated calcium of 10.6. AST then again 13 and ALT of 17 alk phos 167. Cholesterol was up at 270 and triglycerides were up at 378 and HDL 48 and  so those were definitely off. INR normal at 1.0. B12 normal at 637 and folate normal at 9.9. Hemoglobin A1c slightly up at 5.8 so you need to watch that as you may be moving into prediabetes. TSH 1.67. Uric acid was 3.2. For comparison back in February your AST was 25 and ALT 29 alk phos 161. Very important to see what are you doing differently between September 11 and the September 18 labs.  Full labs from July 22 sent in. You read me the essential ones but the full labs showed glucose 72 BUN of 8 creatinine 0.64 sodium 137 potassium 4.2 chloride 106 CO2 of 22 BUN 4.4 bilirubin 0.7 alk phos elevated at 172 AST 24 and ALT 20. Back in May your alk phos of been 157 so that appears to be a little higher. TSH 0.76. WBC 7.1, hemoglobin up at 16.6 hematocrit up at 50.8 plate count 436 elevated MCV 99. Neutrophils 3479 lymphocytes 2918. Hep B core total negative. RPR negative. HIV test negative. Hep B surface antigen negative. Hep C antibody negative. July 22 drug screen had been positive for alcohol metabolites, amphetamine, and marijuana.  She says she did well on therapy and she has done program and staying off the alcohol.  August 30 ultrasound shows liver echogenicity to be normal with no focal lesions. Common bile duct was normal at 3 mm. Prior cholecystectomy changes were seen. No ascites was seen. Visualized pancreas portions appeared unremarkable. Liver vessels were patent. Spleen was normal at 7.4 cm. Right kidney 10.2 cm with no hydronephrosis. In summary you have a normal-appearing liver and patent vessels.  Prior cholecystectomy changes were seen.  You mentioned she justy left rehab x 28 days and she says sent to [facility].  July 22: last ones: ast 24 and alt 20 and alk 172 up and hg 16.6 cr 0.64. She [facility] to [facility].  She has been off the alcohol now 41 day.  Labs may be better now as further than the last labs.  Pt last visit had beenm having some alcohol but she has cut back some and she is following with therapy and having 8 ounces and day and if have more than 2-3 drinks a day at risk for cirrhosis,  May 11 2023 glu 70 and and bun 11 and cr 0.68 and na 136 and k 4 and cl 99 and co2 26 and ca 10.3 and alb 4.6 and tb 1.1 and alk 157 and ast 34 and alt 60 and mg 2.1 and tsh 1.10 and wbc 9.3 and hg 15.9 and hct 45.5 and platelet 362, mcv 93.4  neutrophils 6371 and lymphs 2018.b432 and folate 14 vit d 78 and ethyl alcohol and over 18395.  Re meds: none in rehab and stratera 60mg qd and 40mg escitalopram and geodon 60mg po qd.  February 6 showed that the pancreas was unremarkable, the liver was homogeneous/even in echotexture with no discrete lesions. Liver vessels were patent. Common bile duct was normal at 2 mm.  Right kidney was 10.8 cm with no hydronephrosis. Spleen was normal at 7.9 cm with splenic vessels patent.  Pt says she was drinking more in 2020 and not until this past year. 6m of this,  Smoking 1/2 ppd and need to work on this.  She says mother is doing study with [place]. Doing a clinical study for her lungs and for chronic bronchitis.  She says she had labs done last week. Rheum saw.  2-7-2023 and wbc 8.6 hg 15 and plat 387 and neutrophils 4.9 and lymph 2.7 and gly 89 and bun 11 and cr 0.85 and na 140 and k 4.2 and cl 99 and co2 24 and alb 4.7 and tb 0.9 and alk 161 and ast 25 and alt 29.  Alcohol could not sense with the vyvance.  She was changed from adderall to vyvanse and maybe for 3m. She is on ursodiol 300mg po tid.  9/14/22 labs were sent to us. White blood cell count 10.8, hemoglobin 13.2, MCV 95, platelets 337, lymphocytes 2.3, neutrophils 7.3 which is slightly elevated please make sure your primary care doctor is aware. Glucose 98, creatinine 0.65, sodium 142, potassium 4.1, bilirubin 0.5, alkaline phosphatase 146, AST 17, ALT 35. The alkaline phosphatase and the AST are trending in the right direction. Slight increase in the alt, previousl it was 31. We will continue to monitor this. Also of note the protein was slightly low at 5.9 , please share with your primary care.  Aug 5: glu 93 and bun 6 and cr 0.92 and na 141 and k 4.3 and cl 102 and co2 23. Wbc 10.4 and hg 14.6 and hct 44.3 and plat 404.  Mcv 95. Neutrophils 7.3 little up and prior normal 6.8. Lymphs 2.1. Alb 4.4 and tb 0.8 and db 0.22 and alk 150 ast 27 and alt 23.  Prior June 27: alk 183 and ast 18 and alt 31. So labs trending right way with some variation in ast and alt but still in normal range for ast and alt. No clear acute issues.  We will contact [facility] for the u.s done feb 3.  Aug 5  ultrasound shows liver normal in contour and echogenicity.   No suspicious liver lesions. Common bile duct was normal at 2.4 mm and gallbladder absent. Right kidney 9.6 cm with no hydronephrosis. Spleen normal at 8.6 cm. Imaged pancreas portions unremarkable with tail partially not well seen due to gas. Liver vessels patent. Overall good to see that there was no focal lesions and no suspicion for any fibrotic morphology.  She started to drink again and she is having like tequila and has 1 ounce and doing 6 ounces on a rare day and 3 ounces a day. Started 1 month ago.  Pt says labs in Jan 2022 were up and she was ordered a medicine that had diclofenac and Geodon in Jan. She is stil on the geodon at 40mg.  June 27 labs show glucose normal at 86 BUN of 10 creatinine 0.93 down from 0.94.  Sodium 141 potassium 3.9 chloride 103 CO2 24. White blood count still up at 11 but down from 13.5 with.  Hemoglobin 13.2 which is stable.  Platelet count normal at 363 from 411.  MCV normal at 92.  Neutrophils normal at 6.8 and lymphocytes 3.0. Albumin 4.1 bilirubin 1.1 direct bilirubin 0.3.  Alk phos down to 183 from 213.  AST down to 18 and ALT down to 31 from previous AST 17 and ALT 35.  Ideal ALT is less than 25.   June 8 labs showed glucose elevated at 123 and previously was normal but has occasionally been elevated as back in May 3 it was 108.  Please share with primary provider.  Creatinine down to 0.94 from 1.15 so doing better there.  Sodium 138 potassium 4.2 chloride 103 CO2 of 22. White blood  cell count up at 13.5 from 10.3.  Were you ill recently?  Hemoglobin 13.2 platelet count down to normal at 411 from 452.  Neutrophils up at 10.8 and again wonder if he had a recent infection or vaccine?  Lymphocytes 2.1 normal. Albumin 4.5 bilirubin 0.6 which is stable direct bilirubin 0.2.  Alkaline phosphatase slightly lowered to 13 from 220 AST low at 17 from 21 previously 38.  ALT still slightly up at 35 but previously 35 and back on May 3 have been 65. Overall liver labs are dropping but slowing down on that. Any new meds? Maybe role of recent illness?  May 19 labs show glucose down to 97 from 108 before.  BUN of 10.  Creatinine still remains slightly up at 1.15 from 1.13.  He to stay hydrated with the increasing temperatures. Sodium 135 potassium 4.7 chloride 96 CO2 of 21. White blood cell count 10.3 hemoglobin 14.1 platelet count slightly up at 452 and was previously 430.  MCV 92.  Neutrophils 6.4 lymphocytes 2.7 normal. Albumin 4.6 bilirubin 0.6 which is down from 1.0 and prior 1.4.  Direct bilirubin 0.21.  Alk phos remains at 220 was previously 220 and prior 180.  AST down to 21 and previously 38.  ALT down to 35 from previously 65.  May 3 labs show glucose slightly up at 108 and may be not fasting that day?  BUN of 12 creatinine elevated at 1.13 previously 1.06.  Sodium 139 potassium 4.5 chloride 101 CO2 21.  The chloride and CO2 are back to normal which they were off last time. White blood cell count down to 10 hemoglobin 14.8 platelet count 430 MCV 90 neutrophils normal at 6.5 lymphocytes normal now at 2.5. Albumin 4.7 also down from 5.0.  Bilirubin normal now at 1.0 alkaline phosphatase still elevated though at 220 previously was 180.  AST was 38 and ALT 65.  Previously AST 27 and ALT 44. Called pt and no new meds. Did drop citalopram to 10mg po qd and she is now off. Soumya 300mg po tid for 2 weeks or so. Asked if gaining weight and she is not. Smoking a lot of cigarettes and I have seen that before but not common to see. She will work on this and see how she does. She will try to work on that. Redo the labs in 2 weeks.  She stopped her lisinopril and changed to amlodipine and did not start as bp is low. 95/71.  January 19, 2022 ultrasound sent in. Liver appeared to be increased abd coarsened in regards to echogenicity.  No definite liver lesions.  No dilated ducts. Normal hepatopetal flow seen in the main portal vein.  Imaged hepatic veins also were patent. Common bile duct normal at 5 mm. Gallbladder absent. Right kidney 10.6 cm with no hydronephrosis. Left kidney 10.5 cm with no hydronephrosis.  Spleen normal at 8.7 cm. They suspected that the liver could have moderate diffuse fatty infiltration.  I would state that this was also at the time that you are having a medication effect so it could be that threw that reading off.     April 4, 2022 labs show glucose 93 which is normal BUN of 9 creatinine slightly up at 1.05 and was previously 1.01. May want to look at your hydrational state. Sodium 139 potassium 4.7 chloride 99 CO2 of 22. Albumin 4.8 bilirubin 1.2 which is normal and direct bilirubin 0.28 suggesting that you have Gilbert's syndrome or that indirect hyperbilirubinemia that is benign and present about 10% of people. Alkaline phosphatase down to 187 from 202 and down previously from 230.  AST of 24 down from 36 and prior 49. ALT 33 down from 41 and ALT 76.  She had a prior bump in lfts from working on epoxys.  Making cups.  She also asked re asa and crestor and  she has not started the aspirin yet and she was to start to crestor and she will start post the next labs.  March 21 labs show glucose still slightly up at 104 previously 106.  BUN of 7 and creatinine down to 1.01 from 1.19 so that was better.  Sodium 136 potassium 4.2 chloride 101 CO2 slightly low at 18. Total bilirubin normal at 1.1 previously 1.2 direct bilirubin 0.29 and I would suggest that you have Gilbert's.  Gilbert's as you recall is that indirect hyperbilirubinemia that is very common and present about 10% of people. The alkaline phosphatase is down to 202 from 230 and the AST is down to 36 from 49 and ALT 41 from 76.  It would appear that you change something? White blood cell count 9.7 hemoglobin 14.1 platelet count 444 down from 499. Important to note the white cell count also came down from 13.4 to 9.7.  Neutrophils 6.8 and lymphocytes 2.1 both normal. Called pt re the labs. She says did not change meds. 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   March 9 labs show white blood cell count 13.4 up from 11.7 and prior 10.7.  Hemoglobin 14.6.  Platelet count up a little at 499 from 445.  It has been variable before though.  MCV normal at 90.  Neutrophils up at 9.5 and lymphocytes 2.5 down from last time. Glucose 106 slightly up and BUN of 9 creatinine slightly higher at 1.9 and previously 1.15.   Sodium 137 potassium 4.1 chloride 100 CO2 slightly low at 19.  Albumin 4.0.  Total bilirubin 1.2 normal.  Direct bilirubin 0.35.  Alkaline phosphatase up to 233.  AST 49 and ALT 76 and previously AST 29 ALT 40. Called pt as labs trending up. Meds: propanol, furosemide, spirinolactone, basagalar, Spiriva and Symbicort, Geodon and lamotrigine. Ursodiol. Lamotrigine 1% risk for incl fts and on for a year. Geodon: category c for liver injury and occurs within 1-4 weeks and she has been on it for 6-8 weeks. She will discuss with psych re this.   March 3 labs show liver alkaline phosphatase fraction is 76% bone fraction is 24% and intestinal 0%  so clearly the alkaline phosphatase is coming mainly from liver.  This is the normal pattern that one would expect. March 3 labs also show albumin slightly up at 4.9 from 4.7.  Could reflect hydrational status. Total bilirubin normal at 1.2 and direct bilirubin 0.32 suggesting a Gilbert's syndrome which is a benign finding. Alkaline phosphatase remains elevated at 203 and previously was 175.  AST 29 and ALT 40 and previously AST 20 and ALT 34. Unfortunately these labs arrived very late so we need to go over with you what medicines you are on and since when.  They are not dramatically worse but they are definitely drifting up.  February 24 labs show white blood cell count elevated at 11.7 previously was 10.7.  Hemoglobin 14.5 normal platelet count 445 down from 468.  MCV 92.  Neutrophils elevated at 7.1 and lymphocytes 3.4.  Have you been ill?  Glucose 107 BUN of 12 creatinine 1.15 down from 1.26.  Normal is from 0.57 up to 1.0 so still elevated.  Sodium 137 k 3.8 chloride 98 CO2 of 22.  Albumin 4.7 normal now.  Limit 0.8 direct bilirubin 0.18 and alkaline phosphatase elevated at 175.  Previously 107.  AST 20 and ALT 34.  Previously AST 26 and ALT 30.  Need to watch this evolving alkaline phosphatase trend. Called pt: she had surgery on the right elbow and so this could be from bone and had moved ulnar nerve. She also is on some prednisone and she is on prednisone 2.5mg po qd. We need to fractionate the alkaline phosphatase. We can redo the labs next week to check on that.  February 2 labs show white blood cell count 10.7 hemoglobin 14.4 platelet count up at 468.  MCV was 90 which is normal.  Neutrophils 6.3 and lymphocytes elevated at 3.3. Glucose slightly up at 102 BUN of 13 creatinine elevated at 1.26 sodium 139 potassium 3.9 chloride 99 CO2 of 22 albumin elevated at 4.9 bilirubin 0.8 direct bilirubin 0.21 alkaline phosphatase 107 AST 26 and ALT 30 so clearly that must have been that herbal smooth product that you were on and the other herbals and we can reassess from there. Called pt: she had ketorolac also. Start the ursodiol 300mg po bid with food. She will do that and follow labs.  Started atorvastatin since dec 2020 and she is still on that for now and then to do the crestor,  January 4 local labs sent in. Glucose 159 elevated please share with primary provider.  He had a 16 creatinine 0.96 sodium 138 potassium 4.2 chloride 99 calcium 9.9 total bili 0.4 alkaline phosphatase 202 and . Prior December 29 labs showed alk phos 114 AST 28 and ALT 45 so clearly those numbers bumped. White blood cell count 8.4 hemoglobin 13.2 platelet count 350. Cholesterol 226 elevated.  Triglyceride 284 elevated.   elevated.  Hemoglobin A1c slightly better at 7.9 from 8.1%.  TSH 1.78.  Magnesium 2.2. Called pt: need new labs to see what labs doing.  Dec 29 labs: ast 48 and alt 31 and alk 111 and tb 0.4.  December 13 2021  labs show white blood  cell count elevated 11.9 but down from 12.6.  Hemoglobin 15 and platelets up at 503 and previously 394.  That can sometimes go up with inflammation or medicines.  Please be sure to share this info with primary providers. MCV normal at 90.  Neutrophils elevated 7.4 but down from 10.1 back on December 2.  Lymphocytes normal at 3.1 and monocytes slightly up at 1.0. Glucose elevated at 149 and was previously 114.  Hemoglobin A1c up to 8.1 and was previously 6.3.  You should look at this sugar issue again with your primary provider as that is unusual for you. BUN of 12 creatinine 1.19 which is up from 1.014 sodium 136 potassium 4.2 chloride 98 CO2 20. Calcium at 10.4 and was previously normal at 9.6 and I wonder if it is medicine related? Albumin 4.6 bilirubin 0.8 alkaline phosphatase slightly higher at 130 previously 126. AST down to 32 and ALT down to 49 previously AST 65 and ALT 66. C-reactive protein noted to be normal at 2.  Dec 1 labs much better: Tp 6.9 and alb 4.2 and tb 0.2 and db 0.10 and alk 111 and ast 19 and alt 43 and down from prior alk 301 and ast 77 and alt 171. So doing much better. Keep track of any new meds added or removed.  November 98 labs show 123 which is elevated BUN of 18 creatinine 0.99 sodium 137 potassium 5.1 chloride 99 CO2 of 24 albumin 4.7 bilirubin 0.5 alkaline phosphatase down to 118 from 194.  AST 29 and previously 21 ALT down to 46 from 53.  So clearly centimeters drop. She says she had increased the Seroquel and then being weaned on it now. She is now on 50 mg of Seroquel and she is to stop it tonight and she will redo labs in 1-2 weeks to be sure.  Ca 10.3 little up and she is not on supplements for this and follow course. She will let primary md know.  Oct 12: alb 4.5 and tb 0.6 and db 0.19 and alk 301 and ast 77 and alt 171 and prior ast 33 and alt 52. went to 25 and 50 and now 100mg.  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 Says taking dilaudid and ibuprofen for that. She says taking a dilaudid occ and one ibuprofen. 10-1 labs also off. Still on steroids 10mg po qd for the rheum arthritis so enbrel stopped working and went to this. She did myasthenia screen and neg and started on this and trying to do humira sunday.   10-1 alk 199 and ast 56 and alt 149.   September 21 ultrasound showed the liver to be 15.6 cm with mild hepatic steatosis but no focal mass. Liver vessels are noted to be patent. Prior cholecystectomy changes seen. No bile duct dilation seen with common bile duct 5 mm. Visualized pancreas portions were unremarkable proportions of the head and tail the pancreas were not seen due to gas. Right kidney 10.6 cm and left kidney 10.8 cm with no hydronephrosis. Spleen normal at 8.5 cm. Mild liver fat overall was seen patent vessels were seen. No ascites was seen.  September 21 labs show albumin 4.9 which remains slightly up bilirubin 0.4 which is down from 0.8 direct bilirubin 0.13 which is down from 0.23. Alkaline phosphatase down to 198 from 207 AST slightly up at 33 from 28 ALT 52 down from 66.  September 9 redo labs show albumin stable 4.9 bilirubin 0.8 direct 0.23 alkaline phosphatase slightly lower at 207 from 215. AST down to 28 from 61. ALT down to 66 from 86.  So it does appear the labs are dropping with lamictal drop 150 to 100mg and then went up on seroquel. Sept 7 labs: alb 4.8 and tb 0.7 and db 0.2 and alk 215 and Ast 61 and alt 86.  Prior aug 27 albs ast 18 and alt 28 and alk 135. that was pre lamictal changed.  She had prior drinking green tea for a few weeks. She has an immune issue and so the labs spiked up. then stopped and labs settled last year off that.  Pt was to do a bx and trouble breathing and Dr Self told to do barium swallow and manometry and did the barium and to do the manometry and says told was ok.  Ent saw some plaques and she had bx done.  Pelvic area pain noted and se started with pain and went to er and went [facility] to [facility] and she said cyst in fallopian tube and ruptured and did a full hysterectomy and that was July 28 2021.  Ex  burned her citizenship certificate. She said she applied to get it. 555.00 for that.  Enbrel started earlier for seronegative RA.  She says needed onc re wbc up and asked him to do the lfts and she says part of labs back and she has from aug 8 2021. Ast and alt done and alk 209 and prior 138 and ast 149 and alt prior 34 and alt 231 and 76 july 19. b12 1804 and IGA 68 little low.  May 3:wbc 9.6 hg 13.7 and plat 355 and mcv 93. Neutrophils 6.5. glu 92 and cr 0.95 and na 137 and k 4.3 and ca 9.8 and alb 4.5 and tb 0.3 and alk 177 elevated and ast 30 and alt 70 and tesosterone level 6 normal per chart for your age. Prior alk 277 and ast 126 and alt 315. So came down post that doxycycline exposure.  April 6, 2021 u.s at [facility]: pancreas appears normal where seen and liver demonstrates normal size and contour and echogenicity. Liver vessels patent. Gallbladder absent. Common bile duct 4mm. No hydronephrosis seen to kidneys. Spleen 8.4cm. No liver mass seen.  Jan 11: glu 171 elevated and show local md. cr 1.06 and na 137 and k 4.0 and cl 98 and co2 21 and ca 9.7 normal and alb 4.6 and tb 0.7 and alk 88 and ast 13 and alt 16. So liver labs doing very well. A1c 5.9% noted.  Jan 20 2021: ct no cardiomegaly. Clear lung bases. Unremarkable liver. Prior gb surgery changes. Unremarkable spleen. Pancreas and and adrenal glands normal. cyst right kidney. left renal cyst. Mild rectosigmoid wall thickening. Liver vessels patent. No bulk adenopath.  Nov 9 2020 labs: wbc 13. 4hg 14 and plat 453 elevated slightly (150- 450 normal). na 135 k 4.6 and cl 97 and co2 26 and calcium elevated 11.0.  Show to local md: why is calcium up?alb 4.5 and tb 0.6 and alk 165 elevated and ast 22 and alt 36 so back down. chol 336 elevated and trg 307 and ldl 219. Lipids off and not sure if due to recent liver flare or other and may want to redo that as known if liver flared that this can be off. Vit d low 28.8. a1c 5.9%.  She says has had low vit d and she was to start this and she was started on 5000 a day for 4 m and she had been taking some vit d. She says she had stopped it for surgery July and restarted it again.  She says was on doxycycline and said had a prior hx of tcn reaction and filled and she said she may have been more ill from it. No reexposure.  Nov 3 2020 : ast 50 and alt 121 (both down then from 121 and 222). main she is off green tea and the lamictal also off prior. Suspect green tea was immune stimulant reving up your immune system. She has a very active immune system.  Rheum says she has fibromyalgia and doing water therapy.  11-2-20: ast down to 50 and alt 118 and prior 121 and alt 222 . alk 213 still up from 210 and tb down to 0.4 from 0.5.  Oct 26 labs tb 0.5 and alk 210 and ast 121 and alt 222. alk 248 and so now 210 so that is better, and ast 128 and that is now 121 and alt was 248 and now 222 so better and we need to follow.  Oct 19 labs spiked again: alk 198 and ast 128 and alt 248. Tb 0.5. na 136 and k k 4.5. bun 15 and cr 0.94 and glu 112.   Oct 6 ast 16 and alt 25 and alk 111. Prior she was also on abx for 6 weeks and so finished oct 8 or so and so that not the cause.  Oct 6: wbc 11.2 little up, hg 14.4 and hct 42.2, plat 141. Mcv 90, glu 103 little up and maybe not fasting. Bun 20 and cr 0.98 and na 135 and k 4.5, cl 99 and co2 22. Alb 4.2 and tb 0.5 and alk 111 and ast 16 and alt 25.  Prior visit lamictal came down from 75/50 to 50/25 as of oct 9 and added gabapentin 400mg and seroquel 25mg po qhs an atorvastatin 40mg po qhs.  Off the prior nasal exposures also.  Aunt Ewelina passed in oct 2020 had oltx and she had failed from 2nd tx. Not from covid 19.  U.s aug 2020 [facility]: they saw mild fatty infiltration of the liver. Prior gb surgery changes seen. Visualized pancreas portions normal. No liver masses. Common duct 4mm. Portal vein patent. Kidneys unremarkable. Spleen 7.8cm unremarkable. Should get better as time passes from the issues we discussed.  Aug 4 2020 labs: glu 61 and little low bun 17 and cr 0.91 and na 135 and k 5.2 and cl 96 and co2 20 and ca 10.9 elevated and show local md. alb 4.7 and tb 0.6 and alk 104 and ast 24 and alt 28.  She was on ursodiol for has been on it for her liver started july 29 and she did stop it . She is staying off this as last time ok and may need it.  Nov 25 2020: wbc 11.3 and hg 14.1 plat 484 elevated and neutrophils 7.7 and glu 144 elevated and cr 1.08 little up.na 139 and k 4.3 and cl 99 and co2 24 and ca 10 and alb 4,7 and tb 0.3 and alk 130 and ast 15 and alt 18. Addendum: 1. Smoothie immune booster did. 2. Michelle took that. 3. She will confirm medicine.  Last time had been down 120 and now 110 in aug.   Plan: 1.  She is now sobriety at almost 5 months. 2. SHe did labs and sending in. 3, She needs u,s in Feb. 4, Pending the labs do in Jan,  Addendum: Oct 20 2023 glu 129 and bun 9 and cr 0.9 and na 140 and k 5.0 and cl 101 and co2 23 and ca 10.3 and alb 4.6 and tb 0.9 and alk 149 and ast 23 and alt 39.   She says some meds change and will do labs next week and in Jan when does the u.s.  Duration of visit was  min with 10 min of prep and loading to ecw and then 20 min by healow video with time spent reviewing chart and events with the patient and in discussing plans.

## 2024-04-02 ENCOUNTER — LAB (OUTPATIENT)
Dept: URBAN - METROPOLITAN AREA TELEHEALTH 2 | Facility: TELEHEALTH | Age: 49
End: 2024-04-02

## 2024-04-02 ENCOUNTER — TELEP (OUTPATIENT)
Dept: URBAN - METROPOLITAN AREA TELEHEALTH 2 | Facility: TELEHEALTH | Age: 49
End: 2024-04-02
Payer: COMMERCIAL

## 2024-04-02 VITALS — BODY MASS INDEX: 20.43 KG/M2 | WEIGHT: 111 LBS | HEIGHT: 62 IN

## 2024-04-02 DIAGNOSIS — K71.9 DRUG-INDUCED LIVER DISEASE: ICD-10-CM

## 2024-04-02 DIAGNOSIS — K63.5 POLYP OF COLON, UNSPECIFIED PART OF COLON, UNSPECIFIED TYPE: ICD-10-CM

## 2024-04-02 DIAGNOSIS — K75.4 AUTOIMMUNE HEPATITIS: ICD-10-CM

## 2024-04-02 PROCEDURE — 99214 OFFICE O/P EST MOD 30 MIN: CPT

## 2024-04-02 RX ORDER — ZIPRASIDONE HYDROCHLORIDE 60 MG/1
1 CAPSULE WITH FOOD CAPSULE ORAL
Status: ACTIVE | COMMUNITY

## 2024-04-02 RX ORDER — LISDEXAMFETAMINE DIMESYLATE 60 MG/1
1 TABLET IN THE MORNING TABLET, CHEWABLE ORAL ONCE A DAY
Status: ACTIVE | COMMUNITY

## 2024-04-02 RX ORDER — URSODIOL 300 MG/1
1 CAPSULE CAPSULE ORAL
Qty: 60 | Refills: 1 | Status: ACTIVE | COMMUNITY

## 2024-04-02 RX ORDER — DIAZEPAM 5 MG/1
1 TABLET AS NEEDED TABLET ORAL ONCE A DAY
Status: ACTIVE | COMMUNITY

## 2024-04-02 RX ORDER — BUDESONIDE, GLYCOPYRROLATE, AND FORMOTEROL FUMARATE 160; 9; 4.8 UG/1; UG/1; UG/1
2 PUFFS AEROSOL, METERED RESPIRATORY (INHALATION) TWICE A DAY
Status: ACTIVE | COMMUNITY

## 2024-04-02 RX ORDER — VARENICLINE 0.03 MG/.05ML
1 SPRAY IN EACH NOSTRIL SPRAY NASAL TWICE A DAY
Status: ACTIVE | COMMUNITY

## 2024-04-02 RX ORDER — ATORVASTATIN CALCIUM 20 MG/1
1 TABLET TABLET, FILM COATED ORAL ONCE A DAY
Status: ACTIVE | COMMUNITY

## 2024-04-02 RX ORDER — VORTIOXETINE 20 MG/1
1 TABLET TABLET, FILM COATED ORAL ONCE A DAY
Status: ACTIVE | COMMUNITY

## 2024-04-02 RX ORDER — ZIPRASIDONE HYDROCHLORIDE 60 MG/1
1 CAPSULE WITH FOOD CAPSULE ORAL ONCE A DAY
Status: ACTIVE | COMMUNITY

## 2024-04-02 NOTE — HPI-TODAY'S VISIT:
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  She is working for [employer].  No recent labs and she says started Feb 19 and needs to get this now.  January 5, 2024 ultrasound finally was routed to your chart. Part of the problem was the name change (they has [place] listed) and  I think that threw them off even though the date of birth is the same. Pancreas was unremarkable. Liver was homogeneous/even in echotexture and with no discrete lesions. Liver vessels were patent as expected. Common bile duct was normal at 3 mm. Right kidney was 10.2 cm with no hydronephrosis. Spleen normal at 7.5 cm. Splenic vessels patent.  January 2 labs show WBC normal at 8.8 hemoglobin 15.2 platelet count 376 MCV 94. Neutrophils and lymphocytes both normal. Glucose slightly low at 66 with normal for that lab being 70-99. BUN of 10 creatinine 0.78 sodium 143 potassium 4.3 calcium 10.2 albumin 4.6 bilirubin 0.5 and only your alkaline phosphatase was elevated at 181. AST was normal at 18 and ALT was normal at 28. Prior labs that you did in September 18 had shown your alk 172  AST 23 and ALT 35 so these appear to be lower now than then. Unclear why you are alk phos is isolated up and we may want to talk about fractionating your alkaline phosphatase. It is still possible that a medication could be causing that to be off as well so lets go over your medicine list at the upcoming visit and review these options with you.  She says re the labs not sure what came off this.  Trintellix started in Dec and remains on it.  Oct 20 2023 glu 129 and bun 9 and cr 0.9 and na 140 and k 5.0 and cl 101 and co2 23 and ca 10.3 and alb 4.6 and tb 0.9 and alk 149 and ast 23 and alt 39.   September 18 sent in. Glucose was slightly up at 102 BUN of 14 creatinine 0.86 sodium 140 potassium 4.7 calcium 10.2 albumin 4.4 bilirubin 0.4 alk phos elevated at 172 and back in September 11 of been 167. Your AST was higher at 23 and ALT higher at 35 and previous in September 11 your AST was 13 and ALT of 17. Sed rate was noted to be 2 and C-reactive protein was 1 and PTH was 33. September 11 labs show WBC 7.7 hemoglobin elevated 16.7 plate count 423 MCV 93 with a BUN of 9 creatinine 0.96 sodium 142 potassium 5.2 elevated calcium of 10.6. AST then again 13 and ALT of 17 alk phos 167. Cholesterol was up at 270 and triglycerides were up at 378 and HDL 48 and  so those were definitely off. INR normal at 1.0. B12 normal at 637 and folate normal at 9.9. Hemoglobin A1c slightly up at 5.8 so you need to watch that as you may be moving into prediabetes. TSH 1.67. Uric acid was 3.2. For comparison back in February your AST was 25 and ALT 29 alk phos 161. Very important to see what are you doing differently between September 11 and the September 18 labs.  Full labs from July 22 sent in. You read me the essential ones but the full labs showed glucose 72 BUN of 8 creatinine 0.64 sodium 137 potassium 4.2 chloride 106 CO2 of 22 BUN 4.4 bilirubin 0.7 alk phos elevated at 172 AST 24 and ALT 20. Back in May your alk phos of been 157 so that appears to be a little higher. TSH 0.76. WBC 7.1, hemoglobin up at 16.6 hematocrit up at 50.8 plate count 436 elevated MCV 99. Neutrophils 3479 lymphocytes 2918. Hep B core total negative. RPR negative. HIV test negative. Hep B surface antigen negative. Hep C antibody negative. July 22 drug screen had been positive for alcohol metabolites, amphetamine, and marijuana.  She is 9 m without any alcohol.  August 30 ultrasound shows liver echogenicity to be normal with no focal lesions. Common bile duct was normal at 3 mm. Prior cholecystectomy changes were seen. No ascites was seen. Visualized pancreas portions appeared unremarkable. Liver vessels were patent. Spleen was normal at 7.4 cm. Right kidney 10.2 cm with no hydronephrosis. In summary you have a normal-appearing liver and patent vessels.  Prior cholecystectomy changes were seen.  You mentioned she justy left rehab x 28 days and she says sent to [facility].  July 22: last ones: ast 24 and alt 20 and alk 172 up and hg 16.6 cr 0.64. She [facility] to [facility].  She has been off the alcohol now 41 day.  Labs may be better now as further than the last labs.  Pt last visit had beenm having some alcohol but she has cut back some and she is following with therapy and having 8 ounces and day and if have more than 2-3 drinks a day at risk for cirrhosis,  May 11 2023 glu 70 and and bun 11 and cr 0.68 and na 136 and k 4 and cl 99 and co2 26 and ca 10.3 and alb 4.6 and tb 1.1 and alk 157 and ast 34 and alt 60 and mg 2.1 and tsh 1.10 and wbc 9.3 and hg 15.9 and hct 45.5 and platelet 362, mcv 93.4  neutrophils 6371 and lymphs 2018.b432 and folate 14 vit d 78 and ethyl alcohol and over 17540.  Re meds: none in rehab and stratera 60mg qd and 40mg escitalopram and geodon 60mg po qd.  February 6 showed that the pancreas was unremarkable, the liver was homogeneous/even in echotexture with no discrete lesions. Liver vessels were patent. Common bile duct was normal at 2 mm.  Right kidney was 10.8 cm with no hydronephrosis. Spleen was normal at 7.9 cm with splenic vessels patent.  Pt says she was drinking more in 2020 and not until this past year. 6m of this,  Smoking 1/2 ppd and need to work on this.  She says mother is doing study with [employer]. Doing a clinical study for her lungs and for chronic bronchitis.  She says she had labs done last week. Rheum saw.  2-7-2023 and wbc 8.6 hg 15 and plat 387 and neutrophils 4.9 and lymph 2.7 and gly 89 and bun 11 and cr 0.85 and na 140 and k 4.2 and cl 99 and co2 24 and alb 4.7 and tb 0.9 and alk 161 and ast 25 and alt 29.  Alcohol could not sense with the vyvance.  She was changed from adderall to vyvanse and maybe for 3m. She is on ursodiol 300mg po tid.  9/14/22 labs were sent to us. White blood cell count 10.8, hemoglobin 13.2, MCV 95, platelets 337, lymphocytes 2.3, neutrophils 7.3 which is slightly elevated please make sure your primary care doctor is aware. Glucose 98, creatinine 0.65, sodium 142, potassium 4.1, bilirubin 0.5, alkaline phosphatase 146, AST 17, ALT 35. The alkaline phosphatase and the AST are trending in the right direction. Slight increase in the alt, previousl it was 31. We will continue to monitor this. Also of note the protein was slightly low at 5.9 , please share with your primary care.  Aug 5: glu 93 and bun 6 and cr 0.92 and na 141 and k 4.3 and cl 102 and co2 23. Wbc 10.4 and hg 14.6 and hct 44.3 and plat 404.  Mcv 95. Neutrophils 7.3 little up and prior normal 6.8. Lymphs 2.1. Alb 4.4 and tb 0.8 and db 0.22 and alk 150 ast 27 and alt 23.  Prior June 27: alk 183 and ast 18 and alt 31. So labs trending right way with some variation in ast and alt but still in normal range for ast and alt. No clear acute issues.  We will contact [facility] for the u.s done feb 3.  Aug 5  ultrasound shows liver normal in contour and echogenicity.   No suspicious liver lesions. Common bile duct was normal at 2.4 mm and gallbladder absent. Right kidney 9.6 cm with no hydronephrosis. Spleen normal at 8.6 cm. Imaged pancreas portions unremarkable with tail partially not well seen due to gas. Liver vessels patent. Overall good to see that there was no focal lesions and no suspicion for any fibrotic morphology.  She started to drink again and she is having like tequila and has 1 ounce and doing 6 ounces on a rare day and 3 ounces a day. Started 1 month ago.  Pt says labs in Jan 2022 were up and she was ordered a medicine that had diclofenac and Geodon in Jan. She is stil on the geodon at 40mg.  June 27 labs show glucose normal at 86 BUN of 10 creatinine 0.93 down from 0.94.  Sodium 141 potassium 3.9 chloride 103 CO2 24. White blood count still up at 11 but down from 13.5 with.  Hemoglobin 13.2 which is stable.  Platelet count normal at 363 from 411.  MCV normal at 92.  Neutrophils normal at 6.8 and lymphocytes 3.0. Albumin 4.1 bilirubin 1.1 direct bilirubin 0.3.  Alk phos down to 183 from 213.  AST down to 18 and ALT down to 31 from previous AST 17 and ALT 35.  Ideal ALT is less than 25.   June 8 labs showed glucose elevated at 123 and previously was normal but has occasionally been elevated as back in May 3 it was 108.  Please share with primary provider.  Creatinine down to 0.94 from 1.15 so doing better there.  Sodium 138 potassium 4.2 chloride 103 CO2 of 22. White blood  cell count up at 13.5 from 10.3.  Were you ill recently?  Hemoglobin 13.2 platelet count down to normal at 411 from 452.  Neutrophils up at 10.8 and again wonder if he had a recent infection or vaccine?  Lymphocytes 2.1 normal. Albumin 4.5 bilirubin 0.6 which is stable direct bilirubin 0.2.  Alkaline phosphatase slightly lowered to 13 from 220 AST low at 17 from 21 previously 38.  ALT still slightly up at 35 but previously 35 and back on May 3 have been 65. Overall liver labs are dropping but slowing down on that. Any new meds? Maybe role of recent illness?  May 19 labs show glucose down to 97 from 108 before.  BUN of 10.  Creatinine still remains slightly up at 1.15 from 1.13.  He to stay hydrated with the increasing temperatures. Sodium 135 potassium 4.7 chloride 96 CO2 of 21. White blood cell count 10.3 hemoglobin 14.1 platelet count slightly up at 452 and was previously 430.  MCV 92.  Neutrophils 6.4 lymphocytes 2.7 normal. Albumin 4.6 bilirubin 0.6 which is down from 1.0 and prior 1.4.  Direct bilirubin 0.21.  Alk phos remains at 220 was previously 220 and prior 180.  AST down to 21 and previously 38.  ALT down to 35 from previously 65.  May 3 labs show glucose slightly up at 108 and may be not fasting that day?  BUN of 12 creatinine elevated at 1.13 previously 1.06.  Sodium 139 potassium 4.5 chloride 101 CO2 21.  The chloride and CO2 are back to normal which they were off last time. White blood cell count down to 10 hemoglobin 14.8 platelet count 430 MCV 90 neutrophils normal at 6.5 lymphocytes normal now at 2.5. Albumin 4.7 also down from 5.0.  Bilirubin normal now at 1.0 alkaline phosphatase still elevated though at 220 previously was 180.  AST was 38 and ALT 65.  Previously AST 27 and ALT 44. Called pt and no new meds. Did drop citalopram to 10mg po qd and she is now off. Soumya 300mg po tid for 2 weeks or so. Asked if gaining weight and she is not. Smoking a lot of cigarettes and I have seen that before but not common to see. She will work on this and see how she does. She will try to work on that. Redo the labs in 2 weeks.  She stopped her lisinopril and changed to amlodipine and did not start as bp is low. 95/71.  January 19, 2022 ultrasound sent in. Liver appeared to be increased abd coarsened in regards to echogenicity.  No definite liver lesions.  No dilated ducts. Normal hepatopetal flow seen in the main portal vein.  Imaged hepatic veins also were patent. Common bile duct normal at 5 mm. Gallbladder absent. Right kidney 10.6 cm with no hydronephrosis. Left kidney 10.5 cm with no hydronephrosis.  Spleen normal at 8.7 cm. They suspected that the liver could have moderate diffuse fatty infiltration.  I would state that this was also at the time that you are having a medication effect so it could be that threw that reading off.     April 4, 2022 labs show glucose 93 which is normal BUN of 9 creatinine slightly up at 1.05 and was previously 1.01. May want to look at your hydrational state. Sodium 139 potassium 4.7 chloride 99 CO2 of 22. Albumin 4.8 bilirubin 1.2 which is normal and direct bilirubin 0.28 suggesting that you have Gilbert's syndrome or that indirect hyperbilirubinemia that is benign and present about 10% of people. Alkaline phosphatase down to 187 from 202 and down previously from 230.  AST of 24 down from 36 and prior 49. ALT 33 down from 41 and ALT 76.  She had a prior bump in lfts from working on epox[employer].  Making cups.  She also asked re asa and crestor and  she has not started the aspirin yet and she was to start to crestor and she will start post the next labs.  March 21 labs show glucose still slightly up at 104 previously 106.  BUN of 7 and creatinine down to 1.01 from 1.19 so that was better.  Sodium 136 potassium 4.2 chloride 101 CO2 slightly low at 18. Total bilirubin normal at 1.1 previously 1.2 direct bilirubin 0.29 and I would suggest that you have Gilbert's.  Gilbert's as you recall is that indirect hyperbilirubinemia that is very common and present about 10% of people. The alkaline phosphatase is down to 202 from 230 and the AST is down to 36 from 49 and ALT 41 from 76.  It would appear that you change something? White blood cell count 9.7 hemoglobin 14.1 platelet count 444 down from 499. Important to note the white cell count also came down from 13.4 to 9.7.  Neutrophils 6.8 and lymphocytes 2.1 both normal. Called pt re the labs. She says did not change meds. 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   March 9 labs show white blood cell count 13.4 up from 11.7 and prior 10.7.  Hemoglobin 14.6.  Platelet count up a little at 499 from 445.  It has been variable before though.  MCV normal at 90.  Neutrophils up at 9.5 and lymphocytes 2.5 down from last time. Glucose 106 slightly up and BUN of 9 creatinine slightly higher at 1.9 and previously 1.15.   Sodium 137 potassium 4.1 chloride 100 CO2 slightly low at 19.  Albumin 4.0.  Total bilirubin 1.2 normal.  Direct bilirubin 0.35.  Alkaline phosphatase up to 233.  AST 49 and ALT 76 and previously AST 29 ALT 40. Called pt as labs trending up. Meds: propanol, furosemide, spirinolactone, basagalar, Spiriva and Symbicort, Geodon and lamotrigine. Ursodiol. Lamotrigine 1% risk for incl fts and on for a year. Geodon: category c for liver injury and occurs within 1-4 weeks and she has been on it for 6-8 weeks. She will discuss with psych re this.   March 3 labs show liver alkaline phosphatase fraction is 76% bone fraction is 24% and intestinal 0%  so clearly the alkaline phosphatase is coming mainly from liver.  This is the normal pattern that one would expect. March 3 labs also show albumin slightly up at 4.9 from 4.7.  Could reflect hydrational status. Total bilirubin normal at 1.2 and direct bilirubin 0.32 suggesting a Gilbert's syndrome which is a benign finding. Alkaline phosphatase remains elevated at 203 and previously was 175.  AST 29 and ALT 40 and previously AST 20 and ALT 34. Unfortunately these labs arrived very late so we need to go over with you what medicines you are on and since when.  They are not dramatically worse but they are definitely drifting up.  February 24 labs show white blood cell count elevated at 11.7 previously was 10.7.  Hemoglobin 14.5 normal platelet count 445 down from 468.  MCV 92.  Neutrophils elevated at 7.1 and lymphocytes 3.4.  Have you been ill?  Glucose 107 BUN of 12 creatinine 1.15 down from 1.26.  Normal is from 0.57 up to 1.0 so still elevated.  Sodium 137 k 3.8 chloride 98 CO2 of 22.  Albumin 4.7 normal now.  Limit 0.8 direct bilirubin 0.18 and alkaline phosphatase elevated at 175.  Previously 107.  AST 20 and ALT 34.  Previously AST 26 and ALT 30.  Need to watch this evolving alkaline phosphatase trend. Called pt: she had surgery on the right elbow and so this could be from bone and had moved ulnar nerve. She also is on some prednisone and she is on prednisone 2.5mg po qd. We need to fractionate the alkaline phosphatase. We can redo the labs next week to check on that.  February 2 labs show white blood cell count 10.7 hemoglobin 14.4 platelet count up at 468.  MCV was 90 which is normal.  Neutrophils 6.3 and lymphocytes elevated at 3.3. Glucose slightly up at 102 BUN of 13 creatinine elevated at 1.26 sodium 139 potassium 3.9 chloride 99 CO2 of 22 albumin elevated at 4.9 bilirubin 0.8 direct bilirubin 0.21 alkaline phosphatase 107 AST 26 and ALT 30 so clearly that must have been that herbal smooth product that you were on and the other herbals and we can reassess from there. Called pt: she had ketorolac also. Start the ursodiol 300mg po bid with food. She will do that and follow labs.  Started atorvastatin since dec 2020 and she is still on that for now and then to do the crestor,  January 4 local labs sent in. Glucose 159 elevated please share with primary provider.  He had a 16 creatinine 0.96 sodium 138 potassium 4.2 chloride 99 calcium 9.9 total bili 0.4 alkaline phosphatase 202 and . Prior December 29 labs showed alk phos 114 AST 28 and ALT 45 so clearly those numbers bumped. White blood cell count 8.4 hemoglobin 13.2 platelet count 350. Cholesterol 226 elevated.  Triglyceride 284 elevated.   elevated.  Hemoglobin A1c slightly better at 7.9 from 8.1%.  TSH 1.78.  Magnesium 2.2. Called pt: need new labs to see what labs doing.  Dec 29 labs: ast 48 and alt 31 and alk 111 and tb 0.4.  December 13 2021  labs show white blood  cell count elevated 11.9 but down from 12.6.  Hemoglobin 15 and platelets up at 503 and previously 394.  That can sometimes go up with inflammation or medicines.  Please be sure to share this info with primary providers. MCV normal at 90.  Neutrophils elevated 7.4 but down from 10.1 back on December 2.  Lymphocytes normal at 3.1 and monocytes slightly up at 1.0. Glucose elevated at 149 and was previously 114.  Hemoglobin A1c up to 8.1 and was previously 6.3.  You should look at this sugar issue again with your primary provider as that is unusual for you. BUN of 12 creatinine 1.19 which is up from 1.014 sodium 136 potassium 4.2 chloride 98 CO2 20. Calcium at 10.4 and was previously normal at 9.6 and I wonder if it is medicine related? Albumin 4.6 bilirubin 0.8 alkaline phosphatase slightly higher at 130 previously 126. AST down to 32 and ALT down to 49 previously AST 65 and ALT 66. C-reactive protein noted to be normal at 2.  Dec 1 labs much better: Tp 6.9 and alb 4.2 and tb 0.2 and db 0.10 and alk 111 and ast 19 and alt 43 and down from prior alk 301 and ast 77 and alt 171. So doing much better. Keep track of any new meds added or removed.  November 98 labs show 123 which is elevated BUN of 18 creatinine 0.99 sodium 137 potassium 5.1 chloride 99 CO2 of 24 albumin 4.7 bilirubin 0.5 alkaline phosphatase down to 118 from 194.  AST 29 and previously 21 ALT down to 46 from 53.  So clearly centimeters drop. She says she had increased the Seroquel and then being weaned on it now. She is now on 50 mg of Seroquel and she is to stop it tonight and she will redo labs in 1-2 weeks to be sure.  Ca 10.3 little up and she is not on supplements for this and follow course. She will let primary md know.  Oct 12: alb 4.5 and tb 0.6 and db 0.19 and alk 301 and ast 77 and alt 171 and prior ast 33 and alt 52. went to 25 and 50 and now 100mg.  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 Says taking dilaudid and ibuprofen for that. She says taking a dilaudid occ and one ibuprofen. 10-1 labs also off. Still on steroids 10mg po qd for the rheum arthritis so enbrel stopped working and went to this. She did myasthenia screen and neg and started on this and trying to do humira sunday.   10-1 alk 199 and ast 56 and alt 149.   September 21 ultrasound showed the liver to be 15.6 cm with mild hepatic steatosis but no focal mass. Liver vessels are noted to be patent. Prior cholecystectomy changes seen. No bile duct dilation seen with common bile duct 5 mm. Visualized pancreas portions were unremarkable proportions of the head and tail the pancreas were not seen due to gas. Right kidney 10.6 cm and left kidney 10.8 cm with no hydronephrosis. Spleen normal at 8.5 cm. Mild liver fat overall was seen patent vessels were seen. No ascites was seen.  September 21 labs show albumin 4.9 which remains slightly up bilirubin 0.4 which is down from 0.8 direct bilirubin 0.13 which is down from 0.23. Alkaline phosphatase down to 198 from 207 AST slightly up at 33 from 28 ALT 52 down from 66.  September 9 redo labs show albumin stable 4.9 bilirubin 0.8 direct 0.23 alkaline phosphatase slightly lower at 207 from 215. AST down to 28 from 61. ALT down to 66 from 86.  So it does appear the labs are dropping with lamictal drop 150 to 100mg and then went up on seroquel. Sept 7 labs: alb 4.8 and tb 0.7 and db 0.2 and alk 215 and Ast 61 and alt 86.  Prior aug 27 albs ast 18 and alt 28 and alk 135. that was pre lamictal changed.  She had prior drinking green tea for a few weeks. She has an immune issue and so the labs spiked up. then stopped and labs settled last year off that.  Pt was to do a bx and trouble breathing and Dr Self told to do barium swallow and manometry and did the barium and to do the manometry and says told was ok.  Ent saw some plaques and she had bx done.  Pelvic area pain noted and se started with pain and went to er and went [facility] to [facility] and she said cyst in fallopian tube and ruptured and did a full hysterectomy and that was July 28 2021.  Ex  burned her citizenship certificate. She said she applied to get it. 555.00 for that.  Enbrel started earlier for seronegative RA.  She says needed onc re wbc up and asked him to do the lfts and she says part of labs back and she has from aug 8 2021. Ast and alt done and alk 209 and prior 138 and ast 149 and alt prior 34 and alt 231 and 76 july 19. b12 1804 and IGA 68 little low.  May 3:wbc 9.6 hg 13.7 and plat 355 and mcv 93. Neutrophils 6.5. glu 92 and cr 0.95 and na 137 and k 4.3 and ca 9.8 and alb 4.5 and tb 0.3 and alk 177 elevated and ast 30 and alt 70 and tesosterone level 6 normal per chart for your age. Prior alk 277 and ast 126 and alt 315. So came down post that doxycycline exposure.  April 6, 2021 u.s at [facility]: pancreas appears normal where seen and liver demonstrates normal size and contour and echogenicity. Liver vessels patent. Gallbladder absent. Common bile duct 4mm. No hydronephrosis seen to kidneys. Spleen 8.4cm. No liver mass seen.  Jan 11: glu 171 elevated and show local md. cr 1.06 and na 137 and k 4.0 and cl 98 and co2 21 and ca 9.7 normal and alb 4.6 and tb 0.7 and alk 88 and ast 13 and alt 16. So liver labs doing very well. A1c 5.9% noted.  Jan 20 2021: ct no cardiomegaly. Clear lung bases. Unremarkable liver. Prior gb surgery changes. Unremarkable spleen. Pancreas and and adrenal glands normal. cyst right kidney. left renal cyst. Mild rectosigmoid wall thickening. Liver vessels patent. No bulk adenopath.  Nov 9 2020 labs: wbc 13. 4hg 14 and plat 453 elevated slightly (150- 450 normal). na 135 k 4.6 and cl 97 and co2 26 and calcium elevated 11.0.  Show to local md: why is calcium up?alb 4.5 and tb 0.6 and alk 165 elevated and ast 22 and alt 36 so back down. chol 336 elevated and trg 307 and ldl 219. Lipids off and not sure if due to recent liver flare or other and may want to redo that as known if liver flared that this can be off. Vit d low 28.8. a1c 5.9%.  She says has had low vit d and she was to start this and she was started on 5000 a day for 4 m and she had been taking some vit d. She says she had stopped it for surgery July and restarted it again.  She says was on doxycycline and said had a prior hx of tcn reaction and filled and she said she may have been more ill from it. No reexposure.  Nov 3 2020 : ast 50 and alt 121 (both down then from 121 and 222). main she is off green tea and the lamictal also off prior. Suspect green tea was immune stimulant reving up your immune system. She has a very active immune system.  Rheum says she has fibromyalgia and doing water therapy.  11-2-20: ast down to 50 and alt 118 and prior 121 and alt 222 . alk 213 still up from 210 and tb down to 0.4 from 0.5.  Oct 26 labs tb 0.5 and alk 210 and ast 121 and alt 222. alk 248 and so now 210 so that is better, and ast 128 and that is now 121 and alt was 248 and now 222 so better and we need to follow.  Oct 19 labs spiked again: alk 198 and ast 128 and alt 248. Tb 0.5. na 136 and k k 4.5. bun 15 and cr 0.94 and glu 112.   Oct 6 ast 16 and alt 25 and alk 111. Prior she was also on abx for 6 weeks and so finished oct 8 or so and so that not the cause.  Oct 6: wbc 11.2 little up, hg 14.4 and hct 42.2, plat 141. Mcv 90, glu 103 little up and maybe not fasting. Bun 20 and cr 0.98 and na 135 and k 4.5, cl 99 and co2 22. Alb 4.2 and tb 0.5 and alk 111 and ast 16 and alt 25.  Prior visit lamictal came down from 75/50 to 50/25 as of oct 9 and added gabapentin 400mg and seroquel 25mg po qhs an atorvastatin 40mg po qhs.  Off the prior nasal exposures also.  Aunt Ewelina passed in oct 2020 had oltx and she had failed from 2nd tx. Not from covid 19.  U.s aug 2020 [facility]: they saw mild fatty infiltration of the liver. Prior gb surgery changes seen. Visualized pancreas portions normal. No liver masses. Common duct 4mm. Portal vein patent. Kidneys unremarkable. Spleen 7.8cm unremarkable. Should get better as time passes from the issues we discussed.  Aug 4 2020 labs: glu 61 and little low bun 17 and cr 0.91 and na 135 and k 5.2 and cl 96 and co2 20 and ca 10.9 elevated and show local md. alb 4.7 and tb 0.6 and alk 104 and ast 24 and alt 28.  She was on ursodiol for has been on it for her liver started july 29 and she did stop it . She is staying off this as last time ok and may need it.  Nov 25 2020: wbc 11.3 and hg 14.1 plat 484 elevated and neutrophils 7.7 and glu 144 elevated and cr 1.08 little up.na 139 and k 4.3 and cl 99 and co2 24 and ca 10 and alb 4,7 and tb 0.3 and alk 130 and ast 15 and alt 18. Addendum: 1. Smoothie immune booster did. 2. Michelle took that. 3. She will confirm medicine.  Last time had been down 120 and now 110 in aug.   Plan: 1. We need labs and July for imaging. 2. Pt staying abstinent. 3. Pt has new insurance.   Duration of visit was 30 min with 10 min of prep and loading to ecw and then 20 min by healow video with time spent reviewing chart and events with the patient and in discussing plans.

## 2024-05-04 ENCOUNTER — TELEPHONE ENCOUNTER (OUTPATIENT)
Dept: URBAN - METROPOLITAN AREA CLINIC 86 | Facility: CLINIC | Age: 49
End: 2024-05-04

## 2024-05-04 ENCOUNTER — WEB ENCOUNTER (OUTPATIENT)
Dept: URBAN - METROPOLITAN AREA CLINIC 86 | Facility: CLINIC | Age: 49
End: 2024-05-04

## 2024-05-13 ENCOUNTER — LAB OUTSIDE AN ENCOUNTER (OUTPATIENT)
Dept: URBAN - METROPOLITAN AREA CLINIC 86 | Facility: CLINIC | Age: 49
End: 2024-05-13

## 2024-05-16 ENCOUNTER — OFFICE VISIT (OUTPATIENT)
Dept: URBAN - METROPOLITAN AREA TELEHEALTH 2 | Facility: TELEHEALTH | Age: 49
End: 2024-05-16

## 2024-05-16 RX ORDER — ATORVASTATIN CALCIUM 20 MG/1
1 TABLET TABLET, FILM COATED ORAL ONCE A DAY
Status: ACTIVE | COMMUNITY

## 2024-05-16 RX ORDER — ZIPRASIDONE HYDROCHLORIDE 60 MG/1
1 CAPSULE WITH FOOD CAPSULE ORAL
Status: ACTIVE | COMMUNITY

## 2024-05-16 RX ORDER — DIAZEPAM 5 MG/1
1 TABLET AS NEEDED TABLET ORAL ONCE A DAY
Status: ACTIVE | COMMUNITY

## 2024-05-16 RX ORDER — VARENICLINE 0.03 MG/.05ML
1 SPRAY IN EACH NOSTRIL SPRAY NASAL TWICE A DAY
Status: ACTIVE | COMMUNITY

## 2024-05-16 RX ORDER — ZIPRASIDONE HYDROCHLORIDE 60 MG/1
1 CAPSULE WITH FOOD CAPSULE ORAL ONCE A DAY
Status: ACTIVE | COMMUNITY

## 2024-05-16 RX ORDER — VORTIOXETINE 20 MG/1
1 TABLET TABLET, FILM COATED ORAL ONCE A DAY
Status: ACTIVE | COMMUNITY

## 2024-05-16 RX ORDER — LISDEXAMFETAMINE DIMESYLATE 60 MG/1
1 TABLET IN THE MORNING TABLET, CHEWABLE ORAL ONCE A DAY
Status: ACTIVE | COMMUNITY

## 2024-05-16 RX ORDER — BUDESONIDE, GLYCOPYRROLATE, AND FORMOTEROL FUMARATE 160; 9; 4.8 UG/1; UG/1; UG/1
2 PUFFS AEROSOL, METERED RESPIRATORY (INHALATION) TWICE A DAY
Status: ACTIVE | COMMUNITY

## 2024-05-16 RX ORDER — URSODIOL 300 MG/1
1 CAPSULE CAPSULE ORAL
Qty: 60 | Refills: 1 | Status: ACTIVE | COMMUNITY

## 2024-05-23 ENCOUNTER — TELEPHONE ENCOUNTER (OUTPATIENT)
Dept: URBAN - METROPOLITAN AREA CLINIC 79 | Facility: CLINIC | Age: 49
End: 2024-05-23

## 2024-05-25 ENCOUNTER — TELEPHONE ENCOUNTER (OUTPATIENT)
Dept: URBAN - METROPOLITAN AREA CLINIC 86 | Facility: CLINIC | Age: 49
End: 2024-05-25

## 2024-05-25 LAB
ALBUMIN: 4.3
ALKALINE PHOSPHATASE: 202
ALT (SGPT): 60
AST (SGOT): 35
BILIRUBIN, DIRECT: 0.23
BILIRUBIN, TOTAL: 0.8
PROTEIN, TOTAL: 7

## 2024-05-27 ENCOUNTER — LAB OUTSIDE AN ENCOUNTER (OUTPATIENT)
Dept: URBAN - METROPOLITAN AREA CLINIC 86 | Facility: CLINIC | Age: 49
End: 2024-05-27

## 2024-06-10 ENCOUNTER — LAB OUTSIDE AN ENCOUNTER (OUTPATIENT)
Dept: URBAN - METROPOLITAN AREA CLINIC 86 | Facility: CLINIC | Age: 49
End: 2024-06-10

## 2024-06-16 ENCOUNTER — WEB ENCOUNTER (OUTPATIENT)
Dept: URBAN - METROPOLITAN AREA CLINIC 86 | Facility: CLINIC | Age: 49
End: 2024-06-16

## 2024-06-26 ENCOUNTER — WEB ENCOUNTER (OUTPATIENT)
Dept: URBAN - METROPOLITAN AREA CLINIC 86 | Facility: CLINIC | Age: 49
End: 2024-06-26

## 2024-06-28 ENCOUNTER — TELEPHONE ENCOUNTER (OUTPATIENT)
Dept: URBAN - METROPOLITAN AREA CLINIC 86 | Facility: CLINIC | Age: 49
End: 2024-06-28

## 2024-06-28 NOTE — HPI-TODAY'S VISIT:
Dear Chanda Sarmiento, June 25 labs show total protein 7.5, albumin 4.7, bilirubin 0.8 and direct bilirubin 0.2 and these are all normal.  Your alk phos is coming down to 184 from 202 AST about the same at 37 from 25 and ALT went up to 70 from 60.    Need to go over your current list of meds because something is clearly keeping your labs up and I need to know when each of  the medicines that you are currently were started so we can look at the sequence of meds together and figure out what may be triggering in keeping these labs.   Whenever your labs are staying up it is always the medicine just a matter of figuring out which one did it.     Dr. Issa

## 2024-07-01 ENCOUNTER — LAB OUTSIDE AN ENCOUNTER (OUTPATIENT)
Dept: URBAN - METROPOLITAN AREA TELEHEALTH 2 | Facility: TELEHEALTH | Age: 49
End: 2024-07-01

## 2024-07-02 ENCOUNTER — TELEPHONE ENCOUNTER (OUTPATIENT)
Dept: URBAN - METROPOLITAN AREA CLINIC 91 | Facility: CLINIC | Age: 49
End: 2024-07-02

## 2024-07-02 RX ORDER — URSODIOL 300 MG/1
1 CAPSULE CAPSULE ORAL
Qty: 180 | Refills: 1

## 2024-07-03 ENCOUNTER — TELEPHONE ENCOUNTER (OUTPATIENT)
Dept: URBAN - METROPOLITAN AREA CLINIC 86 | Facility: CLINIC | Age: 49
End: 2024-07-03

## 2024-07-05 ENCOUNTER — OFFICE VISIT (OUTPATIENT)
Dept: URBAN - METROPOLITAN AREA CLINIC 22 | Facility: CLINIC | Age: 49
End: 2024-07-05

## 2024-07-08 ENCOUNTER — LAB OUTSIDE AN ENCOUNTER (OUTPATIENT)
Dept: URBAN - METROPOLITAN AREA CLINIC 91 | Facility: CLINIC | Age: 49
End: 2024-07-08

## 2024-07-08 ENCOUNTER — LAB OUTSIDE AN ENCOUNTER (OUTPATIENT)
Dept: URBAN - METROPOLITAN AREA CLINIC 86 | Facility: CLINIC | Age: 49
End: 2024-07-08

## 2024-07-08 ENCOUNTER — TELEPHONE ENCOUNTER (OUTPATIENT)
Dept: URBAN - METROPOLITAN AREA CLINIC 91 | Facility: CLINIC | Age: 49
End: 2024-07-08

## 2024-07-16 ENCOUNTER — OFFICE VISIT (OUTPATIENT)
Dept: URBAN - METROPOLITAN AREA TELEHEALTH 2 | Facility: TELEHEALTH | Age: 49
End: 2024-07-16
Payer: COMMERCIAL

## 2024-07-16 ENCOUNTER — DASHBOARD ENCOUNTERS (OUTPATIENT)
Age: 49
End: 2024-07-16

## 2024-07-16 ENCOUNTER — LAB OUTSIDE AN ENCOUNTER (OUTPATIENT)
Dept: URBAN - METROPOLITAN AREA TELEHEALTH 2 | Facility: TELEHEALTH | Age: 49
End: 2024-07-16

## 2024-07-16 VITALS — BODY MASS INDEX: 20.43 KG/M2 | WEIGHT: 111 LBS | HEIGHT: 62 IN

## 2024-07-16 DIAGNOSIS — F10.21 ALCOHOL DEPENDENCE IN REMISSION: ICD-10-CM

## 2024-07-16 DIAGNOSIS — Z79.899 HIGH RISK MEDICATION USE: ICD-10-CM

## 2024-07-16 DIAGNOSIS — R74.8 ABNORMAL LIVER ENZYMES: ICD-10-CM

## 2024-07-16 DIAGNOSIS — K75.4 AUTOIMMUNE HEPATITIS: ICD-10-CM

## 2024-07-16 PROCEDURE — 99214 OFFICE O/P EST MOD 30 MIN: CPT

## 2024-07-16 RX ORDER — URSODIOL 300 MG/1
1 CAPSULE CAPSULE ORAL
Qty: 180 | Refills: 1

## 2024-07-16 RX ORDER — BUDESONIDE, GLYCOPYRROLATE, AND FORMOTEROL FUMARATE 160; 9; 4.8 UG/1; UG/1; UG/1
2 PUFFS AEROSOL, METERED RESPIRATORY (INHALATION) TWICE A DAY
Status: ACTIVE | COMMUNITY

## 2024-07-16 RX ORDER — VORTIOXETINE 20 MG/1
1 TABLET TABLET, FILM COATED ORAL ONCE A DAY
Status: ACTIVE | COMMUNITY

## 2024-07-16 RX ORDER — ATORVASTATIN CALCIUM 20 MG/1
1 TABLET TABLET, FILM COATED ORAL ONCE A DAY
Status: ACTIVE | COMMUNITY

## 2024-07-16 RX ORDER — ZIPRASIDONE HYDROCHLORIDE 60 MG/1
1 CAPSULE WITH FOOD CAPSULE ORAL
Status: ACTIVE | COMMUNITY

## 2024-07-16 RX ORDER — VARENICLINE 0.03 MG/.05ML
1 SPRAY IN EACH NOSTRIL SPRAY NASAL TWICE A DAY
Status: ACTIVE | COMMUNITY

## 2024-07-16 RX ORDER — LISDEXAMFETAMINE DIMESYLATE 60 MG/1
1 TABLET IN THE MORNING TABLET, CHEWABLE ORAL ONCE A DAY
Status: ACTIVE | COMMUNITY

## 2024-07-16 RX ORDER — DIAZEPAM 5 MG/1
1 TABLET AS NEEDED TABLET ORAL ONCE A DAY
Status: ACTIVE | COMMUNITY

## 2024-07-16 RX ORDER — URSODIOL 300 MG/1
1 CAPSULE CAPSULE ORAL
Qty: 180 | Refills: 1 | Status: ACTIVE | COMMUNITY

## 2024-07-16 NOTE — HPI-TODAY'S VISIT:
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  She is working for [employer].  June 25 labs show total protein 7.5, albumin 4.7, bilirubin 0.8 and direct bilirubin 0.2 and these are all normal. Your alk phos is coming down to 184 from 202 AST about the same at 37 from 25 and ALT went up to 70 from 60.  She says she had the doxepin that was removed 2-3 m and switched to valium and after the valium took temazepam and just stopped and back to valium.  May 23 labs show total protein 7.0 and alb 4.3 and tb 0.8 and direct 0.23 and alk 202 and prior 150 on their listed comparison but see below for more recent one. Ast 35 and alt 60.  Earlier May 2024 labs alk 212 and ast 48 and alt 68 and so  ast and alt seem to be lower now and also see a minimal drop seen in alk phos to 202.  May 2: Glucose 109 and elevated and not clear if fasting. Bun 14 and cr 0.77 and na 137 and k 3.9 and cl 103 and co2 21 and ca 9.8 and alb 4.2 and Tb 0.8 and alk 212 and prior 181 and so higher. Ast 48 and alt 68 also up and prior ast 18 and alt 28 so suggests a new medicine cause. Wbc 9.4 and hg 14.4 and hct 44 and mcv 95 and platelets 366. Neutrophils 5.6 and lymphs 2.6.   January 5, 2024 ultrasound finally was routed to your chart. Part of the problem was the name change (they has [place] listed) and  I think that threw them off even though the date of birth is the same. Pancreas was unremarkable. Liver was homogeneous/even in echotexture and with no discrete lesions. Liver vessels were patent as expected. Common bile duct was normal at 3 mm. Right kidney was 10.2 cm with no hydronephrosis. Spleen normal at 7.5 cm. Splenic vessels patent.  January 2 labs show WBC normal at 8.8 hemoglobin 15.2 platelet count 376 MCV 94. Neutrophils and lymphocytes both normal. Glucose slightly low at 66 with normal for that lab being 70-99. BUN of 10 creatinine 0.78 sodium 143 potassium 4.3 calcium 10.2 albumin 4.6 bilirubin 0.5 and only your alkaline phosphatase was elevated at 181. AST was normal at 18 and ALT was normal at 28. Prior labs that you did in September 18 had shown your alk 172  AST 23 and ALT 35 so these appear to be lower now than then. Unclear why you are alk phos is isolated up and we may want to talk about fractionating your alkaline phosphatase. It is still possible that a medication could be causing that to be off as well so lets go over your medicine list at the upcoming visit and review these options with you.  Trintellix started in Dec and remains on it.  Oct 20 2023 glu 129 and bun 9 and cr 0.9 and na 140 and k 5.0 and cl 101 and co2 23 and ca 10.3 and alb 4.6 and tb 0.9 and alk 149 and ast 23 and alt 39.   September 18 sent in. Glucose was slightly up at 102 BUN of 14 creatinine 0.86 sodium 140 potassium 4.7 calcium 10.2 albumin 4.4 bilirubin 0.4 alk phos elevated at 172 and back in September 11 of been 167. Your AST was higher at 23 and ALT higher at 35 and previous in September 11 your AST was 13 and ALT of 17. Sed rate was noted to be 2 and C-reactive protein was 1 and PTH was 33. September 11 labs show WBC 7.7 hemoglobin elevated 16.7 plate count 423 MCV 93 with a BUN of 9 creatinine 0.96 sodium 142 potassium 5.2 elevated calcium of 10.6. AST then again 13 and ALT of 17 alk phos 167. Cholesterol was up at 270 and triglycerides were up at 378 and HDL 48 and  so those were definitely off. INR normal at 1.0. B12 normal at 637 and folate normal at 9.9. Hemoglobin A1c slightly up at 5.8 so you need to watch that as you may be moving into prediabetes. TSH 1.67. Uric acid was 3.2. For comparison back in February your AST was 25 and ALT 29 alk phos 161. Very important to see what are you doing differently between September 11 and the September 18 labs.  Full labs from July 22 sent in. You read me the essential ones but the full labs showed glucose 72 BUN of 8 creatinine 0.64 sodium 137 potassium 4.2 chloride 106 CO2 of 22 BUN 4.4 bilirubin 0.7 alk phos elevated at 172 AST 24 and ALT 20. Back in May your alk phos of been 157 so that appears to be a little higher. TSH 0.76. WBC 7.1, hemoglobin up at 16.6 hematocrit up at 50.8 plate count 436 elevated MCV 99. Neutrophils 3479 lymphocytes 2918. Hep B core total negative. RPR negative. HIV test negative. Hep B surface antigen negative. Hep C antibody negative. July 22 drug screen had been positive for alcohol metabolites, amphetamine, and marijuana.  She is 9 m without any alcohol.  August 30 ultrasound shows liver echogenicity to be normal with no focal lesions. Common bile duct was normal at 3 mm. Prior cholecystectomy changes were seen. No ascites was seen. Visualized pancreas portions appeared unremarkable. Liver vessels were patent. Spleen was normal at 7.4 cm. Right kidney 10.2 cm with no hydronephrosis. In summary you have a normal-appearing liver and patent vessels.  Prior cholecystectomy changes were seen.  You mentioned she justy left rehab x 28 days and she says sent to [facility].  July 22: last ones: ast 24 and alt 20 and alk 172 up and hg 16.6 cr 0.64. She [facility] to [facility].  She has been off the alcohol now 41 day.  Labs may be better now as further than the last labs.  Pt last visit had beenm having some alcohol but she has cut back some and she is following with therapy and having 8 ounces and day and if have more than 2-3 drinks a day at risk for cirrhosis,  May 11 2023 glu 70 and and bun 11 and cr 0.68 and na 136 and k 4 and cl 99 and co2 26 and ca 10.3 and alb 4.6 and tb 1.1 and alk 157 and ast 34 and alt 60 and mg 2.1 and tsh 1.10 and wbc 9.3 and hg 15.9 and hct 45.5 and platelet 362, mcv 93.4  neutrophils 6371 and lymphs 2018.b432 and folate 14 vit d 78 and ethyl alcohol and over [id number].  Re meds: none in rehab and stratera 60mg qd and 40mg escitalopram and geodon 60mg po qd.  February 6 showed that the pancreas was unremarkable, the liver was homogeneous/even in echotexture with no discrete lesions. Liver vessels were patent. Common bile duct was normal at 2 mm.  Right kidney was 10.8 cm with no hydronephrosis. Spleen was normal at 7.9 cm with splenic vessels patent.  Pt says she was drinking more in 2020 and not until this past year. 6m of this,  Smoking 1/2 ppd and need to work on this.  She says mother is doing study with [place]. Doing a clinical study for her lungs and for chronic bronchitis.  She says she had labs done last week. Rheum saw.  2-7-2023 and wbc 8.6 hg 15 and plat 387 and neutrophils 4.9 and lymph 2.7 and gly 89 and bun 11 and cr 0.85 and na 140 and k 4.2 and cl 99 and co2 24 and alb 4.7 and tb 0.9 and alk 161 and ast 25 and alt 29.  Alcohol could not sense with the vyvance.  She was changed from adderall to vyvanse and maybe for 3m. She is on ursodiol 300mg po tid.  9/14/22 labs were sent to us. White blood cell count 10.8, hemoglobin 13.2, MCV 95, platelets 337, lymphocytes 2.3, neutrophils 7.3 which is slightly elevated please make sure your primary care doctor is aware. Glucose 98, creatinine 0.65, sodium 142, potassium 4.1, bilirubin 0.5, alkaline phosphatase 146, AST 17, ALT 35. The alkaline phosphatase and the AST are trending in the right direction. Slight increase in the alt, previousl it was 31. We will continue to monitor this. Also of note the protein was slightly low at 5.9 , please share with your primary care.  Aug 5: glu 93 and bun 6 and cr 0.92 and na 141 and k 4.3 and cl 102 and co2 23. Wbc 10.4 and hg 14.6 and hct 44.3 and plat 404.  Mcv 95. Neutrophils 7.3 little up and prior normal 6.8. Lymphs 2.1. Alb 4.4 and tb 0.8 and db 0.22 and alk 150 ast 27 and alt 23.  Prior June 27: alk 183 and ast 18 and alt 31. So labs trending right way with some variation in ast and alt but still in normal range for ast and alt. No clear acute issues.  We will contact [facility] for the u.s done feb 3.  Aug 5  ultrasound shows liver normal in contour and echogenicity.   No suspicious liver lesions. Common bile duct was normal at 2.4 mm and gallbladder absent. Right kidney 9.6 cm with no hydronephrosis. Spleen normal at 8.6 cm. Imaged pancreas portions unremarkable with tail partially not well seen due to gas. Liver vessels patent. Overall good to see that there was no focal lesions and no suspicion for any fibrotic morphology.  She started to drink again and she is having like tequila and has 1 ounce and doing 6 ounces on a rare day and 3 ounces a day. Started 1 month ago.  Pt says labs in Jan 2022 were up and she was ordered a medicine that had diclofenac and Geodon in Jan. She is stil on the geodon at 40mg.  June 27 labs show glucose normal at 86 BUN of 10 creatinine 0.93 down from 0.94.  Sodium 141 potassium 3.9 chloride 103 CO2 24. White blood count still up at 11 but down from 13.5 with.  Hemoglobin 13.2 which is stable.  Platelet count normal at 363 from 411.  MCV normal at 92.  Neutrophils normal at 6.8 and lymphocytes 3.0. Albumin 4.1 bilirubin 1.1 direct bilirubin 0.3.  Alk phos down to 183 from 213.  AST down to 18 and ALT down to 31 from previous AST 17 and ALT 35.  Ideal ALT is less than 25.   June 8 labs showed glucose elevated at 123 and previously was normal but has occasionally been elevated as back in May 3 it was 108.  Please share with primary provider.  Creatinine down to 0.94 from 1.15 so doing better there.  Sodium 138 potassium 4.2 chloride 103 CO2 of 22. White blood  cell count up at 13.5 from 10.3.  Were you ill recently?  Hemoglobin 13.2 platelet count down to normal at 411 from 452.  Neutrophils up at 10.8 and again wonder if he had a recent infection or vaccine?  Lymphocytes 2.1 normal. Albumin 4.5 bilirubin 0.6 which is stable direct bilirubin 0.2.  Alkaline phosphatase slightly lowered to 13 from 220 AST low at 17 from 21 previously 38.  ALT still slightly up at 35 but previously 35 and back on May 3 have been 65. Overall liver labs are dropping but slowing down on that. Any new meds? Maybe role of recent illness?  May 19 labs show glucose down to 97 from 108 before.  BUN of 10.  Creatinine still remains slightly up at 1.15 from 1.13.  He to stay hydrated with the increasing temperatures. Sodium 135 potassium 4.7 chloride 96 CO2 of 21. White blood cell count 10.3 hemoglobin 14.1 platelet count slightly up at 452 and was previously 430.  MCV 92.  Neutrophils 6.4 lymphocytes 2.7 normal. Albumin 4.6 bilirubin 0.6 which is down from 1.0 and prior 1.4.  Direct bilirubin 0.21.  Alk phos remains at 220 was previously 220 and prior 180.  AST down to 21 and previously 38.  ALT down to 35 from previously 65.  May 3 labs show glucose slightly up at 108 and may be not fasting that day?  BUN of 12 creatinine elevated at 1.13 previously 1.06.  Sodium 139 potassium 4.5 chloride 101 CO2 21.  The chloride and CO2 are back to normal which they were off last time. White blood cell count down to 10 hemoglobin 14.8 platelet count 430 MCV 90 neutrophils normal at 6.5 lymphocytes normal now at 2.5. Albumin 4.7 also down from 5.0.  Bilirubin normal now at 1.0 alkaline phosphatase still elevated though at 220 previously was 180.  AST was 38 and ALT 65.  Previously AST 27 and ALT 44. Called pt and no new meds. Did drop citalopram to 10mg po qd and she is now off. Soumya 300mg po tid for 2 weeks or so. Asked if gaining weight and she is not. Smoking a lot of cigarettes and I have seen that before but not common to see. She will work on this and see how she does. She will try to work on that. Redo the labs in 2 weeks.  She stopped her lisinopril and changed to amlodipine and did not start as bp is low. 95/71.  January 19, 2022 ultrasound sent in. Liver appeared to be increased abd coarsened in regards to echogenicity.  No definite liver lesions.  No dilated ducts. Normal hepatopetal flow seen in the main portal vein.  Imaged hepatic veins also were patent. Common bile duct normal at 5 mm. Gallbladder absent. Right kidney 10.6 cm with no hydronephrosis. Left kidney 10.5 cm with no hydronephrosis.  Spleen normal at 8.7 cm. They suspected that the liver could have moderate diffuse fatty infiltration.  I would state that this was also at the time that you are having a medication effect so it could be that threw that reading off.     April 4, 2022 labs show glucose 93 which is normal BUN of 9 creatinine slightly up at 1.05 and was previously 1.01. May want to look at your hydrational state. Sodium 139 potassium 4.7 chloride 99 CO2 of 22. Albumin 4.8 bilirubin 1.2 which is normal and direct bilirubin 0.28 suggesting that you have Gilbert's syndrome or that indirect hyperbilirubinemia that is benign and present about 10% of people. Alkaline phosphatase down to 187 from 202 and down previously from 230.  AST of 24 down from 36 and prior 49. ALT 33 down from 41 and ALT 76.  She had a prior bump in lfts from working on epoxys.  Making cups.  She also asked re asa and crestor and  she has not started the aspirin yet and she was to start to crestor and she will start post the next labs.  March 21 labs show glucose still slightly up at 104 previously 106.  BUN of 7 and creatinine down to 1.01 from 1.19 so that was better.  Sodium 136 potassium 4.2 chloride 101 CO2 slightly low at 18. Total bilirubin normal at 1.1 previously 1.2 direct bilirubin 0.29 and I would suggest that you have Gilbert's.  Gilbert's as you recall is that indirect hyperbilirubinemia that is very common and present about 10% of people. The alkaline phosphatase is down to 202 from 230 and the AST is down to 36 from 49 and ALT 41 from 76.  It would appear that you change something? White blood cell count 9.7 hemoglobin 14.1 platelet count 444 down from 499. Important to note the white cell count also came down from 13.4 to 9.7.  Neutrophils 6.8 and lymphocytes 2.1 both normal. Called pt re the labs. She says did not change meds. 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   March 9 labs show white blood cell count 13.4 up from 11.7 and prior 10.7.  Hemoglobin 14.6.  Platelet count up a little at 499 from 445.  It has been variable before though.  MCV normal at 90.  Neutrophils up at 9.5 and lymphocytes 2.5 down from last time. Glucose 106 slightly up and BUN of 9 creatinine slightly higher at 1.9 and previously 1.15.   Sodium 137 potassium 4.1 chloride 100 CO2 slightly low at 19.  Albumin 4.0.  Total bilirubin 1.2 normal.  Direct bilirubin 0.35.  Alkaline phosphatase up to 233.  AST 49 and ALT 76 and previously AST 29 ALT 40. Called pt as labs trending up. Meds: propanol, furosemide, spirinolactone, basagalar, Spiriva and Symbicort, Geodon and lamotrigine. Ursodiol. Lamotrigine 1% risk for incl fts and on for a year. Geodon: category c for liver injury and occurs within 1-4 weeks and she has been on it for 6-8 weeks. She will discuss with psych re this.   March 3 labs show liver alkaline phosphatase fraction is 76% bone fraction is 24% and intestinal 0%  so clearly the alkaline phosphatase is coming mainly from liver.  This is the normal pattern that one would expect. March 3 labs also show albumin slightly up at 4.9 from 4.7.  Could reflect hydrational status. Total bilirubin normal at 1.2 and direct bilirubin 0.32 suggesting a Gilbert's syndrome which is a benign finding. Alkaline phosphatase remains elevated at 203 and previously was 175.  AST 29 and ALT 40 and previously AST 20 and ALT 34. Unfortunately these labs arrived very late so we need to go over with you what medicines you are on and since when.  They are not dramatically worse but they are definitely drifting up.  February 24 labs show white blood cell count elevated at 11.7 previously was 10.7.  Hemoglobin 14.5 normal platelet count 445 down from 468.  MCV 92.  Neutrophils elevated at 7.1 and lymphocytes 3.4.  Have you been ill?  Glucose 107 BUN of 12 creatinine 1.15 down from 1.26.  Normal is from 0.57 up to 1.0 so still elevated.  Sodium 137 k 3.8 chloride 98 CO2 of 22.  Albumin 4.7 normal now.  Limit 0.8 direct bilirubin 0.18 and alkaline phosphatase elevated at 175.  Previously 107.  AST 20 and ALT 34.  Previously AST 26 and ALT 30.  Need to watch this evolving alkaline phosphatase trend. Called pt: she had surgery on the right elbow and so this could be from bone and had moved ulnar nerve. She also is on some prednisone and she is on prednisone 2.5mg po qd. We need to fractionate the alkaline phosphatase. We can redo the labs next week to check on that.  February 2 labs show white blood cell count 10.7 hemoglobin 14.4 platelet count up at 468.  MCV was 90 which is normal.  Neutrophils 6.3 and lymphocytes elevated at 3.3. Glucose slightly up at 102 BUN of 13 creatinine elevated at 1.26 sodium 139 potassium 3.9 chloride 99 CO2 of 22 albumin elevated at 4.9 bilirubin 0.8 direct bilirubin 0.21 alkaline phosphatase 107 AST 26 and ALT 30 so clearly that must have been that herbal smooth product that you were on and the other herbals and we can reassess from there. Called pt: she had ketorolac also. Start the ursodiol 300mg po bid with food. She will do that and follow labs.  Started atorvastatin since dec 2020 and she is still on that for now and then to do the crestor,  January 4 local labs sent in. Glucose 159 elevated please share with primary provider.  He had a 16 creatinine 0.96 sodium 138 potassium 4.2 chloride 99 calcium 9.9 total bili 0.4 alkaline phosphatase 202 and . Prior December 29 labs showed alk phos 114 AST 28 and ALT 45 so clearly those numbers bumped. White blood cell count 8.4 hemoglobin 13.2 platelet count 350. Cholesterol 226 elevated.  Triglyceride 284 elevated.   elevated.  Hemoglobin A1c slightly better at 7.9 from 8.1%.  TSH 1.78.  Magnesium 2.2. Called pt: need new labs to see what labs doing.  Dec 29 labs: ast 48 and alt 31 and alk 111 and tb 0.4.  December 13 2021  labs show white blood  cell count elevated 11.9 but down from 12.6.  Hemoglobin 15 and platelets up at 503 and previously 394.  That can sometimes go up with inflammation or medicines.  Please be sure to share this info with primary providers. MCV normal at 90.  Neutrophils elevated 7.4 but down from 10.1 back on December 2.  Lymphocytes normal at 3.1 and monocytes slightly up at 1.0. Glucose elevated at 149 and was previously 114.  Hemoglobin A1c up to 8.1 and was previously 6.3.  You should look at this sugar issue again with your primary provider as that is unusual for you. BUN of 12 creatinine 1.19 which is up from 1.014 sodium 136 potassium 4.2 chloride 98 CO2 20. Calcium at 10.4 and was previously normal at 9.6 and I wonder if it is medicine related? Albumin 4.6 bilirubin 0.8 alkaline phosphatase slightly higher at 130 previously 126. AST down to 32 and ALT down to 49 previously AST 65 and ALT 66. C-reactive protein noted to be normal at 2.  Dec 1 labs much better: Tp 6.9 and alb 4.2 and tb 0.2 and db 0.10 and alk 111 and ast 19 and alt 43 and down from prior alk 301 and ast 77 and alt 171. So doing much better. Keep track of any new meds added or removed.  November 98 labs show 123 which is elevated BUN of 18 creatinine 0.99 sodium 137 potassium 5.1 chloride 99 CO2 of 24 albumin 4.7 bilirubin 0.5 alkaline phosphatase down to 118 from 194.  AST 29 and previously 21 ALT down to 46 from 53.  So clearly centimeters drop. She says she had increased the Seroquel and then being weaned on it now. She is now on 50 mg of Seroquel and she is to stop it tonight and she will redo labs in 1-2 weeks to be sure.  Ca 10.3 little up and she is not on supplements for this and follow course. She will let primary md know.  Oct 12: alb 4.5 and tb 0.6 and db 0.19 and alk 301 and ast 77 and alt 171 and prior ast 33 and alt 52. went to 25 and 50 and now 100mg.  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 Says taking dilaudid and ibuprofen for that. She says taking a dilaudid occ and one ibuprofen. 10-1 labs also off. Still on steroids 10mg po qd for the rheum arthritis so enbrel stopped working and went to this. She did myasthenia screen and neg and started on this and trying to do humira sunday.   10-1 alk 199 and ast 56 and alt 149.   September 21 ultrasound showed the liver to be 15.6 cm with mild hepatic steatosis but no focal mass. Liver vessels are noted to be patent. Prior cholecystectomy changes seen. No bile duct dilation seen with common bile duct 5 mm. Visualized pancreas portions were unremarkable proportions of the head and tail the pancreas were not seen due to gas. Right kidney 10.6 cm and left kidney 10.8 cm with no hydronephrosis. Spleen normal at 8.5 cm. Mild liver fat overall was seen patent vessels were seen. No ascites was seen.  September 21 labs show albumin 4.9 which remains slightly up bilirubin 0.4 which is down from 0.8 direct bilirubin 0.13 which is down from 0.23. Alkaline phosphatase down to 198 from 207 AST slightly up at 33 from 28 ALT 52 down from 66.  September 9 redo labs show albumin stable 4.9 bilirubin 0.8 direct 0.23 alkaline phosphatase slightly lower at 207 from 215. AST down to 28 from 61. ALT down to 66 from 86.  So it does appear the labs are dropping with lamictal drop 150 to 100mg and then went up on seroquel. Sept 7 labs: alb 4.8 and tb 0.7 and db 0.2 and alk 215 and Ast 61 and alt 86.  Prior aug 27 albs ast 18 and alt 28 and alk 135. that was pre lamictal changed.  She had prior drinking green tea for a few weeks. She has an immune issue and so the labs spiked up. then stopped and labs settled last year off that.  Pt was to do a bx and trouble breathing and Dr Self told to do barium swallow and manometry and did the barium and to do the manometry and says told was ok.  Ent saw some plaques and she had bx done.  Pelvic area pain noted and se started with pain and went to er and went [facility] to [facility] and she said cyst in fallopian tube and ruptured and did a full hysterectomy and that was July 28 2021.  Ex  burned her citizenship certificate. She said she applied to get it. [phone] for that.  Enbrel started earlier for seronegative RA.  She says needed onc re wbc up and asked him to do the lfts and she says part of labs back and she has from aug 8 2021. Ast and alt done and alk 209 and prior 138 and ast 149 and alt prior 34 and alt 231 and 76 july 19. b12 1804 and IGA 68 little low.  May 3:wbc 9.6 hg 13.7 and plat 355 and mcv 93. Neutrophils 6.5. glu 92 and cr 0.95 and na 137 and k 4.3 and ca 9.8 and alb 4.5 and tb 0.3 and alk 177 elevated and ast 30 and alt 70 and tesosterone level 6 normal per chart for your age. Prior alk 277 and ast 126 and alt 315. So came down post that doxycycline exposure.  April 6, 2021 u.s at [facility]: pancreas appears normal where seen and liver demonstrates normal size and contour and echogenicity. Liver vessels patent. Gallbladder absent. Common bile duct 4mm. No hydronephrosis seen to kidneys. Spleen 8.4cm. No liver mass seen.  Jan 11: glu 171 elevated and show local md. cr 1.06 and na 137 and k 4.0 and cl 98 and co2 21 and ca 9.7 normal and alb 4.6 and tb 0.7 and alk 88 and ast 13 and alt 16. So liver labs doing very well. A1c 5.9% noted.  Jan 20 2021: ct no cardiomegaly. Clear lung bases. Unremarkable liver. Prior gb surgery changes. Unremarkable spleen. Pancreas and and adrenal glands normal. cyst right kidney. left renal cyst. Mild rectosigmoid wall thickening. Liver vessels patent. No bulk adenopath.  Nov 9 2020 labs: wbc 13. 4hg 14 and plat 453 elevated slightly (150- 450 normal). na 135 k 4.6 and cl 97 and co2 26 and calcium elevated 11.0.  Show to local md: why is calcium up?alb 4.5 and tb 0.6 and alk 165 elevated and ast 22 and alt 36 so back down. chol 336 elevated and trg 307 and ldl 219. Lipids off and not sure if due to recent liver flare or other and may want to redo that as known if liver flared that this can be off. Vit d low 28.8. a1c 5.9%.  She says has had low vit d and she was to start this and she was started on 5000 a day for 4 m and she had been taking some vit d. She says she had stopped it for surgery July and restarted it again.  She says was on doxycycline and said had a prior hx of tcn reaction and filled and she said she may have been more ill from it. No reexposure.  Nov 3 2020 : ast 50 and alt 121 (both down then from 121 and 222). main she is off green tea and the lamictal also off prior. Suspect green tea was immune stimulant reving up your immune system. She has a very active immune system.  Rheum says she has fibromyalgia and doing water therapy.  11-2-20: ast down to 50 and alt 118 and prior 121 and alt 222 . alk 213 still up from 210 and tb down to 0.4 from 0.5.  Oct 26 labs tb 0.5 and alk 210 and ast 121 and alt 222. alk 248 and so now 210 so that is better, and ast 128 and that is now 121 and alt was 248 and now 222 so better and we need to follow.  Oct 19 labs spiked again: alk 198 and ast 128 and alt 248. Tb 0.5. na 136 and k k 4.5. bun 15 and cr 0.94 and glu 112.   Oct 6 ast 16 and alt 25 and alk 111. Prior she was also on abx for 6 weeks and so finished oct 8 or so and so that not the cause.  Oct 6: wbc 11.2 little up, hg 14.4 and hct 42.2, plat 141. Mcv 90, glu 103 little up and maybe not fasting. Bun 20 and cr 0.98 and na 135 and k 4.5, cl 99 and co2 22. Alb 4.2 and tb 0.5 and alk 111 and ast 16 and alt 25.  Prior visit lamictal came down from 75/50 to 50/25 as of oct 9 and added gabapentin 400mg and seroquel 25mg po qhs an atorvastatin 40mg po qhs.  Off the prior nasal exposures also.  Aunt Ewelina passed in oct 2020 had oltx and she had failed from 2nd tx. Not from covid 19.  U.s aug 2020 [facility]: they saw mild fatty infiltration of the liver. Prior gb surgery changes seen. Visualized pancreas portions normal. No liver masses. Common duct 4mm. Portal vein patent. Kidneys unremarkable. Spleen 7.8cm unremarkable. Should get better as time passes from the issues we discussed.  Aug 4 2020 labs: glu 61 and little low bun 17 and cr 0.91 and na 135 and k 5.2 and cl 96 and co2 20 and ca 10.9 elevated and show local md. alb 4.7 and tb 0.6 and alk 104 and ast 24 and alt 28.  She was on ursodiol for has been on it for her liver started july 29 and she did stop it . She is staying off this as last time ok and may need it.  Nov 25 2020: wbc 11.3 and hg 14.1 plat 484 elevated and neutrophils 7.7 and glu 144 elevated and cr 1.08 little up.na 139 and k 4.3 and cl 99 and co2 24 and ca 10 and alb 4,7 and tb 0.3 and alk 130 and ast 15 and alt 18. Addendum: 1. Smoothie immune booster did. 2. Michelle took that. 3. She will confirm medicine.  Last time had been down 120 and now 110 in aug.   Plan: 1. We need labs and u.s now and follow. 2. She will keep lab course for her. 3. Pt will call if issues.   Duration of visit was 32 min with 15 min of prep and loading multiple labs done and reviewed to ecw and then 17 min by milton video with time spent reviewing chart and events with the patient and in discussing plans.

## 2024-07-22 ENCOUNTER — LAB OUTSIDE AN ENCOUNTER (OUTPATIENT)
Dept: URBAN - METROPOLITAN AREA CLINIC 86 | Facility: CLINIC | Age: 49
End: 2024-07-22

## 2024-07-27 ENCOUNTER — TELEPHONE ENCOUNTER (OUTPATIENT)
Dept: URBAN - METROPOLITAN AREA CLINIC 86 | Facility: CLINIC | Age: 49
End: 2024-07-27

## 2024-07-27 LAB
ALBUMIN: 4.3
ALKALINE PHOSPHATASE: 167
ALT (SGPT): 31
AST (SGOT): 25
BILIRUBIN, DIRECT: 0.15
BILIRUBIN, TOTAL: 0.5
PROTEIN, TOTAL: 7

## 2024-07-27 NOTE — HPI-TODAY'S VISIT:
Dear Chanda Sarmiento, July 26 total protein 70 and alb 4.3 and tb 0.5 and direct bili 0.15 and alk 167 elevated and lower from 184. Ast 25 and alt 31 and prior ast 37 and alt 70. So am very happy to see labs now settled and we need keep track of the meds and new additions or changes. Can slow down labs as settled now to a month again unless new add ons. Sent via portal. Dr Issa

## 2024-07-29 ENCOUNTER — TELEPHONE ENCOUNTER (OUTPATIENT)
Dept: URBAN - METROPOLITAN AREA MEDICAL CENTER 28 | Facility: MEDICAL CENTER | Age: 49
End: 2024-07-29

## 2024-07-31 ENCOUNTER — OFFICE VISIT (OUTPATIENT)
Dept: URBAN - METROPOLITAN AREA TELEHEALTH 2 | Facility: TELEHEALTH | Age: 49
End: 2024-07-31

## 2024-08-05 ENCOUNTER — LAB OUTSIDE AN ENCOUNTER (OUTPATIENT)
Dept: URBAN - METROPOLITAN AREA CLINIC 86 | Facility: CLINIC | Age: 49
End: 2024-08-05

## 2024-08-06 ENCOUNTER — LAB OUTSIDE AN ENCOUNTER (OUTPATIENT)
Dept: URBAN - METROPOLITAN AREA TELEHEALTH 2 | Facility: TELEHEALTH | Age: 49
End: 2024-08-06

## 2024-08-20 ENCOUNTER — OFFICE VISIT (OUTPATIENT)
Dept: URBAN - METROPOLITAN AREA CLINIC 91 | Facility: CLINIC | Age: 49
End: 2024-08-20
Payer: COMMERCIAL

## 2024-08-20 DIAGNOSIS — R74.8 ABNORMAL LIVER ENZYMES: ICD-10-CM

## 2024-08-20 DIAGNOSIS — K75.4 AUTOIMMUNE HEPATITIS: ICD-10-CM

## 2024-08-20 PROCEDURE — 93975 VASCULAR STUDY: CPT

## 2024-08-20 PROCEDURE — 76705 ECHO EXAM OF ABDOMEN: CPT

## 2024-08-21 ENCOUNTER — TELEPHONE ENCOUNTER (OUTPATIENT)
Dept: URBAN - METROPOLITAN AREA CLINIC 86 | Facility: CLINIC | Age: 49
End: 2024-08-21

## 2024-08-21 NOTE — HPI-TODAY'S VISIT:
Dear Chanda Sarmiento,  Aug 20 u.s liver was homogeneous in echotexture and common bile duct measures 2mm.  Gallbladder views show post gb surgery status.  No ascites.  Right kidney 10cm and no hydronephrosis.  Vessels open for liver and spleen and spleen normal at 7cm.  They overall felt u.s was normal re parenchymal exam and echotexture and patent vessels and great to see as means liver is doing well and no obvious cumulative damage.  Dr Issa

## 2024-08-27 ENCOUNTER — LAB OUTSIDE AN ENCOUNTER (OUTPATIENT)
Dept: URBAN - METROPOLITAN AREA TELEHEALTH 2 | Facility: TELEHEALTH | Age: 49
End: 2024-08-27

## 2024-09-06 ENCOUNTER — TELEPHONE ENCOUNTER (OUTPATIENT)
Dept: URBAN - METROPOLITAN AREA CLINIC 86 | Facility: CLINIC | Age: 49
End: 2024-09-06

## 2024-09-06 NOTE — HPI-TODAY'S VISIT:
Dear Chanda Sarmiento,  The September 5 labs were sent to us.  Dr. Issa is out of the office and I am helping monitor his labs while he is away.  He will be back next week.  The bilirubin was 1.0, alkaline phosphatase 260, AST 36, ALT 53.  Previously back in July the alkaline phosphatase was 167 and the AST was 25 and the ALT was 31.  These are higher.  What changed?  Any new medications?  Please let us know. Payton Gonzalez PA-C

## 2024-10-16 ENCOUNTER — OFFICE VISIT (OUTPATIENT)
Dept: URBAN - METROPOLITAN AREA TELEHEALTH 2 | Facility: TELEHEALTH | Age: 49
End: 2024-10-16
Payer: COMMERCIAL

## 2024-10-16 ENCOUNTER — LAB OUTSIDE AN ENCOUNTER (OUTPATIENT)
Dept: URBAN - METROPOLITAN AREA TELEHEALTH 2 | Facility: TELEHEALTH | Age: 49
End: 2024-10-16

## 2024-10-16 VITALS — WEIGHT: 114 LBS | BODY MASS INDEX: 20.98 KG/M2 | HEIGHT: 62 IN

## 2024-10-16 DIAGNOSIS — K75.4 AUTOIMMUNE HEPATITIS: ICD-10-CM

## 2024-10-16 DIAGNOSIS — F10.11 ALCOHOL USE DISORDER, MILD, IN EARLY REMISSION, ABUSE: ICD-10-CM

## 2024-10-16 DIAGNOSIS — Z71.6 TOBACCO ABUSE COUNSELING: ICD-10-CM

## 2024-10-16 DIAGNOSIS — R74.8 ABNORMAL LIVER ENZYMES: ICD-10-CM

## 2024-10-16 DIAGNOSIS — F33.1 MODERATE EPISODE OF RECURRENT MAJOR DEPRESSIVE DISORDER: ICD-10-CM

## 2024-10-16 DIAGNOSIS — K71.9 DRUG-INDUCED LIVER DISEASE: ICD-10-CM

## 2024-10-16 DIAGNOSIS — Z71.89 VACCINE COUNSELING: ICD-10-CM

## 2024-10-16 DIAGNOSIS — D80.2 IGA DEFICIENCY: ICD-10-CM

## 2024-10-16 DIAGNOSIS — G44.009: ICD-10-CM

## 2024-10-16 DIAGNOSIS — Z79.899 HIGH RISK MEDICATION USE: ICD-10-CM

## 2024-10-16 DIAGNOSIS — K63.5 POLYP OF COLON, UNSPECIFIED PART OF COLON, UNSPECIFIED TYPE: ICD-10-CM

## 2024-10-16 PROCEDURE — 99214 OFFICE O/P EST MOD 30 MIN: CPT

## 2024-10-16 RX ORDER — VORTIOXETINE 20 MG/1
1 TABLET TABLET, FILM COATED ORAL ONCE A DAY
Status: ACTIVE | COMMUNITY

## 2024-10-16 RX ORDER — VARENICLINE 0.03 MG/.05ML
1 SPRAY IN EACH NOSTRIL SPRAY NASAL TWICE A DAY
Status: ACTIVE | COMMUNITY

## 2024-10-16 RX ORDER — ATORVASTATIN CALCIUM 20 MG/1
1 TABLET TABLET, FILM COATED ORAL ONCE A DAY
Status: ACTIVE | COMMUNITY

## 2024-10-16 RX ORDER — ZIPRASIDONE HYDROCHLORIDE 60 MG/1
1 CAPSULE WITH FOOD CAPSULE ORAL
Status: ACTIVE | COMMUNITY

## 2024-10-16 RX ORDER — URSODIOL 300 MG/1
1 CAPSULE CAPSULE ORAL
Qty: 180 | Refills: 1

## 2024-10-16 RX ORDER — URSODIOL 300 MG/1
1 CAPSULE CAPSULE ORAL
Qty: 180 | Refills: 1 | Status: ACTIVE | COMMUNITY

## 2024-10-16 RX ORDER — LISDEXAMFETAMINE DIMESYLATE 60 MG/1
1 TABLET IN THE MORNING TABLET, CHEWABLE ORAL ONCE A DAY
Status: ACTIVE | COMMUNITY

## 2024-10-16 RX ORDER — BUDESONIDE, GLYCOPYRROLATE, AND FORMOTEROL FUMARATE 160; 9; 4.8 UG/1; UG/1; UG/1
2 PUFFS AEROSOL, METERED RESPIRATORY (INHALATION) TWICE A DAY
Status: ACTIVE | COMMUNITY

## 2024-10-16 RX ORDER — DIAZEPAM 5 MG/1
1 TABLET AS NEEDED TABLET ORAL ONCE A DAY
Status: ACTIVE | COMMUNITY

## 2024-10-16 NOTE — HPI-TODAY'S VISIT:
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  She is working for [employer]. Visiting family in [facility]. Oct 20.  The September 5 labs sent the bilirubin was 1.0, alkaline phosphatase 260, AST 36, ALT 53. Previously back in July the alkaline phosphatase was 167 and the AST was 25 and the ALT was 31. These are higher.   No new meds. Need to redo when back 10-21.  Asked what was newer med or change and she says eye drops.  Aug 20 u.s liver was homogeneous in echotexture and common bile duct measures 2mm. Gallbladder views show post gb surgery status. No ascites. Right kidney 10cm and no hydronephrosis. Vessels open for liver and spleen and spleen normal at 7cm. They overall felt u.s was normal re parenchymal exam and echotexture and patent vessels and great to see as means liver is doing well and no obvious cumulative damage.   July 26 total protein 70 and alb 4.3 and tb 0.5 and direct bili 0.15 and alk 167 elevated and lower from 184. Ast 25 and alt 31 and prior ast 37 and alt 70.  June 25 labs show total protein 7.5, albumin 4.7, bilirubin 0.8 and direct bilirubin 0.2 and these are all normal. Your alk phos is coming down to 184 from 202 AST about the same at 37 from 25 and ALT went up to 70 from 60.  She says she had the doxepin that was removed 2-3 m and switched to valium and after the valium took temazepam and just stopped and back to valium.  May 23 labs show total protein 7.0 and alb 4.3 and tb 0.8 and direct 0.23 and alk 202 and prior 150 on their listed comparison but see below for more recent one. Ast 35 and alt 60.  Earlier May 2024 labs alk 212 and ast 48 and alt 68 and so  ast and alt seem to be lower now and also see a minimal drop seen in alk phos to 202.  May 2: Glucose 109 and elevated and not clear if fasting. Bun 14 and cr 0.77 and na 137 and k 3.9 and cl 103 and co2 21 and ca 9.8 and alb 4.2 and Tb 0.8 and alk 212 and prior 181 and so higher. Ast 48 and alt 68 also up and prior ast 18 and alt 28 so suggests a new medicine cause. Wbc 9.4 and hg 14.4 and hct 44 and mcv 95 and platelets 366. Neutrophils 5.6 and lymphs 2.6.   January 5, 2024 ultrasound finally was routed to your chart. Part of the problem was the name change (they has [place] listed) and  I think that threw them off even though the date of birth is the same. Pancreas was unremarkable. Liver was homogeneous/even in echotexture and with no discrete lesions. Liver vessels were patent as expected. Common bile duct was normal at 3 mm. Right kidney was 10.2 cm with no hydronephrosis. Spleen normal at 7.5 cm. Splenic vessels patent.  January 2 labs show WBC normal at 8.8 hemoglobin 15.2 platelet count 376 MCV 94. Neutrophils and lymphocytes both normal. Glucose slightly low at 66 with normal for that lab being 70-99. BUN of 10 creatinine 0.78 sodium 143 potassium 4.3 calcium 10.2 albumin 4.6 bilirubin 0.5 and only your alkaline phosphatase was elevated at 181. AST was normal at 18 and ALT was normal at 28. Prior labs that you did in September 18 had shown your alk 172  AST 23 and ALT 35 so these appear to be lower now than then. Unclear why you are alk phos is isolated up and we may want to talk about fractionating your alkaline phosphatase. It is still possible that a medication could be causing that to be off as well so lets go over your medicine list at the upcoming visit and review these options with you.  Trintellix started in Dec and remains on it.  Oct 20 2023 glu 129 and bun 9 and cr 0.9 and na 140 and k 5.0 and cl 101 and co2 23 and ca 10.3 and alb 4.6 and tb 0.9 and alk 149 and ast 23 and alt 39.   September 18 sent in. Glucose was slightly up at 102 BUN of 14 creatinine 0.86 sodium 140 potassium 4.7 calcium 10.2 albumin 4.4 bilirubin 0.4 alk phos elevated at 172 and back in September 11 of been 167. Your AST was higher at 23 and ALT higher at 35 and previous in September 11 your AST was 13 and ALT of 17. Sed rate was noted to be 2 and C-reactive protein was 1 and PTH was 33. September 11 labs show WBC 7.7 hemoglobin elevated 16.7 plate count 423 MCV 93 with a BUN of 9 creatinine 0.96 sodium 142 potassium 5.2 elevated calcium of 10.6. AST then again 13 and ALT of 17 alk phos 167. Cholesterol was up at 270 and triglycerides were up at 378 and HDL 48 and  so those were definitely off. INR normal at 1.0. B12 normal at 637 and folate normal at 9.9. Hemoglobin A1c slightly up at 5.8 so you need to watch that as you may be moving into prediabetes. TSH 1.67. Uric acid was 3.2. For comparison back in February your AST was 25 and ALT 29 alk phos 161. Very important to see what are you doing differently between September 11 and the September 18 labs.  Full labs from July 22 sent in. You read me the essential ones but the full labs showed glucose 72 BUN of 8 creatinine 0.64 sodium 137 potassium 4.2 chloride 106 CO2 of 22 BUN 4.4 bilirubin 0.7 alk phos elevated at 172 AST 24 and ALT 20. Back in May your alk phos of been 157 so that appears to be a little higher. TSH 0.76. WBC 7.1, hemoglobin up at 16.6 hematocrit up at 50.8 plate count 436 elevated MCV 99. Neutrophils 3479 lymphocytes 2918. Hep B core total negative. RPR negative. HIV test negative. Hep B surface antigen negative. Hep C antibody negative. July 22 drug screen had been positive for alcohol metabolites, amphetamine, and marijuana.  She is 9 m without any alcohol.  August 30 ultrasound shows liver echogenicity to be normal with no focal lesions. Common bile duct was normal at 3 mm. Prior cholecystectomy changes were seen. No ascites was seen. Visualized pancreas portions appeared unremarkable. Liver vessels were patent. Spleen was normal at 7.4 cm. Right kidney 10.2 cm with no hydronephrosis. In summary you have a normal-appearing liver and patent vessels.  Prior cholecystectomy changes were seen.  You mentioned she justy left rehab x 28 days and she says sent to [facility].  July 22: last ones: ast 24 and alt 20 and alk 172 up and hg 16.6 cr 0.64. She sent to [facility].  She has been off the alcohol now 41 day.  Labs may be better now as further than the last labs.  Pt last visit had beenm having some alcohol but she has cut back some and she is following with therapy and having 8 ounces and day and if have more than 2-3 drinks a day at risk for cirrhosis,  May 11 2023 glu 70 and and bun 11 and cr 0.68 and na 136 and k 4 and cl 99 and co2 26 and ca 10.3 and alb 4.6 and tb 1.1 and alk 157 and ast 34 and alt 60 and mg 2.1 and tsh 1.10 and wbc 9.3 and hg 15.9 and hct 45.5 and platelet 362, mcv 93.4  neutrophils 6371 and lymphs 2018.b432 and folate 14 vit d 78 and ethyl alcohol and over [id number].  Re meds: none in rehab and stratera 60mg qd and 40mg escitalopram and geodon 60mg po qd.  February 6 showed that the pancreas was unremarkable, the liver was homogeneous/even in echotexture with no discrete lesions. Liver vessels were patent. Common bile duct was normal at 2 mm.  Right kidney was 10.8 cm with no hydronephrosis. Spleen was normal at 7.9 cm with splenic vessels patent.  Pt says she was drinking more in 2020 and not until this past year. 6m of this,  Smoking 1/2 ppd and need to work on this.  She says mother is doing study with [facility]. Doing a clinical study for her lungs and for chronic bronchitis.  She says she had labs done last week. Rheum saw.  2-7-2023 and wbc 8.6 hg 15 and plat 387 and neutrophils 4.9 and lymph 2.7 and gly 89 and bun 11 and cr 0.85 and na 140 and k 4.2 and cl 99 and co2 24 and alb 4.7 and tb 0.9 and alk 161 and ast 25 and alt 29.  Alcohol could not sense with the vyvance.  She was changed from adderall to vyvanse and maybe for 3m. She is on ursodiol 300mg po tid.  9/14/22 labs were sent to us. White blood cell count 10.8, hemoglobin 13.2, MCV 95, platelets 337, lymphocytes 2.3, neutrophils 7.3 which is slightly elevated please make sure your primary care doctor is aware. Glucose 98, creatinine 0.65, sodium 142, potassium 4.1, bilirubin 0.5, alkaline phosphatase 146, AST 17, ALT 35. The alkaline phosphatase and the AST are trending in the right direction. Slight increase in the alt, previousl it was 31. We will continue to monitor this. Also of note the protein was slightly low at 5.9 , please share with your primary care.  Aug 5: glu 93 and bun 6 and cr 0.92 and na 141 and k 4.3 and cl 102 and co2 23. Wbc 10.4 and hg 14.6 and hct 44.3 and plat 404.  Mcv 95. Neutrophils 7.3 little up and prior normal 6.8. Lymphs 2.1. Alb 4.4 and tb 0.8 and db 0.22 and alk 150 ast 27 and alt 23.  Prior June 27: alk 183 and ast 18 and alt 31. So labs trending right way with some variation in ast and alt but still in normal range for ast and alt. No clear acute issues.  We will contact k[facility]ha for the u.s done feb 3.  Aug 5  ultrasound shows liver normal in contour and echogenicity.   No suspicious liver lesions. Common bile duct was normal at 2.4 mm and gallbladder absent. Right kidney 9.6 cm with no hydronephrosis. Spleen normal at 8.6 cm. Imaged pancreas portions unremarkable with tail partially not well seen due to gas. Liver vessels patent. Overall good to see that there was no focal lesions and no suspicion for any fibrotic morphology.  She started to drink again and she is having like tequila and has 1 ounce and doing 6 ounces on a rare day and 3 ounces a day. Started 1 month ago.  Pt says labs in Jan 2022 were up and she was ordered a medicine that had diclofenac and Geodon in Jan. She is stil on the geodon at 40mg.  June 27 labs show glucose normal at 86 BUN of 10 creatinine 0.93 down from 0.94.  Sodium 141 potassium 3.9 chloride 103 CO2 24. White blood count still up at 11 but down from 13.5 with.  Hemoglobin 13.2 which is stable.  Platelet count normal at 363 from 411.  MCV normal at 92.  Neutrophils normal at 6.8 and lymphocytes 3.0. Albumin 4.1 bilirubin 1.1 direct bilirubin 0.3.  Alk phos down to 183 from 213.  AST down to 18 and ALT down to 31 from previous AST 17 and ALT 35.  Ideal ALT is less than 25.   June 8 labs showed glucose elevated at 123 and previously was normal but has occasionally been elevated as back in May 3 it was 108.  Please share with primary provider.  Creatinine down to 0.94 from 1.15 so doing better there.  Sodium 138 potassium 4.2 chloride 103 CO2 of 22. White blood  cell count up at 13.5 from 10.3.  Were you ill recently?  Hemoglobin 13.2 platelet count down to normal at 411 from 452.  Neutrophils up at 10.8 and again wonder if he had a recent infection or vaccine?  Lymphocytes 2.1 normal. Albumin 4.5 bilirubin 0.6 which is stable direct bilirubin 0.2.  Alkaline phosphatase slightly lowered to 13 from 220 AST low at 17 from 21 previously 38.  ALT still slightly up at 35 but previously 35 and back on May 3 have been 65. Overall liver labs are dropping but slowing down on that. Any new meds? Maybe role of recent illness?  May 19 labs show glucose down to 97 from 108 before.  BUN of 10.  Creatinine still remains slightly up at 1.15 from 1.13.  He to stay hydrated with the increasing temperatures. Sodium 135 potassium 4.7 chloride 96 CO2 of 21. White blood cell count 10.3 hemoglobin 14.1 platelet count slightly up at 452 and was previously 430.  MCV 92.  Neutrophils 6.4 lymphocytes 2.7 normal. Albumin 4.6 bilirubin 0.6 which is down from 1.0 and prior 1.4.  Direct bilirubin 0.21.  Alk phos remains at 220 was previously 220 and prior 180.  AST down to 21 and previously 38.  ALT down to 35 from previously 65.  May 3 labs show glucose slightly up at 108 and may be not fasting that day?  BUN of 12 creatinine elevated at 1.13 previously 1.06.  Sodium 139 potassium 4.5 chloride 101 CO2 21.  The chloride and CO2 are back to normal which they were off last time. White blood cell count down to 10 hemoglobin 14.8 platelet count 430 MCV 90 neutrophils normal at 6.5 lymphocytes normal now at 2.5. Albumin 4.7 also down from 5.0.  Bilirubin normal now at 1.0 alkaline phosphatase still elevated though at 220 previously was 180.  AST was 38 and ALT 65.  Previously AST 27 and ALT 44. Called pt and no new meds. Did drop citalopram to 10mg po qd and she is now off. Soumya 300mg po tid for 2 weeks or so. Asked if gaining weight and she is not. Smoking a lot of cigarettes and I have seen that before but not common to see. She will work on this and see how she does. She will try to work on that. Redo the labs in 2 weeks.  She stopped her lisinopril and changed to amlodipine and did not start as bp is low. 95/71.  January 19, 2022 ultrasound sent in. Liver appeared to be increased abd coarsened in regards to echogenicity.  No definite liver lesions.  No dilated ducts. Normal hepatopetal flow seen in the main portal vein.  Imaged hepatic veins also were patent. Common bile duct normal at 5 mm. Gallbladder absent. Right kidney 10.6 cm with no hydronephrosis. Left kidney 10.5 cm with no hydronephrosis.  Spleen normal at 8.7 cm. They suspected that the liver could have moderate diffuse fatty infiltration.  I would state that this was also at the time that you are having a medication effect so it could be that threw that reading off.     April 4, 2022 labs show glucose 93 which is normal BUN of 9 creatinine slightly up at 1.05 and was previously 1.01. May want to look at your hydrational state. Sodium 139 potassium 4.7 chloride 99 CO2 of 22. Albumin 4.8 bilirubin 1.2 which is normal and direct bilirubin 0.28 suggesting that you have Gilbert's syndrome or that indirect hyperbilirubinemia that is benign and present about 10% of people. Alkaline phosphatase down to 187 from 202 and down previously from 230.  AST of 24 down from 36 and prior 49. ALT 33 down from 41 and ALT 76.  She had a prior bump in lfts from working on epoxys.  Making cups.  She also asked re asa and crestor and  she has not started the aspirin yet and she was to start to crestor and she will start post the next labs.  March 21 labs show glucose still slightly up at 104 previously 106.  BUN of 7 and creatinine down to 1.01 from 1.19 so that was better.  Sodium 136 potassium 4.2 chloride 101 CO2 slightly low at 18. Total bilirubin normal at 1.1 previously 1.2 direct bilirubin 0.29 and I would suggest that you have Gilbert's.  Gilbert's as you recall is that indirect hyperbilirubinemia that is very common and present about 10% of people. The alkaline phosphatase is down to 202 from 230 and the AST is down to 36 from 49 and ALT 41 from 76.  It would appear that you change something? White blood cell count 9.7 hemoglobin 14.1 platelet count 444 down from 499. Important to note the white cell count also came down from 13.4 to 9.7.  Neutrophils 6.8 and lymphocytes 2.1 both normal. Called pt re the labs. She says did not change meds. 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   March 9 labs show white blood cell count 13.4 up from 11.7 and prior 10.7.  Hemoglobin 14.6.  Platelet count up a little at 499 from 445.  It has been variable before though.  MCV normal at 90.  Neutrophils up at 9.5 and lymphocytes 2.5 down from last time. Glucose 106 slightly up and BUN of 9 creatinine slightly higher at 1.9 and previously 1.15.   Sodium 137 potassium 4.1 chloride 100 CO2 slightly low at 19.  Albumin 4.0.  Total bilirubin 1.2 normal.  Direct bilirubin 0.35.  Alkaline phosphatase up to 233.  AST 49 and ALT 76 and previously AST 29 ALT 40. Called pt as labs trending up. Meds: propanol, furosemide, spirinolactone, basagalar, Spiriva and Symbicort, Geodon and lamotrigine. Ursodiol. Lamotrigine 1% risk for incl fts and on for a year. Geodon: category c for liver injury and occurs within 1-4 weeks and she has been on it for 6-8 weeks. She will discuss with psych re this.   March 3 labs show liver alkaline phosphatase fraction is 76% bone fraction is 24% and intestinal 0%  so clearly the alkaline phosphatase is coming mainly from liver.  This is the normal pattern that one would expect. March 3 labs also show albumin slightly up at 4.9 from 4.7.  Could reflect hydrational status. Total bilirubin normal at 1.2 and direct bilirubin 0.32 suggesting a Gilbert's syndrome which is a benign finding. Alkaline phosphatase remains elevated at 203 and previously was 175.  AST 29 and ALT 40 and previously AST 20 and ALT 34. Unfortunately these labs arrived very late so we need to go over with you what medicines you are on and since when.  They are not dramatically worse but they are definitely drifting up.  February 24 labs show white blood cell count elevated at 11.7 previously was 10.7.  Hemoglobin 14.5 normal platelet count 445 down from 468.  MCV 92.  Neutrophils elevated at 7.1 and lymphocytes 3.4.  Have you been ill?  Glucose 107 BUN of 12 creatinine 1.15 down from 1.26.  Normal is from 0.57 up to 1.0 so still elevated.  Sodium 137 k 3.8 chloride 98 CO2 of 22.  Albumin 4.7 normal now.  Limit 0.8 direct bilirubin 0.18 and alkaline phosphatase elevated at 175.  Previously 107.  AST 20 and ALT 34.  Previously AST 26 and ALT 30.  Need to watch this evolving alkaline phosphatase trend. Called pt: she had surgery on the right elbow and so this could be from bone and had moved ulnar nerve. She also is on some prednisone and she is on prednisone 2.5mg po qd. We need to fractionate the alkaline phosphatase. We can redo the labs next week to check on that.  February 2 labs show white blood cell count 10.7 hemoglobin 14.4 platelet count up at 468.  MCV was 90 which is normal.  Neutrophils 6.3 and lymphocytes elevated at 3.3. Glucose slightly up at 102 BUN of 13 creatinine elevated at 1.26 sodium 139 potassium 3.9 chloride 99 CO2 of 22 albumin elevated at 4.9 bilirubin 0.8 direct bilirubin 0.21 alkaline phosphatase 107 AST 26 and ALT 30 so clearly that must have been that herbal smooth product that you were on and the other herbals and we can reassess from there. Called pt: she had ketorolac also. Start the ursodiol 300mg po bid with food. She will do that and follow labs.  Started atorvastatin since dec 2020 and she is still on that for now and then to do the [facility],  January 4 local labs sent in. Glucose 159 elevated please share with primary provider.  He had a 16 creatinine 0.96 sodium 138 potassium 4.2 chloride 99 calcium 9.9 total bili 0.4 alkaline phosphatase 202 and . Prior December 29 labs showed alk phos 114 AST 28 and ALT 45 so clearly those numbers bumped. White blood cell count 8.4 hemoglobin 13.2 platelet count 350. Cholesterol 226 elevated.  Triglyceride 284 elevated.   elevated.  Hemoglobin A1c slightly better at 7.9 from 8.1%.  TSH 1.78.  Magnesium 2.2. Called pt: need new labs to see what labs doing.  Dec 29 labs: ast 48 and alt 31 and alk 111 and tb 0.4.  December 13 2021  labs show white blood  cell count elevated 11.9 but down from 12.6.  Hemoglobin 15 and platelets up at 503 and previously 394.  That can sometimes go up with inflammation or medicines.  Please be sure to share this info with primary providers. MCV normal at 90.  Neutrophils elevated 7.4 but down from 10.1 back on December 2.  Lymphocytes normal at 3.1 and monocytes slightly up at 1.0. Glucose elevated at 149 and was previously 114.  Hemoglobin A1c up to 8.1 and was previously 6.3.  You should look at this sugar issue again with your primary provider as that is unusual for you. BUN of 12 creatinine 1.19 which is up from 1.014 sodium 136 potassium 4.2 chloride 98 CO2 20. Calcium at 10.4 and was previously normal at 9.6 and I wonder if it is medicine related? Albumin 4.6 bilirubin 0.8 alkaline phosphatase slightly higher at 130 previously 126. AST down to 32 and ALT down to 49 previously AST 65 and ALT 66. C-reactive protein noted to be normal at 2.  Dec 1 labs much better: Tp 6.9 and alb 4.2 and tb 0.2 and db 0.10 and alk 111 and ast 19 and alt 43 and down from prior alk 301 and ast 77 and alt 171. So doing much better. Keep track of any new meds added or removed.  November 98 labs show 123 which is elevated BUN of 18 creatinine 0.99 sodium 137 potassium 5.1 chloride 99 CO2 of 24 albumin 4.7 bilirubin 0.5 alkaline phosphatase down to 118 from 194.  AST 29 and previously 21 ALT down to 46 from 53.  So clearly centimeters drop. She says she had increased the Seroquel and then being weaned on it now. She is now on 50 mg of Seroquel and she is to stop it tonight and she will redo labs in 1-2 weeks to be sure.  Ca 10.3 little up and she is not on supplements for this and follow course. She will let primary md know.  Oct 12: alb 4.5 and tb 0.6 and db 0.19 and alk 301 and ast 77 and alt 171 and prior ast 33 and alt 52. went to 25 and 50 and now 100mg.  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 Says taking dilaudid and ibuprofen for that. She says taking a dilaudid occ and one ibuprofen. 10-1 labs also off. Still on steroids 10mg po qd for the rheum arthritis so enbrel stopped working and went to this. She did myasthenia screen and neg and started on this and trying to do humira sunday.   10-1 alk 199 and ast 56 and alt 149.   September 21 ultrasound showed the liver to be 15.6 cm with mild hepatic steatosis but no focal mass. Liver vessels are noted to be patent. Prior cholecystectomy changes seen. No bile duct dilation seen with common bile duct 5 mm. Visualized pancreas portions were unremarkable proportions of the head and tail the pancreas were not seen due to gas. Right kidney 10.6 cm and left kidney 10.8 cm with no hydronephrosis. Spleen normal at 8.5 cm. Mild liver fat overall was seen patent vessels were seen. No ascites was seen.  September 21 labs show albumin 4.9 which remains slightly up bilirubin 0.4 which is down from 0.8 direct bilirubin 0.13 which is down from 0.23. Alkaline phosphatase down to 198 from 207 AST slightly up at 33 from 28 ALT 52 down from 66.  September 9 redo labs show albumin stable 4.9 bilirubin 0.8 direct 0.23 alkaline phosphatase slightly lower at 207 from 215. AST down to 28 from 61. ALT down to 66 from 86.  So it does appear the labs are dropping with lamictal drop 150 to 100mg and then went up on seroquel. Sept 7 labs: alb 4.8 and tb 0.7 and db 0.2 and alk 215 and Ast 61 and alt 86.  Prior aug 27 albs ast 18 and alt 28 and alk 135. that was pre lamictal changed.  She had prior drinking green tea for a few weeks. She has an immune issue and so the labs spiked up. then stopped and labs settled last year off that.  Pt was to do a bx and trouble breathing and Dr Self told to do barium swallow and manometry and did the barium and to do the manometry and says told was ok.  Ent saw some plaques and she had bx done.  Pelvic area pain noted and se started with pain and went to er and went [facility] to [facility] and she said cyst in fallopian tube and ruptured and did a full hysterectomy and that was July 28 2021.  Ex  burned her citizenship certificate. She said she applied to get it. [phone] for that.  Enbrel started earlier for seronegative RA.  She says needed onc re wbc up and asked him to do the lfts and she says part of labs back and she has from aug 8 2021. Ast and alt done and alk 209 and prior 138 and ast 149 and alt prior 34 and alt 231 and 76 july 19. b12 1804 and IGA 68 little low.  May 3:wbc 9.6 hg 13.7 and plat 355 and mcv 93. Neutrophils 6.5. glu 92 and cr 0.95 and na 137 and k 4.3 and ca 9.8 and alb 4.5 and tb 0.3 and alk 177 elevated and ast 30 and alt 70 and tesosterone level 6 normal per chart for your age. Prior alk 277 and ast 126 and alt 315. So came down post that doxycycline exposure.  April 6, 2021 u[facility]s at [facility]: pancreas appears normal where seen and liver demonstrates normal size and contour and echogenicity. Liver vessels patent. Gallbladder absent. Common bile duct 4mm. No hydronephrosis seen to kidneys. Spleen 8.4cm. No liver mass seen.  Jan 11: glu 171 elevated and show local md. cr 1.06 and na 137 and k 4.0 and cl 98 and co2 21 and ca 9.7 normal and alb 4.6 and tb 0.7 and alk 88 and ast 13 and alt 16. So liver labs doing very well. A1c 5.9% noted.  Jan 20 2021: ct no cardiomegaly. Clear lung bases. Unremarkable liver. Prior gb surgery changes. Unremarkable spleen. Pancreas and and adrenal glands normal. cyst right kidney. left renal cyst. Mild rectosigmoid wall thickening. Liver vessels patent. No bulk adenopath.  Nov 9 2020 labs: wbc 13. 4hg 14 and plat 453 elevated slightly (150- 450 normal). na 135 k 4.6 and cl 97 and co2 26 and calcium elevated 11.0.  Show to local md: why is calcium up?alb 4.5 and tb 0.6 and alk 165 elevated and ast 22 and alt 36 so back down. chol 336 elevated and trg 307 and ldl 219. Lipids off and not sure if due to recent liver flare or other and may want to redo that as known if liver flared that this can be off. Vit d low 28.8. a1c 5.9%.  She says has had low vit d and she was to start this and she was started on 5000 a day for 4 m and she had been taking some vit d. She says she had stopped it for surgery July and restarted it again.  She says was on doxycycline and said had a prior hx of tcn reaction and filled and she said she may have been more ill from it. No reexposure.  Nov 3 2020 : ast 50 and alt 121 (both down then from 121 and 222). main she is off green tea and the lamictal also off prior. Suspect green tea was immune stimulant reving up your immune system. She has a very active immune system.  Rheum says she has fibromyalgia and doing water therapy.  11-2-20: ast down to 50 and alt 118 and prior 121 and alt 222 . alk 213 still up from 210 and tb down to 0.4 from 0.5.  Oct 26 labs tb 0.5 and alk 210 and ast 121 and alt 222. alk 248 and so now 210 so that is better, and ast 128 and that is now 121 and alt was 248 and now 222 so better and we need to follow.  Oct 19 labs spiked again: alk 198 and ast 128 and alt 248. Tb 0.5. na 136 and k k 4.5. bun 15 and cr 0.94 and glu 112.   Oct 6 ast 16 and alt 25 and alk 111. Prior she was also on abx for 6 weeks and so finished oct 8 or so and so that not the cause.  Oct 6: wbc 11.2 little up, hg 14.4 and hct 42.2, plat 141. Mcv 90, glu 103 little up and maybe not fasting. Bun 20 and cr 0.98 and na 135 and k 4.5, cl 99 and co2 22. Alb 4.2 and tb 0.5 and alk 111 and ast 16 and alt 25.  Prior visit lamictal came down from 75/50 to 50/25 as of oct 9 and added gabapentin 400mg and seroquel 25mg po qhs an atorvastatin 40mg po qhs.  Off the prior nasal exposures also.  Aunt Ewelina passed in oct 2020 had oltx and she had failed from 2nd tx. Not from covid 19.  U.s aug 2020 [facility]: they saw mild fatty infiltration of the liver. Prior gb surgery changes seen. Visualized pancreas portions normal. No liver masses. Common duct 4mm. Portal vein patent. Kidneys unremarkable. Spleen 7.8cm unremarkable. Should get better as time passes from the issues we discussed.  Aug 4 2020 labs: glu 61 and little low bun 17 and cr 0.91 and na 135 and k 5.2 and cl 96 and co2 20 and ca 10.9 elevated and show local md. alb 4.7 and tb 0.6 and alk 104 and ast 24 and alt 28.  She was on ursodiol for has been on it for her liver started july 29 and she did stop it . She is staying off this as last time ok and may need it.  Nov 25 2020: wbc 11.3 and hg 14.1 plat 484 elevated and neutrophils 7.7 and glu 144 elevated and cr 1.08 little up.na 139 and k 4.3 and cl 99 and co2 24 and ca 10 and alb 4,7 and tb 0.3 and alk 130 and ast 15 and alt 18. Addendum: 1. Smoothie immune booster did. 2. Michelle took that. 3. She will confirm medicine.  Last time had been down 120 and now 110 in aug.   Plan: 1. We need labs when back in town 10-21. 2. She will keep track and look 7-26 to 9-5 labs and see if can recall what dose changed or med changed? Polo henriquez. 3. If thinks of med changes that could explain issues. 4. Sometimes may settle on a medicine over time.  Addendum: le for a few weeks pre labs in sept: Serum aminotransferase elevations above 3 times the upper limit of normal occurred in 2% to 4% of patients treated with cariprazine in preregistration studies compared with 0.7% to 2% of placebo recipients. Elevations above 5 times ULN were rare <1% and no patient developed clinically apparent liver injury with jaundice or symptoms. Nevertheless, an occasional patient was withdrawn from therapy because of serum aminotransferase elevations usually arising within the first month of treatment and resolving rapidly with drug discontinuation. Since its approval and more widescale use, there have been no published cases of clinically apparent liver injury although the product label lists hepatitis as a possible adverse side effect. Likelihood score: E* (unproven but suspected rare cause of clinically apparent liver injury).   Duration of visit was  30 min with 10 min of prep and loading multiple labs done and reviewed to ecw and then 20 min by lock from 740 to 800 am as healow clock off due to fluxes for this healow telemed with time spent reviewing chart and events with the patient and in discussing plans.

## 2024-10-21 ENCOUNTER — LAB OUTSIDE AN ENCOUNTER (OUTPATIENT)
Dept: URBAN - METROPOLITAN AREA TELEHEALTH 2 | Facility: TELEHEALTH | Age: 49
End: 2024-10-21

## 2024-10-22 ENCOUNTER — TELEPHONE ENCOUNTER (OUTPATIENT)
Dept: URBAN - METROPOLITAN AREA CLINIC 86 | Facility: CLINIC | Age: 49
End: 2024-10-22

## 2024-10-22 LAB
ALBUMIN: 4.5
ALKALINE PHOSPHATASE: 161
ALT (SGPT): 19
AST (SGOT): 21
BASO (ABSOLUTE): 0.1
BASOS: 1
BILIRUBIN, TOTAL: 1
BUN/CREATININE RATIO: 13
BUN: 9
CALCIUM: 10.3
CARBON DIOXIDE, TOTAL: 21
CHLORIDE: 107
CREATININE: 0.68
EGFR: 106
EOS (ABSOLUTE): 0.2
EOS: 3
GLOBULIN, TOTAL: 2.7
GLUCOSE: 89
HEMATOCRIT: 45.6
HEMATOLOGY COMMENTS:: (no result)
HEMOGLOBIN: 14.8
IMMATURE CELLS: (no result)
IMMATURE GRANS (ABS): 0
IMMATURE GRANULOCYTES: 0
LYMPHS (ABSOLUTE): 2.1
LYMPHS: 30
MCH: 30.8
MCHC: 32.5
MCV: 95
MONOCYTES(ABSOLUTE): 0.6
MONOCYTES: 9
NEUTROPHILS (ABSOLUTE): 4.2
NEUTROPHILS: 57
NRBC: (no result)
PLATELETS: 342
POTASSIUM: 4.7
PROTEIN, TOTAL: 7.2
RBC: 4.8
RDW: 12.2
SODIUM: 144
WBC: 7.3

## 2024-10-22 NOTE — HPI-TODAY'S VISIT:
Dear Chanda Sarmiento, October 21 labs showed WBC 7.3 hemoglobin 14.8 platelet count 342 MCV 95 and neutrophils and lymphocytes normal at 4.2 and 2.1 respectively. Glucose normal at 89 and BUN of 9 creatinine 0.68 sodium 144 potassium 4.7. Chloride a little bit up to 107 and calcium level 10.3.  Albumin on the vitamin D or calcium supplements lately?  Albumin 4.5 and bilirubin normal at 1.0.  The alk phos that was elevated back in May at 212 with the AST of 48 and ALT of 68 has improved now with the alk phos down to 161 and AST of 21 and ALT of 19.   The July 26 labs that showed an alk phos of 167 AST 25 and ALT of 31 so these labs are definitely a little lower than those but not back to baseline yet.   If the alk phos is not normalized  soon we can talk about fractionating it to see if it is elevated due to bone or liver or other source. Dr. Issa

## 2024-10-29 ENCOUNTER — WEB ENCOUNTER (OUTPATIENT)
Dept: URBAN - METROPOLITAN AREA CLINIC 86 | Facility: CLINIC | Age: 49
End: 2024-10-29

## 2024-11-04 ENCOUNTER — LAB OUTSIDE AN ENCOUNTER (OUTPATIENT)
Dept: URBAN - METROPOLITAN AREA TELEHEALTH 2 | Facility: TELEHEALTH | Age: 49
End: 2024-11-04

## 2024-11-08 ENCOUNTER — TELEPHONE ENCOUNTER (OUTPATIENT)
Dept: URBAN - METROPOLITAN AREA CLINIC 86 | Facility: CLINIC | Age: 49
End: 2024-11-08

## 2024-11-08 ENCOUNTER — WEB ENCOUNTER (OUTPATIENT)
Dept: URBAN - METROPOLITAN AREA CLINIC 86 | Facility: CLINIC | Age: 49
End: 2024-11-08

## 2024-11-08 LAB
ALBUMIN: 4.7
ALKALINE PHOSPHATASE: 174
ALT (SGPT): 19
AST (SGOT): 16
BILIRUBIN, DIRECT: 0.3
BILIRUBIN, TOTAL: 0.9
PROTEIN, TOTAL: 7.8

## 2024-11-08 NOTE — HPI-TODAY'S VISIT:
Dear Chanda Sarmiento,  Nov 7 labs total protein 7.8 and albumin 4.7 and tb 0.9 and direct bili 0.3 and alk 174 and ast 16 and alt 19. So labs still dropping on ast and alt but alk phos little up.  Oct 21 labs alk 161 and ast 21 and alt 19.   Sept 5 alk phos  260 and ast 36 and alt 53.  What meds are you on now ? Please let me know.   Dr Issa

## 2024-11-12 ENCOUNTER — TELEPHONE ENCOUNTER (OUTPATIENT)
Dept: URBAN - METROPOLITAN AREA CLINIC 86 | Facility: CLINIC | Age: 49
End: 2024-11-12

## 2024-11-12 NOTE — HPI-TODAY'S VISIT:
Dear Chanda Sarmiento, Nov 7 wbc 16.6 (wbc 7.3 pre and maybe recent steroid role)  and hg 16.6 elevated (prior hg 14.8) and hct 49.3 and mcv 93. Platelets 464 elevated and prior 342. Neutrophils 13.9 little up and maybe from steroids. Lymphs 2.1. Glucose 97 and bun 11 and cr 0.82 and na 141 and k 4.4 and cl 100 and co2 24.  Ca 11.4 elevated and prior 10.3 also little up. Need to see what is driving it. Total protein 8.3 and alb 5.0.   TB 1.0 and alk 191 (this went up some)  and ast 19 and alt 20 (these are stable.) Chol 171 and trg 147 and hdl 65 and ldl 81. A1c 5.8.Free t4 1.27 and tsh 1.520. Vitamin d 34 normal.   UA 3.2 Prior Oct 21: alk 161 and ast 21 and alt 19 prior.   Any new other meds?   Dr Issa

## 2024-11-14 ENCOUNTER — WEB ENCOUNTER (OUTPATIENT)
Dept: URBAN - METROPOLITAN AREA CLINIC 86 | Facility: CLINIC | Age: 49
End: 2024-11-14

## 2024-11-18 ENCOUNTER — LAB OUTSIDE AN ENCOUNTER (OUTPATIENT)
Dept: URBAN - METROPOLITAN AREA TELEHEALTH 2 | Facility: TELEHEALTH | Age: 49
End: 2024-11-18

## 2024-12-02 ENCOUNTER — LAB OUTSIDE AN ENCOUNTER (OUTPATIENT)
Dept: URBAN - METROPOLITAN AREA TELEHEALTH 2 | Facility: TELEHEALTH | Age: 49
End: 2024-12-02

## 2024-12-11 ENCOUNTER — WEB ENCOUNTER (OUTPATIENT)
Dept: URBAN - METROPOLITAN AREA CLINIC 86 | Facility: CLINIC | Age: 49
End: 2024-12-11

## 2024-12-15 ENCOUNTER — TELEPHONE ENCOUNTER (OUTPATIENT)
Dept: URBAN - METROPOLITAN AREA CLINIC 86 | Facility: CLINIC | Age: 49
End: 2024-12-15

## 2024-12-15 NOTE — HPI-TODAY'S VISIT:
Dear Chanda Sarmiento,  Another set of liver labs sent in for November 7 were sent in showing an alk phos elevated at 174 AST of 16 ALT of 19 and a bilirubin 0.9.  There was also another labs from November 7 labs that I sent prior to you a note back in November 12 as well.    Those labs and these would suggest that we need to fractionate the alk phos to see if it is coming mainly from liver or bone or intestine source.  Dr Issa

## 2025-01-16 ENCOUNTER — OFFICE VISIT (OUTPATIENT)
Dept: URBAN - METROPOLITAN AREA TELEHEALTH 2 | Facility: TELEHEALTH | Age: 50
End: 2025-01-16

## 2025-01-16 VITALS — BODY MASS INDEX: 20.61 KG/M2 | WEIGHT: 112 LBS | HEIGHT: 62 IN

## 2025-01-16 RX ORDER — LISDEXAMFETAMINE DIMESYLATE 60 MG/1
1 TABLET IN THE MORNING TABLET, CHEWABLE ORAL ONCE A DAY
Status: ACTIVE | COMMUNITY

## 2025-01-16 RX ORDER — DIAZEPAM 5 MG/1
1 TABLET AS NEEDED TABLET ORAL ONCE A DAY
Status: ACTIVE | COMMUNITY

## 2025-01-16 RX ORDER — URSODIOL 300 MG/1
1 CAPSULE CAPSULE ORAL
Qty: 180 | Refills: 1

## 2025-01-16 RX ORDER — VORTIOXETINE 20 MG/1
1 TABLET TABLET, FILM COATED ORAL ONCE A DAY
Status: ACTIVE | COMMUNITY

## 2025-01-16 RX ORDER — ATORVASTATIN CALCIUM 20 MG/1
1 TABLET TABLET, FILM COATED ORAL ONCE A DAY
Status: ACTIVE | COMMUNITY

## 2025-01-16 RX ORDER — BUDESONIDE, GLYCOPYRROLATE, AND FORMOTEROL FUMARATE 160; 9; 4.8 UG/1; UG/1; UG/1
2 PUFFS AEROSOL, METERED RESPIRATORY (INHALATION) TWICE A DAY
Status: ACTIVE | COMMUNITY

## 2025-01-16 RX ORDER — URSODIOL 300 MG/1
1 CAPSULE CAPSULE ORAL
Qty: 180 | Refills: 1 | Status: ACTIVE | COMMUNITY

## 2025-01-16 RX ORDER — ZIPRASIDONE HYDROCHLORIDE 60 MG/1
1 CAPSULE WITH FOOD CAPSULE ORAL
Status: ACTIVE | COMMUNITY

## 2025-01-16 RX ORDER — VARENICLINE 0.03 MG/.05ML
1 SPRAY IN EACH NOSTRIL SPRAY NASAL TWICE A DAY
Status: ACTIVE | COMMUNITY

## 2025-01-16 NOTE — HPI-TODAY'S VISIT:
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  Dear Chanda Sarmiento,  Another set of liver labs sent in for November 7 were sent in showing an alk phos elevated at 174 AST of 16 ALT of 19 and a bilirubin 0.9.  There was also another labs from November 7 labs that I sent prior to you a note back in November 12 as well.   Those labs and these would suggest that we need to fractionate the alk phos to see if it is coming mainly from liver or bone or intestine source.  Dr Luis Manuel Sarmiento,  Nov 7 wbc 16.6 (wbc 7.3 pre and maybe recent steroid role) and hg 16.6 elevated (prior hg 14.8) and hct 49.3 and mcv 93. Platelets 464 elevated and prior 342.  Neutrophils 13.9 little up and maybe from steroids.  Lymphs 2.1.  Glucose 97 and bun 11 and cr 0.82 and na 141 and k 4.4 and cl 100 and co2 24.  Ca 11.4 elevated and prior 10.3 also little up. Need to see what is driving it.  Total protein 8.3 and alb 5.0.    TB 1.0 and alk 191 (this went up some) and ast 19 and alt 20 (these are stable.)  Chol 171 and trg 147 and hdl 65 and ldl 81.  A1c 5.8.Free t4 1.27 and tsh 1.520.  Vitamin d 34 normal.    UA 3.2  Prior Oct 21: alk 161 and ast 21 and alt 19 prior.   Any new other meds?   Dr Luis Manuel Sarmiento,  Nov 7 labs total protein 7.8 and albumin 4.7 and tb 0.9 and direct bili 0.3 and alk 174 and ast 16 and alt 19. So labs still dropping on ast and alt but alk phos little up.  Oct 21 labs alk 161 and ast 21 and alt 19.  Sept 5 alk phos 260 and ast 36 and alt 53.  What meds are you on now ? Please let me know.   Dr Luis Manuel Sarmiento,  October 21 labs showed WBC 7.3 hemoglobin 14.8 platelet count 342 MCV 95 and neutrophils and lymphocytes normal at 4.2 and 2.1 respectively.  Glucose normal at 89 and BUN of 9 creatinine 0.68 sodium 144 potassium 4.7.  Chloride a little bit up to 107 and calcium level 10.3. Albumin on the vitamin D or calcium supplements lately? Albumin 4.5 and bilirubin normal at 1.0. The alk phos that was elevated back in May at 212 with the AST of 48 and ALT of 68 has improved now with the alk phos down to 161 and AST of 21 and ALT of 19.    The July 26 labs that showed an alk phos of 167 AST 25 and ALT of 31 so these labs are definitely a little lower than those but not back to baseline yet.    If the alk phos is not normalized  soon we can talk about fractionating it to see if it is elevated due to bone or liver or other source.  Dr. Issa  She is working for [employer]. Visiting family in [facility]. Oct 20 2024.  The September 5 labs sent the bilirubin was 1.0, alkaline phosphatase 260, AST 36, ALT 53. Previously back in July the alkaline phosphatase was 167 and the AST was 25 and the ALT was 31. These are higher.   No new meds. Need to redo when back 10-21.  Asked what was newer med or change and she says eye drops.  Aug 20 u.s liver was homogeneous in echotexture and common bile duct measures 2mm. Gallbladder views show post gb surgery status. No ascites. Right kidney 10cm and no hydronephrosis. Vessels open for liver and spleen and spleen normal at 7cm. They overall felt u.s was normal re parenchymal exam and echotexture and patent vessels and great to see as means liver is doing well and no obvious cumulative damage.   July 26 total protein 70 and alb 4.3 and tb 0.5 and direct bili 0.15 and alk 167 elevated and lower from 184. Ast 25 and alt 31 and prior ast 37 and alt 70.  June 25 labs show total protein 7.5, albumin 4.7, bilirubin 0.8 and direct bilirubin 0.2 and these are all normal. Your alk phos is coming down to 184 from 202 AST about the same at 37 from 25 and ALT went up to 70 from 60.  She says she had the doxepin that was removed 2-3 m and switched to valium and after the valium took temazepam and just stopped and back to valium.  May 23 labs show total protein 7.0 and alb 4.3 and tb 0.8 and direct 0.23 and alk 202 and prior 150 on their listed comparison but see below for more recent one. Ast 35 and alt 60.  Earlier May 2024 labs alk 212 and ast 48 and alt 68 and so  ast and alt seem to be lower now and also see a minimal drop seen in alk phos to 202.  May 2: Glucose 109 and elevated and not clear if fasting. Bun 14 and cr 0.77 and na 137 and k 3.9 and cl 103 and co2 21 and ca 9.8 and alb 4.2 and Tb 0.8 and alk 212 and prior 181 and so higher. Ast 48 and alt 68 also up and prior ast 18 and alt 28 so suggests a new medicine cause. Wbc 9.4 and hg 14.4 and hct 44 and mcv 95 and platelets 366. Neutrophils 5.6 and lymphs 2.6.   January 5, 2024 ultrasound finally was routed to your chart. Part of the problem was the name change (they has [place] listed) and  I think that threw them off even though the date of birth is the same. Pancreas was unremarkable. Liver was homogeneous/even in echotexture and with no discrete lesions. Liver vessels were patent as expected. Common bile duct was normal at 3 mm. Right kidney was 10.2 cm with no hydronephrosis. Spleen normal at 7.5 cm. Splenic vessels patent.  January 2 labs show WBC normal at 8.8 hemoglobin 15.2 platelet count 376 MCV 94. Neutrophils and lymphocytes both normal. Glucose slightly low at 66 with normal for that lab being 70-99. BUN of 10 creatinine 0.78 sodium 143 potassium 4.3 calcium 10.2 albumin 4.6 bilirubin 0.5 and only your alkaline phosphatase was elevated at 181. AST was normal at 18 and ALT was normal at 28. Prior labs that you did in September 18 had shown your alk 172  AST 23 and ALT 35 so these appear to be lower now than then. Unclear why you are alk phos is isolated up and we may want to talk about fractionating your alkaline phosphatase. It is still possible that a medication could be causing that to be off as well so lets go over your medicine list at the upcoming visit and review these options with you.  Trintellix started in Dec and remains on it.  Oct 20 2023 glu 129 and bun 9 and cr 0.9 and na 140 and k 5.0 and cl 101 and co2 23 and ca 10.3 and alb 4.6 and tb 0.9 and alk 149 and ast 23 and alt 39.   September 18 sent in. Glucose was slightly up at 102 BUN of 14 creatinine 0.86 sodium 140 potassium 4.7 calcium 10.2 albumin 4.4 bilirubin 0.4 alk phos elevated at 172 and back in September 11 of been 167. Your AST was higher at 23 and ALT higher at 35 and previous in September 11 your AST was 13 and ALT of 17. Sed rate was noted to be 2 and C-reactive protein was 1 and PTH was 33. September 11 labs show WBC 7.7 hemoglobin elevated 16.7 plate count 423 MCV 93 with a BUN of 9 creatinine 0.96 sodium 142 potassium 5.2 elevated calcium of 10.6. AST then again 13 and ALT of 17 alk phos 167. Cholesterol was up at 270 and triglycerides were up at 378 and HDL 48 and  so those were definitely off. INR normal at 1.0. B12 normal at 637 and folate normal at 9.9. Hemoglobin A1c slightly up at 5.8 so you need to watch that as you may be moving into prediabetes. TSH 1.67. Uric acid was 3.2. For comparison back in February your AST was 25 and ALT 29 alk phos 161. Very important to see what are you doing differently between September 11 and the September 18 labs.  Full labs from July 22 sent in. You read me the essential ones but the full labs showed glucose 72 BUN of 8 creatinine 0.64 sodium 137 potassium 4.2 chloride 106 CO2 of 22 BUN 4.4 bilirubin 0.7 alk phos elevated at 172 AST 24 and ALT 20. Back in May your alk phos of been 157 so that appears to be a little higher. TSH 0.76. WBC 7.1, hemoglobin up at 16.6 hematocrit up at 50.8 plate count 436 elevated MCV 99. Neutrophils 3479 lymphocytes 2918. Hep B core total negative. RPR negative. HIV test negative. Hep B surface antigen negative. Hep C antibody negative. July 22 drug screen had been positive for alcohol metabolites, amphetamine, and marijuana.  She is 9 m without any alcohol.  August 30 ultrasound shows liver echogenicity to be normal with no focal lesions. Common bile duct was normal at 3 mm. Prior cholecystectomy changes were seen. No ascites was seen. Visualized pancreas portions appeared unremarkable. Liver vessels were patent. Spleen was normal at 7.4 cm. Right kidney 10.2 cm with no hydronephrosis. In summary you have a normal-appearing liver and patent vessels.  Prior cholecystectomy changes were seen.  You mentioned she justy left rehab x 28 days and she says sent to [facility].  July 22: last ones: ast 24 and alt 20 and alk 172 up and hg 16.6 cr 0.64. She [facility] to [facility].  She has been off the alcohol now 41 day.  Labs may be better now as further than the last labs.  Pt last visit had beenm having some alcohol but she has cut back some and she is following with therapy and having 8 ounces and day and if have more than 2-3 drinks a day at risk for cirrhosis,  May 11 2023 glu 70 and and bun 11 and cr 0.68 and na 136 and k 4 and cl 99 and co2 26 and ca 10.3 and alb 4.6 and tb 1.1 and alk 157 and ast 34 and alt 60 and mg 2.1 and tsh 1.10 and wbc 9.3 and hg 15.9 and hct 45.5 and platelet 362, mcv 93.4  neutrophils 6371 and lymphs 2018.[id number] and folate 14 vit d 78 and ethyl alcohol and over [id number].  Re meds: none in rehab and stratera 60mg qd and 40mg escitalopram and geodon 60mg po qd.  February 6 showed that the pancreas was unremarkable, the liver was homogeneous/even in echotexture with no discrete lesions. Liver vessels were patent. Common bile duct was normal at 2 mm.  Right kidney was 10.8 cm with no hydronephrosis. Spleen was normal at 7.9 cm with splenic vessels patent.  Pt says she was drinking more in 2020 and not until this past year. 6m of this,  Smoking 1/2 ppd and need to work on this.  She says mother is doing study with [place]. Doing a clinical study for her lungs and for chronic bronchitis.  She says she had labs done last week. Rheum saw.  2-7-2023 and wbc 8.6 hg 15 and plat 387 and neutrophils 4.9 and lymph 2.7 and gly 89 and bun 11 and cr 0.85 and na 140 and k 4.2 and cl 99 and co2 24 and alb 4.7 and tb 0.9 and alk 161 and ast 25 and alt 29.  Alcohol could not sense with the vyvance.  She was changed from adderall to vyvanse and maybe for 3m. She is on ursodiol 300mg po tid.  9/14/22 labs were sent to us. White blood cell count 10.8, hemoglobin 13.2, MCV 95, platelets 337, lymphocytes 2.3, neutrophils 7.3 which is slightly elevated please make sure your primary care doctor is aware. Glucose 98, creatinine 0.65, sodium 142, potassium 4.1, bilirubin 0.5, alkaline phosphatase 146, AST 17, ALT 35. The alkaline phosphatase and the AST are trending in the right direction. Slight increase in the alt, previousl it was 31. We will continue to monitor this. Also of note the protein was slightly low at 5.9 , please share with your primary care.  Aug 5: glu 93 and bun 6 and cr 0.92 and na 141 and k 4.3 and cl 102 and co2 23. Wbc 10.4 and hg 14.6 and hct 44.3 and plat 404.  Mcv 95. Neutrophils 7.3 little up and prior normal 6.8. Lymphs 2.1. Alb 4.4 and tb 0.8 and db 0.22 and alk 150 ast 27 and alt 23.  Prior June 27: alk 183 and ast 18 and alt 31. So labs trending right way with some variation in ast and alt but still in normal range for ast and alt. No clear acute issues.  We will contact kesha for the u.s done feb 3.  Aug 5  ultrasound shows liver normal in contour and echogenicity.   No suspicious liver lesions. Common bile duct was normal at 2.4 mm and gallbladder absent. Right kidney 9.6 cm with no hydronephrosis. Spleen normal at 8.6 cm. Imaged pancreas portions unremarkable with tail partially not well seen due to gas. Liver vessels patent. Overall good to see that there was no focal lesions and no suspicion for any fibrotic morphology.  She started to drink again and she is having like tequila and has 1 ounce and doing 6 ounces on a rare day and 3 ounces a day. Started 1 month ago.  Pt says labs in Jan 2022 were up and she was ordered a medicine that had diclofenac and Geodon in Jan. She is stil on the geodon at 40mg.  June 27 labs show glucose normal at 86 BUN of 10 creatinine 0.93 down from 0.94.  Sodium 141 potassium 3.9 chloride 103 CO2 24. White blood count still up at 11 but down from 13.5 with.  Hemoglobin 13.2 which is stable.  Platelet count normal at 363 from 411.  MCV normal at 92.  Neutrophils normal at 6.8 and lymphocytes 3.0. Albumin 4.1 bilirubin 1.1 direct bilirubin 0.3.  Alk phos down to 183 from 213.  AST down to 18 and ALT down to 31 from previous AST 17 and ALT 35.  Ideal ALT is less than 25.   June 8 labs showed glucose elevated at 123 and previously was normal but has occasionally been elevated as back in May 3 it was 108.  Please share with primary provider.  Creatinine down to 0.94 from 1.15 so doing better there.  Sodium 138 potassium 4.2 chloride 103 CO2 of 22. White blood  cell count up at 13.5 from 10.3.  Were you ill recently?  Hemoglobin 13.2 platelet count down to normal at 411 from 452.  Neutrophils up at 10.8 and again wonder if he had a recent infection or vaccine?  Lymphocytes 2.1 normal. Albumin 4.5 bilirubin 0.6 which is stable direct bilirubin 0.2.  Alkaline phosphatase slightly lowered to 13 from 220 AST low at 17 from 21 previously 38.  ALT still slightly up at 35 but previously 35 and back on May 3 have been 65. Overall liver labs are dropping but slowing down on that. Any new meds? Maybe role of recent illness?  May 19 labs show glucose down to 97 from 108 before.  BUN of 10.  Creatinine still remains slightly up at 1.15 from 1.13.  He to stay hydrated with the increasing temperatures. Sodium 135 potassium 4.7 chloride 96 CO2 of 21. White blood cell count 10.3 hemoglobin 14.1 platelet count slightly up at 452 and was previously 430.  MCV 92.  Neutrophils 6.4 lymphocytes 2.7 normal. Albumin 4.6 bilirubin 0.6 which is down from 1.0 and prior 1.4.  Direct bilirubin 0.21.  Alk phos remains at 220 was previously 220 and prior 180.  AST down to 21 and previously 38.  ALT down to 35 from previously 65.  May 3 labs show glucose slightly up at 108 and may be not fasting that day?  BUN of 12 creatinine elevated at 1.13 previously 1.06.  Sodium 139 potassium 4.5 chloride 101 CO2 21.  The chloride and CO2 are back to normal which they were off last time. White blood cell count down to 10 hemoglobin 14.8 platelet count 430 MCV 90 neutrophils normal at 6.5 lymphocytes normal now at 2.5. Albumin 4.7 also down from 5.0.  Bilirubin normal now at 1.0 alkaline phosphatase still elevated though at 220 previously was 180.  AST was 38 and ALT 65.  Previously AST 27 and ALT 44. Called pt and no new meds. Did drop citalopram to 10mg po qd and she is now off. Soumya 300mg po tid for 2 weeks or so. Asked if gaining weight and she is not. Smoking a lot of cigarettes and I have seen that before but not common to see. She will work on this and see how she does. She will try to work on that. Redo the labs in 2 weeks.  She stopped her lisinopril and changed to amlodipine and did not start as bp is low. 95/71.  January 19, 2022 ultrasound sent in. Liver appeared to be increased abd coarsened in regards to echogenicity.  No definite liver lesions.  No dilated ducts. Normal hepatopetal flow seen in the main portal vein.  Imaged hepatic veins also were patent. Common bile duct normal at 5 mm. Gallbladder absent. Right kidney 10.6 cm with no hydronephrosis. Left kidney 10.5 cm with no hydronephrosis.  Spleen normal at 8.7 cm. They suspected that the liver could have moderate diffuse fatty infiltration.  I would state that this was also at the time that you are having a medication effect so it could be that threw that reading off.     April 4, 2022 labs show glucose 93 which is normal BUN of 9 creatinine slightly up at 1.05 and was previously 1.01. May want to look at your hydrational state. Sodium 139 potassium 4.7 chloride 99 CO2 of 22. Albumin 4.8 bilirubin 1.2 which is normal and direct bilirubin 0.28 suggesting that you have Gilbert's syndrome or that indirect hyperbilirubinemia that is benign and present about 10% of people. Alkaline phosphatase down to 187 from 202 and down previously from 230.  AST of 24 down from 36 and prior 49. ALT 33 down from 41 and ALT 76.  She had a prior bump in lfts from working on epoxys.  Making cups.  She also asked re asa and crestor and  she has not started the aspirin yet and she was to start to crestor and she will start post the next labs.  March 21 labs show glucose still slightly up at 104 previously 106.  BUN of 7 and creatinine down to 1.01 from 1.19 so that was better.  Sodium 136 potassium 4.2 chloride 101 CO2 slightly low at 18. Total bilirubin normal at 1.1 previously 1.2 direct bilirubin 0.29 and I would suggest that you have Gilbert's.  Gilbert's as you recall is that indirect hyperbilirubinemia that is very common and present about 10% of people. The alkaline phosphatase is down to 202 from 230 and the AST is down to 36 from 49 and ALT 41 from 76.  It would appear that you change something? White blood cell count 9.7 hemoglobin 14.1 platelet count 444 down from 499. Important to note the white cell count also came down from 13.4 to 9.7.  Neutrophils 6.8 and lymphocytes 2.1 both normal. Called pt re the labs. She says did not change meds. 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   March 9 labs show white blood cell count 13.4 up from 11.7 and prior 10.7.  Hemoglobin 14.6.  Platelet count up a little at 499 from 445.  It has been variable before though.  MCV normal at 90.  Neutrophils up at 9.5 and lymphocytes 2.5 down from last time. Glucose 106 slightly up and BUN of 9 creatinine slightly higher at 1.9 and previously 1.15.   Sodium 137 potassium 4.1 chloride 100 CO2 slightly low at 19.  Albumin 4.0.  Total bilirubin 1.2 normal.  Direct bilirubin 0.35.  Alkaline phosphatase up to 233.  AST 49 and ALT 76 and previously AST 29 ALT 40. Called pt as labs trending up. Meds: propanol, furosemide, spirinolactone, basagalar, Spiriva and Symbicort, Geodon and lamotrigine. Ursodiol. Lamotrigine 1% risk for incl fts and on for a year. Geodon: category c for liver injury and occurs within 1-4 weeks and she has been on it for 6-8 weeks. She will discuss with psych re this.   March 3 labs show liver alkaline phosphatase fraction is 76% bone fraction is 24% and intestinal 0%  so clearly the alkaline phosphatase is coming mainly from liver.  This is the normal pattern that one would expect. March 3 labs also show albumin slightly up at 4.9 from 4.7.  Could reflect hydrational status. Total bilirubin normal at 1.2 and direct bilirubin 0.32 suggesting a Gilbert's syndrome which is a benign finding. Alkaline phosphatase remains elevated at 203 and previously was 175.  AST 29 and ALT 40 and previously AST 20 and ALT 34. Unfortunately these labs arrived very late so we need to go over with you what medicines you are on and since when.  They are not dramatically worse but they are definitely drifting up.  February 24 labs show white blood cell count elevated at 11.7 previously was 10.7.  Hemoglobin 14.5 normal platelet count 445 down from 468.  MCV 92.  Neutrophils elevated at 7.1 and lymphocytes 3.4.  Have you been ill?  Glucose 107 BUN of 12 creatinine 1.15 down from 1.26.  Normal is from 0.57 up to 1.0 so still elevated.  Sodium 137 k 3.8 chloride 98 CO2 of 22.  Albumin 4.7 normal now.  Limit 0.8 direct bilirubin 0.18 and alkaline phosphatase elevated at 175.  Previously 107.  AST 20 and ALT 34.  Previously AST 26 and ALT 30.  Need to watch this evolving alkaline phosphatase trend. Called pt: she had surgery on the right elbow and so this could be from bone and had moved ulnar nerve. She also is on some prednisone and she is on prednisone 2.5mg po qd. We need to fractionate the alkaline phosphatase. We can redo the labs next week to check on that.  February 2 labs show white blood cell count 10.7 hemoglobin 14.4 platelet count up at 468.  MCV was 90 which is normal.  Neutrophils 6.3 and lymphocytes elevated at 3.3. Glucose slightly up at 102 BUN of 13 creatinine elevated at 1.26 sodium 139 potassium 3.9 chloride 99 CO2 of 22 albumin elevated at 4.9 bilirubin 0.8 direct bilirubin 0.21 alkaline phosphatase 107 AST 26 and ALT 30 so clearly that must have been that herbal smooth product that you were on and the other herbals and we can reassess from there. Called pt: she had ketorolac also. Start the ursodiol 300mg po bid with food. She will do that and follow labs.  Started atorvastatin since dec 2020 and she is still on that for now and then to do the crestor,  January 4 local labs sent in. Glucose 159 elevated please share with primary provider.  He had a 16 creatinine 0.96 sodium 138 potassium 4.2 chloride 99 calcium 9.9 total bili 0.4 alkaline phosphatase 202 and . Prior December 29 labs showed alk phos 114 AST 28 and ALT 45 so clearly those numbers bumped. White blood cell count 8.4 hemoglobin 13.2 platelet count 350. Cholesterol 226 elevated.  Triglyceride 284 elevated.   elevated.  Hemoglobin A1c slightly better at 7.9 from 8.1%.  TSH 1.78.  Magnesium 2.2. Called pt: need new labs to see what labs doing.  Dec 29 labs: ast 48 and alt 31 and alk 111 and tb 0.4.  December 13 2021  labs show white blood  cell count elevated 11.9 but down from 12.6.  Hemoglobin 15 and platelets up at 503 and previously 394.  That can sometimes go up with inflammation or medicines.  Please be sure to share this info with primary providers. MCV normal at 90.  Neutrophils elevated 7.4 but down from 10.1 back on December 2.  Lymphocytes normal at 3.1 and monocytes slightly up at 1.0. Glucose elevated at 149 and was previously 114.  Hemoglobin A1c up to 8.1 and was previously 6.3.  You should look at this sugar issue again with your primary provider as that is unusual for you. BUN of 12 creatinine 1.19 which is up from 1.014 sodium 136 potassium 4.2 chloride 98 CO2 20. Calcium at 10.4 and was previously normal at 9.6 and I wonder if it is medicine related? Albumin 4.6 bilirubin 0.8 alkaline phosphatase slightly higher at 130 previously 126. AST down to 32 and ALT down to 49 previously AST 65 and ALT 66. C-reactive protein noted to be normal at 2.  Dec 1 labs much better: Tp 6.9 and alb 4.2 and tb 0.2 and db 0.10 and alk 111 and ast 19 and alt 43 and down from prior alk 301 and ast 77 and alt 171. So doing much better. Keep track of any new meds added or removed.  November 98 labs show 123 which is elevated BUN of 18 creatinine 0.99 sodium 137 potassium 5.1 chloride 99 CO2 of 24 albumin 4.7 bilirubin 0.5 alkaline phosphatase down to 118 from 194.  AST 29 and previously 21 ALT down to 46 from 53.  So clearly centimeters drop. She says she had increased the Seroquel and then being weaned on it now. She is now on 50 mg of Seroquel and she is to stop it tonight and she will redo labs in 1-2 weeks to be sure.  Ca 10.3 little up and she is not on supplements for this and follow course. She will let primary md know.  Oct 12: alb 4.5 and tb 0.6 and db 0.19 and alk 301 and ast 77 and alt 171 and prior ast 33 and alt 52. went to 25 and 50 and now 100mg.  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 Says taking dilaudid and ibuprofen for that. She says taking a dilaudid occ and one ibuprofen. 10-1 labs also off. Still on steroids 10mg po qd for the rheum arthritis so enbrel stopped working and went to this. She did myasthenia screen and neg and started on this and trying to do humira sunday.   10-1 alk 199 and ast 56 and alt 149.   September 21 ultrasound showed the liver to be 15.6 cm with mild hepatic steatosis but no focal mass. Liver vessels are noted to be patent. Prior cholecystectomy changes seen. No bile duct dilation seen with common bile duct 5 mm. Visualized pancreas portions were unremarkable proportions of the head and tail the pancreas were not seen due to gas. Right kidney 10.6 cm and left kidney 10.8 cm with no hydronephrosis. Spleen normal at 8.5 cm. Mild liver fat overall was seen patent vessels were seen. No ascites was seen.  September 21 labs show albumin 4.9 which remains slightly up bilirubin 0.4 which is down from 0.8 direct bilirubin 0.13 which is down from 0.23. Alkaline phosphatase down to 198 from 207 AST slightly up at 33 from 28 ALT 52 down from 66.  September 9 redo labs show albumin stable 4.9 bilirubin 0.8 direct 0.23 alkaline phosphatase slightly lower at 207 from 215. AST down to 28 from 61. ALT down to 66 from 86.  So it does appear the labs are dropping with lamictal drop 150 to 100mg and then went up on seroquel. Sept 7 labs: alb 4.8 and tb 0.7 and db 0.2 and alk 215 and Ast 61 and alt 86.  Prior aug 27 albs ast 18 and alt 28 and alk 135. that was pre lamictal changed.  She had prior drinking green tea for a few weeks. She has an immune issue and so the labs spiked up. then stopped and labs settled last year off that.  Pt was to do a bx and trouble breathing and Dr Self told to do barium swallow and manometry and did the barium and to do the manometry and says told was ok.  Ent saw some plaques and she had bx done.  Pelvic area pain noted and se started with pain and went to er and went [facility] and she said cyst in fallopian tube and ruptured and did a full hysterectomy and that was July 28 2021.  Ex  burned her citizenship certificate. She said she applied to get it. [phone] for that.  Enbrel started earlier for seronegative RA.  She says needed onc re wbc up and asked him to do the lfts and she says part of labs back and she has from aug 8 2021. Ast and alt done and alk 209 and prior 138 and ast 149 and alt prior 34 and alt 231 and 76 july 19. b12 1804 and IGA 68 little low.  May 3:wbc 9.6 hg 13.7 and plat 355 and mcv 93. Neutrophils 6.5. glu 92 and cr 0.95 and na 137 and k 4.3 and ca 9.8 and alb 4.5 and tb 0.3 and alk 177 elevated and ast 30 and alt 70 and tesosterone level 6 normal per chart for your age. Prior alk 277 and ast 126 and alt 315. So came down post that doxycycline exposure.  April 6, 2021 [facility] at [facility]: pancreas appears normal where seen and liver demonstrates normal size and contour and echogenicity. Liver vessels patent. Gallbladder absent. Common bile duct 4mm. No hydronephrosis seen to kidneys. Spleen 8.4cm. No liver mass seen.  Jan 11: glu 171 elevated and show local md. cr 1.06 and na 137 and k 4.0 and cl 98 and co2 21 and ca 9.7 normal and alb 4.6 and tb 0.7 and alk 88 and ast 13 and alt 16. So liver labs doing very well. A1c 5.9% noted.  Jan 20 2021: ct no cardiomegaly. Clear lung bases. Unremarkable liver. Prior gb surgery changes. Unremarkable spleen. Pancreas and and adrenal glands normal. cyst right kidney. left renal cyst. Mild rectosigmoid wall thickening. Liver vessels patent. No bulk adenopath.  Nov 9 2020 labs: wbc 13. 4hg 14 and plat 453 elevated slightly (150- 450 normal). na 135 k 4.6 and cl 97 and co2 26 and calcium elevated 11.0.  Show to local md: why is calcium up?alb 4.5 and tb 0.6 and alk 165 elevated and ast 22 and alt 36 so back down. chol 336 elevated and trg 307 and ldl 219. Lipids off and not sure if due to recent liver flare or other and may want to redo that as known if liver flared that this can be off. Vit d low 28.8. a1c 5.9%.  She says has had low vit d and she was to start this and she was started on 5000 a day for 4 m and she had been taking some vit d. She says she had stopped it for surgery July and restarted it again.  She says was on doxycycline and said had a prior hx of tcn reaction and filled and she said she may have been more ill from it. No reexposure.  Nov 3 2020 : ast 50 and alt 121 (both down then from 121 and 222). main she is off green tea and the lamictal also off prior. Suspect green tea was immune stimulant reving up your immune system. She has a very active immune system.  Rheum says she has fibromyalgia and doing water therapy.  11-2-20: ast down to 50 and alt 118 and prior 121 and alt 222 . alk 213 still up from 210 and tb down to 0.4 from 0.5.  Oct 26 labs tb 0.5 and alk 210 and ast 121 and alt 222. alk 248 and so now 210 so that is better, and ast 128 and that is now 121 and alt was 248 and now 222 so better and we need to follow.  Oct 19 labs spiked again: alk 198 and ast 128 and alt 248. Tb 0.5. na 136 and k k 4.5. bun 15 and cr 0.94 and glu 112.   Oct 6 ast 16 and alt 25 and alk 111. Prior she was also on abx for 6 weeks and so finished oct 8 or so and so that not the cause.  Oct 6: wbc 11.2 little up, hg 14.4 and hct 42.2, plat 141. Mcv 90, glu 103 little up and maybe not fasting. Bun 20 and cr 0.98 and na 135 and k 4.5, cl 99 and co2 22. Alb 4.2 and tb 0.5 and alk 111 and ast 16 and alt 25.  Prior visit lamictal came down from 75/50 to 50/25 as of oct 9 and added gabapentin 400mg and seroquel 25mg po qhs an atorvastatin 40mg po qhs.  Off the prior nasal exposures also.  Aunt Ewelina passed in oct 2020 had oltx and she had failed from 2nd tx. Not from covid 19.  [facility]s aug 2020 [facility]: they saw mild fatty infiltration of the liver. Prior gb surgery changes seen. Visualized pancreas portions normal. No liver masses. Common duct 4mm. Portal vein patent. Kidneys unremarkable. Spleen 7.8cm unremarkable. Should get better as time passes from the issues we discussed.  Aug 4 2020 labs: glu 61 and little low bun 17 and cr 0.91 and na 135 and k 5.2 and cl 96 and co2 20 and ca 10.9 elevated and show local md. alb 4.7 and tb 0.6 and alk 104 and ast 24 and alt 28.  She was on ursodiol for has been on it for her liver started july 29 and she did stop it . She is staying off this as last time ok and may need it.  Nov 25 2020: wbc 11.3 and hg 14.1 plat 484 elevated and neutrophils 7.7 and glu 144 elevated and cr 1.08 little up.na 139 and k 4.3 and cl 99 and co2 24 and ca 10 and alb 4,7 and tb 0.3 and alk 130 and ast 15 and alt 18. Addendum: 1. Smoothie immune booster did. 2. Michelle took that. 3. She will confirm medicine.  Last time had been down 120 and now 110 in aug.   Plan: 1. We need labs when back in town 10-21. 2. She will keep track and look 7-26 to 9-5 labs and see if can recall what dose changed or med changed? Polo henriquez. 3. If thinks of med changes that could explain issues. 4. Sometimes may settle on a medicine over time.  Addendum: le for a few weeks pre labs in sept: Serum aminotransferase elevations above 3 times the upper limit of normal occurred in 2% to 4% of patients treated with cariprazine in preregistration studies compared with 0.7% to 2% of placebo recipients. Elevations above 5 times ULN were rare <1% and no patient developed clinically apparent liver injury with jaundice or symptoms. Nevertheless, an occasional patient was withdrawn from therapy because of serum aminotransferase elevations usually arising within the first month of treatment and resolving rapidly with drug discontinuation. Since its approval and more widescale use, there have been no published cases of clinically apparent liver injury although the product label lists hepatitis as a possible adverse side effect. Likelihood score: E* (unproven but suspected rare cause of clinically apparent liver injury).   Duration of visit was   min with 10 min of prep and loading multiple labs done and reviewed to ecw and then 20 min by lock from 740 to 800 am as healow clock off due to fluxes for this healow telemed with time spent reviewing chart and events with the patient and in discussing plans.

## 2025-01-28 ENCOUNTER — OFFICE VISIT (OUTPATIENT)
Dept: URBAN - METROPOLITAN AREA TELEHEALTH 2 | Facility: TELEHEALTH | Age: 50
End: 2025-01-28
Payer: COMMERCIAL

## 2025-01-28 ENCOUNTER — LAB OUTSIDE AN ENCOUNTER (OUTPATIENT)
Dept: URBAN - METROPOLITAN AREA TELEHEALTH 2 | Facility: TELEHEALTH | Age: 50
End: 2025-01-28

## 2025-01-28 VITALS — HEIGHT: 62 IN | BODY MASS INDEX: 20.61 KG/M2 | WEIGHT: 112 LBS

## 2025-01-28 DIAGNOSIS — D80.2 IGA DEFICIENCY: ICD-10-CM

## 2025-01-28 DIAGNOSIS — Z79.899 HIGH RISK MEDICATION USE: ICD-10-CM

## 2025-01-28 DIAGNOSIS — Z98.890 HISTORY OF FUNDOPLICATION: ICD-10-CM

## 2025-01-28 DIAGNOSIS — F14.11 COCAINE ABUSE IN REMISSION: ICD-10-CM

## 2025-01-28 DIAGNOSIS — R74.8 ABNORMAL LIVER ENZYMES: ICD-10-CM

## 2025-01-28 DIAGNOSIS — K63.5 POLYP OF COLON, UNSPECIFIED PART OF COLON, UNSPECIFIED TYPE: ICD-10-CM

## 2025-01-28 DIAGNOSIS — E66.3 OVERWEIGHT: ICD-10-CM

## 2025-01-28 DIAGNOSIS — Z71.89 VACCINE COUNSELING: ICD-10-CM

## 2025-01-28 DIAGNOSIS — M13.80 SERONEGATIVE ARTHRITIS: ICD-10-CM

## 2025-01-28 DIAGNOSIS — K71.9 DRUG-INDUCED LIVER DISEASE: ICD-10-CM

## 2025-01-28 DIAGNOSIS — Z71.6 TOBACCO ABUSE COUNSELING: ICD-10-CM

## 2025-01-28 DIAGNOSIS — G44.009: ICD-10-CM

## 2025-01-28 DIAGNOSIS — F10.11 ALCOHOL USE DISORDER, MILD, IN EARLY REMISSION, ABUSE: ICD-10-CM

## 2025-01-28 DIAGNOSIS — Z90.710 HISTORY OF HYSTERECTOMY: ICD-10-CM

## 2025-01-28 DIAGNOSIS — F33.1 MODERATE EPISODE OF RECURRENT MAJOR DEPRESSIVE DISORDER: ICD-10-CM

## 2025-01-28 DIAGNOSIS — F90.9 ATTENTION DEFICIT HYPERACTIVITY DISORDER (ADHD), UNSPECIFIED ADHD TYPE: ICD-10-CM

## 2025-01-28 DIAGNOSIS — K75.4 AUTOIMMUNE HEPATITIS: ICD-10-CM

## 2025-01-28 DIAGNOSIS — F19.90 ALCOHOL USE DISORDER: ICD-10-CM

## 2025-01-28 PROCEDURE — 99214 OFFICE O/P EST MOD 30 MIN: CPT | Performed by: PHYSICIAN ASSISTANT

## 2025-01-28 RX ORDER — DIAZEPAM 5 MG/1
1 TABLET AS NEEDED TABLET ORAL ONCE A DAY
Status: ACTIVE | COMMUNITY

## 2025-01-28 RX ORDER — VARENICLINE 0.03 MG/.05ML
1 SPRAY IN EACH NOSTRIL SPRAY NASAL TWICE A DAY
Status: ACTIVE | COMMUNITY

## 2025-01-28 RX ORDER — VORTIOXETINE 20 MG/1
1 TABLET TABLET, FILM COATED ORAL ONCE A DAY
Status: ACTIVE | COMMUNITY

## 2025-01-28 RX ORDER — ZIPRASIDONE HYDROCHLORIDE 60 MG/1
1 CAPSULE WITH FOOD CAPSULE ORAL
Status: ACTIVE | COMMUNITY

## 2025-01-28 RX ORDER — URSODIOL 300 MG/1
1 CAPSULE CAPSULE ORAL
Qty: 180 | Refills: 1 | Status: ACTIVE | COMMUNITY

## 2025-01-28 RX ORDER — LAMOTRIGINE 25 MG/1
1 TABLET TABLET ORAL
Status: ACTIVE | COMMUNITY

## 2025-01-28 RX ORDER — LISDEXAMFETAMINE DIMESYLATE 60 MG/1
1 TABLET IN THE MORNING TABLET, CHEWABLE ORAL ONCE A DAY
Status: ACTIVE | COMMUNITY

## 2025-01-28 RX ORDER — URSODIOL 300 MG/1
1 CAPSULE CAPSULE ORAL
Qty: 180 | Refills: 1

## 2025-01-28 RX ORDER — ATORVASTATIN CALCIUM 20 MG/1
1 TABLET TABLET, FILM COATED ORAL ONCE A DAY
Status: ACTIVE | COMMUNITY

## 2025-01-28 RX ORDER — BUDESONIDE, GLYCOPYRROLATE, AND FORMOTEROL FUMARATE 160; 9; 4.8 UG/1; UG/1; UG/1
2 PUFFS AEROSOL, METERED RESPIRATORY (INHALATION) TWICE A DAY
Status: ACTIVE | COMMUNITY

## 2025-01-28 NOTE — HPI-TODAY'S VISIT:
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  1/28/25 November 7 were sent in showing an alk phos elevated at 174 AST of 16 ALT of 19 and a bilirubin 0.9. There was also another labs from November 7 labs that I sent prior to you a note back in November 12 as well.  Those labs and these would suggest that we need to fractionate the alk phos to see if it is coming mainly from liver or bone or intestine source.  Nov 7 wbc 16.6 (wbc 7.3 pre and maybe recent steroid role) and hg 16.6 elevated (prior hg 14.8) and hct 49.3 and mcv 93. Platelets 464 elevated and prior 342. Neutrophils 13.9 little up and maybe from steroids.    Chol 171 and trg 147 and hdl 65 and ldl 81.  A1c 5.8.Free t4 1.27 and tsh 1.520.  Vitamin d 34 normal.  started lamictal      recap Dr Issa Dear Chanda Sarmiento,  October 21 labs showed WBC 7.3 hemoglobin 14.8 platelet count 342 MCV 95 and neutrophils and lymphocytes normal at 4.2 and 2.1 respectively.  Glucose normal at 89 and BUN of 9 creatinine 0.68 sodium 144 potassium 4.7.  Chloride a little bit up to 107 and calcium level 10.3. Albumin on the vitamin D or calcium supplements lately? Albumin 4.5 and bilirubin normal at 1.0. The alk phos that was elevated back in May at 212 with the AST of 48 and ALT of 68 has improved now with the alk phos down to 161 and AST of 21 and ALT of 19.    The July 26 labs that showed an alk phos of 167 AST 25 and ALT of 31 so these labs are definitely a little lower than those but not back to baseline yet.    If the alk phos is not normalized  soon we can talk about fractionating it to see if it is elevated due to bone or liver or other source.  Dr. Issa  She is working for [employer]. Visiting family in [facility]. Oct 20 2024.  The September 5 labs sent the bilirubin was 1.0, alkaline phosphatase 260, AST 36, ALT 53. Previously back in July the alkaline phosphatase was 167 and the AST was 25 and the ALT was 31. These are higher.   No new meds. Need to redo when back 10-21.  Asked what was newer med or change and she says eye drops.  Aug 20 u.s liver was homogeneous in echotexture and common bile duct measures 2mm. Gallbladder views show post gb surgery status. No ascites. Right kidney 10cm and no hydronephrosis. Vessels open for liver and spleen and spleen normal at 7cm. They overall felt u.s was normal re parenchymal exam and echotexture and patent vessels and great to see as means liver is doing well and no obvious cumulative damage.   July 26 total protein 70 and alb 4.3 and tb 0.5 and direct bili 0.15 and alk 167 elevated and lower from 184. Ast 25 and alt 31 and prior ast 37 and alt 70.  June 25 labs show total protein 7.5, albumin 4.7, bilirubin 0.8 and direct bilirubin 0.2 and these are all normal. Your alk phos is coming down to 184 from 202 AST about the same at 37 from 25 and ALT went up to 70 from 60.  She says she had the doxepin that was removed 2-3 m and switched to valium and after the valium took temazepam and just stopped and back to valium.  May 23 labs show total protein 7.0 and alb 4.3 and tb 0.8 and direct 0.23 and alk 202 and prior 150 on their listed comparison but see below for more recent one. Ast 35 and alt 60.  Earlier May 2024 labs alk 212 and ast 48 and alt 68 and so  ast and alt seem to be lower now and also see a minimal drop seen in alk phos to 202.  May 2: Glucose 109 and elevated and not clear if fasting. Bun 14 and cr 0.77 and na 137 and k 3.9 and cl 103 and co2 21 and ca 9.8 and alb 4.2 and Tb 0.8 and alk 212 and prior 181 and so higher. Ast 48 and alt 68 also up and prior ast 18 and alt 28 so suggests a new medicine cause. Wbc 9.4 and hg 14.4 and hct 44 and mcv 95 and platelets 366. Neutrophils 5.6 and lymphs 2.6.   January 5, 2024 ultrasound finally was routed to your chart. Part of the problem was the name change (they has [place] listed) and  I think that threw them off even though the date of birth is the same. Pancreas was unremarkable. Liver was homogeneous/even in echotexture and with no discrete lesions. Liver vessels were patent as expected. Common bile duct was normal at 3 mm. Right kidney was 10.2 cm with no hydronephrosis. Spleen normal at 7.5 cm. Splenic vessels patent.  January 2 labs show WBC normal at 8.8 hemoglobin 15.2 platelet count 376 MCV 94. Neutrophils and lymphocytes both normal. Glucose slightly low at 66 with normal for that lab being 70-99. BUN of 10 creatinine 0.78 sodium 143 potassium 4.3 calcium 10.2 albumin 4.6 bilirubin 0.5 and only your alkaline phosphatase was elevated at 181. AST was normal at 18 and ALT was normal at 28. Prior labs that you did in September 18 had shown your alk 172  AST 23 and ALT 35 so these appear to be lower now than then. Unclear why you are alk phos is isolated up and we may want to talk about fractionating your alkaline phosphatase. It is still possible that a medication could be causing that to be off as well so lets go over your medicine list at the upcoming visit and review these options with you.  Trintellix started in Dec and remains on it.  Oct 20 2023 glu 129 and bun 9 and cr 0.9 and na 140 and k 5.0 and cl 101 and co2 23 and ca 10.3 and alb 4.6 and tb 0.9 and alk 149 and ast 23 and alt 39.   September 18 sent in. Glucose was slightly up at 102 BUN of 14 creatinine 0.86 sodium 140 potassium 4.7 calcium 10.2 albumin 4.4 bilirubin 0.4 alk phos elevated at 172 and back in September 11 of been 167. Your AST was higher at 23 and ALT higher at 35 and previous in September 11 your AST was 13 and ALT of 17. Sed rate was noted to be 2 and C-reactive protein was 1 and PTH was 33. September 11 labs show WBC 7.7 hemoglobin elevated 16.7 plate count 423 MCV 93 with a BUN of 9 creatinine 0.96 sodium 142 potassium 5.2 elevated calcium of 10.6. AST then again 13 and ALT of 17 alk phos 167. Cholesterol was up at 270 and triglycerides were up at 378 and HDL 48 and  so those were definitely off. INR normal at 1.0. B12 normal at 637 and folate normal at 9.9. Hemoglobin A1c slightly up at 5.8 so you need to watch that as you may be moving into prediabetes. TSH 1.67. Uric acid was 3.2. For comparison back in February your AST was 25 and ALT 29 alk phos 161. Very important to see what are you doing differently between September 11 and the September 18 labs.  Full labs from July 22 sent in. You read me the essential ones but the full labs showed glucose 72 BUN of 8 creatinine 0.64 sodium 137 potassium 4.2 chloride 106 CO2 of 22 BUN 4.4 bilirubin 0.7 alk phos elevated at 172 AST 24 and ALT 20. Back in May your alk phos of been 157 so that appears to be a little higher. TSH 0.76. WBC 7.1, hemoglobin up at 16.6 hematocrit up at 50.8 plate count 436 elevated MCV 99. Neutrophils 3479 lymphocytes 2918. Hep B core total negative. RPR negative. HIV test negative. Hep B surface antigen negative. Hep C antibody negative. July 22 drug screen had been positive for alcohol metabolites, amphetamine, and marijuana.  She is 9 m without any alcohol.  August 30 ultrasound shows liver echogenicity to be normal with no focal lesions. Common bile duct was normal at 3 mm. Prior cholecystectomy changes were seen. No ascites was seen. Visualized pancreas portions appeared unremarkable. Liver vessels were patent. Spleen was normal at 7.4 cm. Right kidney 10.2 cm with no hydronephrosis. In summary you have a normal-appearing liver and patent vessels.  Prior cholecystectomy changes were seen.  You mentioned she justy left rehab x 28 days and she says sent to [facility].  July 22: last ones: ast 24 and alt 20 and alk 172 up and hg 16.6 cr 0.64. She [facility] to [facility].  She has been off the alcohol now 41 day.  Labs may be better now as further than the last labs.  Pt last visit had beenm having some alcohol but she has cut back some and she is following with therapy and having 8 ounces and day and if have more than 2-3 drinks a day at risk for cirrhosis,  May 11 2023 glu 70 and and bun 11 and cr 0.68 and na 136 and k 4 and cl 99 and co2 26 and ca 10.3 and alb 4.6 and tb 1.1 and alk 157 and ast 34 and alt 60 and mg 2.1 and tsh 1.10 and wbc 9.3 and hg 15.9 and hct 45.5 and platelet 362, mcv 93.4  neutrophils 6371 and lymphs 2018.[id number] and folate 14 vit d 78 and ethyl alcohol and over [id number].  Re meds: none in rehab and stratera 60mg qd and 40mg escitalopram and geodon 60mg po qd.  February 6 showed that the pancreas was unremarkable, the liver was homogeneous/even in echotexture with no discrete lesions. Liver vessels were patent. Common bile duct was normal at 2 mm.  Right kidney was 10.8 cm with no hydronephrosis. Spleen was normal at 7.9 cm with splenic vessels patent.  Pt says she was drinking more in 2020 and not until this past year. 6m of this,  Smoking 1/2 ppd and need to work on this.  She says mother is doing study with [facility]. Doing a clinical study for her lungs and for chronic bronchitis.  She says she had labs done last week. Rheum saw.  2-7-2023 and wbc 8.6 hg 15 and plat 387 and neutrophils 4.9 and lymph 2.7 and gly 89 and bun 11 and cr 0.85 and na 140 and k 4.2 and cl 99 and co2 24 and alb 4.7 and tb 0.9 and alk 161 and ast 25 and alt 29.  Alcohol could not sense with the vyvance.  She was changed from adderall to vyvanse and maybe for 3m. She is on ursodiol 300mg po tid.  9/14/22 labs were sent to us. White blood cell count 10.8, hemoglobin 13.2, MCV 95, platelets 337, lymphocytes 2.3, neutrophils 7.3 which is slightly elevated please make sure your primary care doctor is aware. Glucose 98, creatinine 0.65, sodium 142, potassium 4.1, bilirubin 0.5, alkaline phosphatase 146, AST 17, ALT 35. The alkaline phosphatase and the AST are trending in the right direction. Slight increase in the alt, previousl it was 31. We will continue to monitor this. Also of note the protein was slightly low at 5.9 , please share with your primary care.  Aug 5: glu 93 and bun 6 and cr 0.92 and na 141 and k 4.3 and cl 102 and co2 23. Wbc 10.4 and hg 14.6 and hct 44.3 and plat 404.  Mcv 95. Neutrophils 7.3 little up and prior normal 6.8. Lymphs 2.1. Alb 4.4 and tb 0.8 and db 0.22 and alk 150 ast 27 and alt 23.  Prior June 27: alk 183 and ast 18 and alt 31. So labs trending right way with some variation in ast and alt but still in normal range for ast and alt. No clear acute issues.  We will contact kesha for the u.s done feb 3.  Aug 5  ultrasound shows liver normal in contour and echogenicity.   No suspicious liver lesions. Common bile duct was normal at 2.4 mm and gallbladder absent. Right kidney 9.6 cm with no hydronephrosis. Spleen normal at 8.6 cm. Imaged pancreas portions unremarkable with tail partially not well seen due to gas. Liver vessels patent. Overall good to see that there was no focal lesions and no suspicion for any fibrotic morphology.  She started to drink again and she is having like tequila and has 1 ounce and doing 6 ounces on a rare day and 3 ounces a day. Started 1 month ago.  Pt says labs in Jan 2022 were up and she was ordered a medicine that had diclofenac and Geodon in Jan. She is stil on the geodon at 40mg.  June 27 labs show glucose normal at 86 BUN of 10 creatinine 0.93 down from 0.94.  Sodium 141 potassium 3.9 chloride 103 CO2 24. White blood count still up at 11 but down from 13.5 with.  Hemoglobin 13.2 which is stable.  Platelet count normal at 363 from 411.  MCV normal at 92.  Neutrophils normal at 6.8 and lymphocytes 3.0. Albumin 4.1 bilirubin 1.1 direct bilirubin 0.3.  Alk phos down to 183 from 213.  AST down to 18 and ALT down to 31 from previous AST 17 and ALT 35.  Ideal ALT is less than 25.   June 8 labs showed glucose elevated at 123 and previously was normal but has occasionally been elevated as back in May 3 it was 108.  Please share with primary provider.  Creatinine down to 0.94 from 1.15 so doing better there.  Sodium 138 potassium 4.2 chloride 103 CO2 of 22. White blood  cell count up at 13.5 from 10.3.  Were you ill recently?  Hemoglobin 13.2 platelet count down to normal at 411 from 452.  Neutrophils up at 10.8 and again wonder if he had a recent infection or vaccine?  Lymphocytes 2.1 normal. Albumin 4.5 bilirubin 0.6 which is stable direct bilirubin 0.2.  Alkaline phosphatase slightly lowered to 13 from 220 AST low at 17 from 21 previously 38.  ALT still slightly up at 35 but previously 35 and back on May 3 have been 65. Overall liver labs are dropping but slowing down on that. Any new meds? Maybe role of recent illness?  May 19 labs show glucose down to 97 from 108 before.  BUN of 10.  Creatinine still remains slightly up at 1.15 from 1.13.  He to stay hydrated with the increasing temperatures. Sodium 135 potassium 4.7 chloride 96 CO2 of 21. White blood cell count 10.3 hemoglobin 14.1 platelet count slightly up at 452 and was previously 430.  MCV 92.  Neutrophils 6.4 lymphocytes 2.7 normal. Albumin 4.6 bilirubin 0.6 which is down from 1.0 and prior 1.4.  Direct bilirubin 0.21.  Alk phos remains at 220 was previously 220 and prior 180.  AST down to 21 and previously 38.  ALT down to 35 from previously 65.  May 3 labs show glucose slightly up at 108 and may be not fasting that day?  BUN of 12 creatinine elevated at 1.13 previously 1.06.  Sodium 139 potassium 4.5 chloride 101 CO2 21.  The chloride and CO2 are back to normal which they were off last time. White blood cell count down to 10 hemoglobin 14.8 platelet count 430 MCV 90 neutrophils normal at 6.5 lymphocytes normal now at 2.5. Albumin 4.7 also down from 5.0.  Bilirubin normal now at 1.0 alkaline phosphatase still elevated though at 220 previously was 180.  AST was 38 and ALT 65.  Previously AST 27 and ALT 44. Called pt and no new meds. Did drop citalopram to 10mg po qd and she is now off. Soumya 300mg po tid for 2 weeks or so. Asked if gaining weight and she is not. Smoking a lot of cigarettes and I have seen that before but not common to see. She will work on this and see how she does. She will try to work on that. Redo the labs in 2 weeks.  She stopped her lisinopril and changed to amlodipine and did not start as bp is low. 95/71.  January 19, 2022 ultrasound sent in. Liver appeared to be increased abd coarsened in regards to echogenicity.  No definite liver lesions.  No dilated ducts. Normal hepatopetal flow seen in the main portal vein.  Imaged hepatic veins also were patent. Common bile duct normal at 5 mm. Gallbladder absent. Right kidney 10.6 cm with no hydronephrosis. Left kidney 10.5 cm with no hydronephrosis.  Spleen normal at 8.7 cm. They suspected that the liver could have moderate diffuse fatty infiltration.  I would state that this was also at the time that you are having a medication effect so it could be that threw that reading off.     April 4, 2022 labs show glucose 93 which is normal BUN of 9 creatinine slightly up at 1.05 and was previously 1.01. May want to look at your hydrational state. Sodium 139 potassium 4.7 chloride 99 CO2 of 22. Albumin 4.8 bilirubin 1.2 which is normal and direct bilirubin 0.28 suggesting that you have Gilbert's syndrome or that indirect hyperbilirubinemia that is benign and present about 10% of people. Alkaline phosphatase down to 187 from 202 and down previously from 230.  AST of 24 down from 36 and prior 49. ALT 33 down from 41 and ALT 76.  She had a prior bump in lfts from working on epoxys.  Making cups.  She also asked re asa and crestor and  she has not started the aspirin yet and she was to start to crestor and she will start post the next labs.  March 21 labs show glucose still slightly up at 104 previously 106.  BUN of 7 and creatinine down to 1.01 from 1.19 so that was better.  Sodium 136 potassium 4.2 chloride 101 CO2 slightly low at 18. Total bilirubin normal at 1.1 previously 1.2 direct bilirubin 0.29 and I would suggest that you have Gilbert's.  Gilbert's as you recall is that indirect hyperbilirubinemia that is very common and present about 10% of people. The alkaline phosphatase is down to 202 from 230 and the AST is down to 36 from 49 and ALT 41 from 76.  It would appear that you change something? White blood cell count 9.7 hemoglobin 14.1 platelet count 444 down from 499. Important to note the white cell count also came down from 13.4 to 9.7.  Neutrophils 6.8 and lymphocytes 2.1 both normal. Called pt re the labs. She says did not change meds. 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   March 9 labs show white blood cell count 13.4 up from 11.7 and prior 10.7.  Hemoglobin 14.6.  Platelet count up a little at 499 from 445.  It has been variable before though.  MCV normal at 90.  Neutrophils up at 9.5 and lymphocytes 2.5 down from last time. Glucose 106 slightly up and BUN of 9 creatinine slightly higher at 1.9 and previously 1.15.   Sodium 137 potassium 4.1 chloride 100 CO2 slightly low at 19.  Albumin 4.0.  Total bilirubin 1.2 normal.  Direct bilirubin 0.35.  Alkaline phosphatase up to 233.  AST 49 and ALT 76 and previously AST 29 ALT 40. Called pt as labs trending up. Meds: propanol, furosemide, spirinolactone, basagalar, Spiriva and Symbicort, Geodon and lamotrigine. Ursodiol. Lamotrigine 1% risk for incl fts and on for a year. Geodon: category c for liver injury and occurs within 1-4 weeks and she has been on it for 6-8 weeks. She will discuss with psych re this.   March 3 labs show liver alkaline phosphatase fraction is 76% bone fraction is 24% and intestinal 0%  so clearly the alkaline phosphatase is coming mainly from liver.  This is the normal pattern that one would expect. March 3 labs also show albumin slightly up at 4.9 from 4.7.  Could reflect hydrational status. Total bilirubin normal at 1.2 and direct bilirubin 0.32 suggesting a Gilbert's syndrome which is a benign finding. Alkaline phosphatase remains elevated at 203 and previously was 175.  AST 29 and ALT 40 and previously AST 20 and ALT 34. Unfortunately these labs arrived very late so we need to go over with you what medicines you are on and since when.  They are not dramatically worse but they are definitely drifting up.  February 24 labs show white blood cell count elevated at 11.7 previously was 10.7.  Hemoglobin 14.5 normal platelet count 445 down from 468.  MCV 92.  Neutrophils elevated at 7.1 and lymphocytes 3.4.  Have you been ill?  Glucose 107 BUN of 12 creatinine 1.15 down from 1.26.  Normal is from 0.57 up to 1.0 so still elevated.  Sodium 137 k 3.8 chloride 98 CO2 of 22.  Albumin 4.7 normal now.  Limit 0.8 direct bilirubin 0.18 and alkaline phosphatase elevated at 175.  Previously 107.  AST 20 and ALT 34.  Previously AST 26 and ALT 30.  Need to watch this evolving alkaline phosphatase trend. Called pt: she had surgery on the right elbow and so this could be from bone and had moved ulnar nerve. She also is on some prednisone and she is on prednisone 2.5mg po qd. We need to fractionate the alkaline phosphatase. We can redo the labs next week to check on that.  February 2 labs show white blood cell count 10.7 hemoglobin 14.4 platelet count up at 468.  MCV was 90 which is normal.  Neutrophils 6.3 and lymphocytes elevated at 3.3. Glucose slightly up at 102 BUN of 13 creatinine elevated at 1.26 sodium 139 potassium 3.9 chloride 99 CO2 of 22 albumin elevated at 4.9 bilirubin 0.8 direct bilirubin 0.21 alkaline phosphatase 107 AST 26 and ALT 30 so clearly that must have been that herbal smooth product that you were on and the other herbals and we can reassess from there. Called pt: she had ketorolac also. Start the ursodiol 300mg po bid with food. She will do that and follow labs.  Started atorvastatin since dec 2020 and she is still on that for now and then to do the crestor,  January 4 local labs sent in. Glucose 159 elevated please share with primary provider.  He had a 16 creatinine 0.96 sodium 138 potassium 4.2 chloride 99 calcium 9.9 total bili 0.4 alkaline phosphatase 202 and . Prior December 29 labs showed alk phos 114 AST 28 and ALT 45 so clearly those numbers bumped. White blood cell count 8.4 hemoglobin 13.2 platelet count 350. Cholesterol 226 elevated.  Triglyceride 284 elevated.   elevated.  Hemoglobin A1c slightly better at 7.9 from 8.1%.  TSH 1.78.  Magnesium 2.2. Called pt: need new labs to see what labs doing.  Dec 29 labs: ast 48 and alt 31 and alk 111 and tb 0.4.  December 13 2021  labs show white blood  cell count elevated 11.9 but down from 12.6.  Hemoglobin 15 and platelets up at 503 and previously 394.  That can sometimes go up with inflammation or medicines.  Please be sure to share this info with primary providers. MCV normal at 90.  Neutrophils elevated 7.4 but down from 10.1 back on December 2.  Lymphocytes normal at 3.1 and monocytes slightly up at 1.0. Glucose elevated at 149 and was previously 114.  Hemoglobin A1c up to 8.1 and was previously 6.3.  You should look at this sugar issue again with your primary provider as that is unusual for you. BUN of 12 creatinine 1.19 which is up from 1.014 sodium 136 potassium 4.2 chloride 98 CO2 20. Calcium at 10.4 and was previously normal at 9.6 and I wonder if it is medicine related? Albumin 4.6 bilirubin 0.8 alkaline phosphatase slightly higher at 130 previously 126. AST down to 32 and ALT down to 49 previously AST 65 and ALT 66. C-reactive protein noted to be normal at 2.  Dec 1 labs much better: Tp 6.9 and alb 4.2 and tb 0.2 and db 0.10 and alk 111 and ast 19 and alt 43 and down from prior alk 301 and ast 77 and alt 171. So doing much better. Keep track of any new meds added or removed.  November 98 labs show 123 which is elevated BUN of 18 creatinine 0.99 sodium 137 potassium 5.1 chloride 99 CO2 of 24 albumin 4.7 bilirubin 0.5 alkaline phosphatase down to 118 from 194.  AST 29 and previously 21 ALT down to 46 from 53.  So clearly centimeters drop. She says she had increased the Seroquel and then being weaned on it now. She is now on 50 mg of Seroquel and she is to stop it tonight and she will redo labs in 1-2 weeks to be sure.  Ca 10.3 little up and she is not on supplements for this and follow course. She will let primary md know.  Oct 12: alb 4.5 and tb 0.6 and db 0.19 and alk 301 and ast 77 and alt 171 and prior ast 33 and alt 52. went to 25 and 50 and now 100mg.  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 Says taking dilaudid and ibuprofen for that. She says taking a dilaudid occ and one ibuprofen. 10-1 labs also off. Still on steroids 10mg po qd for the rheum arthritis so enbrel stopped working and went to this. She did myasthenia screen and neg and started on this and trying to do humira sunday.   10-1 alk 199 and ast 56 and alt 149.   September 21 ultrasound showed the liver to be 15.6 cm with mild hepatic steatosis but no focal mass. Liver vessels are noted to be patent. Prior cholecystectomy changes seen. No bile duct dilation seen with common bile duct 5 mm. Visualized pancreas portions were unremarkable proportions of the head and tail the pancreas were not seen due to gas. Right kidney 10.6 cm and left kidney 10.8 cm with no hydronephrosis. Spleen normal at 8.5 cm. Mild liver fat overall was seen patent vessels were seen. No ascites was seen.  September 21 labs show albumin 4.9 which remains slightly up bilirubin 0.4 which is down from 0.8 direct bilirubin 0.13 which is down from 0.23. Alkaline phosphatase down to 198 from 207 AST slightly up at 33 from 28 ALT 52 down from 66.  September 9 redo labs show albumin stable 4.9 bilirubin 0.8 direct 0.23 alkaline phosphatase slightly lower at 207 from 215. AST down to 28 from 61. ALT down to 66 from 86.  So it does appear the labs are dropping with lamictal drop 150 to 100mg and then went up on seroquel. Sept 7 labs: alb 4.8 and tb 0.7 and db 0.2 and alk 215 and Ast 61 and alt 86.  Prior aug 27 albs ast 18 and alt 28 and alk 135. that was pre lamictal changed.  She had prior drinking green tea for a few weeks. She has an immune issue and so the labs spiked up. then stopped and labs settled last year off that.  Pt was to do a bx and trouble breathing and Dr eSlf told to do barium swallow and manometry and did the barium and to do the manometry and says told was ok.  Ent saw some plaques and she had bx done.  Pelvic area pain noted and se started with pain and went to er and went [facility] to [facility] and she said cyst in fallopian tube and ruptured and did a full hysterectomy and that was July 28 2021.  Ex  burned her citizenship certificate. She said she applied to get it. [phone] for that.  Enbrel started earlier for seronegative RA.  She says needed onc re wbc up and asked him to do the lfts and she says part of labs back and she has from aug 8 2021. Ast and alt done and alk 209 and prior 138 and ast 149 and alt prior 34 and alt 231 and 76 july 19. b12 1804 and IGA 68 little low.  May 3:wbc 9.6 hg 13.7 and plat 355 and mcv 93. Neutrophils 6.5. glu 92 and cr 0.95 and na 137 and k 4.3 and ca 9.8 and alb 4.5 and tb 0.3 and alk 177 elevated and ast 30 and alt 70 and tesosterone level 6 normal per chart for your age. Prior alk 277 and ast 126 and alt 315. So came down post that doxycycline exposure.  April 6, 2021 [facility].s at [facility]: pancreas appears normal where seen and liver demonstrates normal size and contour and echogenicity. Liver vessels patent. Gallbladder absent. Common bile duct 4mm. No hydronephrosis seen to kidneys. Spleen 8.4cm. No liver mass seen.  Jan 11: glu 171 elevated and show local md. cr 1.06 and na 137 and k 4.0 and cl 98 and co2 21 and ca 9.7 normal and alb 4.6 and tb 0.7 and alk 88 and ast 13 and alt 16. So liver labs doing very well. A1c 5.9% noted.  Jan 20 2021: ct no cardiomegaly. Clear lung bases. Unremarkable liver. Prior gb surgery changes. Unremarkable spleen. Pancreas and and adrenal glands normal. cyst right kidney. left renal cyst. Mild rectosigmoid wall thickening. Liver vessels patent. No bulk adenopath.  Nov 9 2020 labs: wbc 13. 4hg 14 and plat 453 elevated slightly (150- 450 normal). na 135 k 4.6 and cl 97 and co2 26 and calcium elevated 11.0.  Show to local md: why is calcium up?alb 4.5 and tb 0.6 and alk 165 elevated and ast 22 and alt 36 so back down. chol 336 elevated and trg 307 and ldl 219. Lipids off and not sure if due to recent liver flare or other and may want to redo that as known if liver flared that this can be off. Vit d low 28.8. a1c 5.9%.  She says has had low vit d and she was to start this and she was started on 5000 a day for 4 m and she had been taking some vit d. She says she had stopped it for surgery July and restarted it again.  She says was on doxycycline and said had a prior hx of tcn reaction and filled and she said she may have been more ill from it. No reexposure.  Nov 3 2020 : ast 50 and alt 121 (both down then from 121 and 222). main she is off green tea and the lamictal also off prior. Suspect green tea was immune stimulant reving up your immune system. She has a very active immune system.  Rheum says she has fibromyalgia and doing water therapy.  11-2-20: ast down to 50 and alt 118 and prior 121 and alt 222 . alk 213 still up from 210 and tb down to 0.4 from 0.5.  Oct 26 labs tb 0.5 and alk 210 and ast 121 and alt 222. alk 248 and so now 210 so that is better, and ast 128 and that is now 121 and alt was 248 and now 222 so better and we need to follow.  Oct 19 labs spiked again: alk 198 and ast 128 and alt 248. Tb 0.5. na 136 and k k 4.5. bun 15 and cr 0.94 and glu 112.   Oct 6 ast 16 and alt 25 and alk 111. Prior she was also on abx for 6 weeks and so finished oct 8 or so and so that not the cause.  Oct 6: wbc 11.2 little up, hg 14.4 and hct 42.2, plat 141. Mcv 90, glu 103 little up and maybe not fasting. Bun 20 and cr 0.98 and na 135 and k 4.5, cl 99 and co2 22. Alb 4.2 and tb 0.5 and alk 111 and ast 16 and alt 25.  Prior visit lamictal came down from 75/50 to 50/25 as of oct 9 and added gabapentin 400mg and seroquel 25mg po qhs an atorvastatin 40mg po qhs.  Off the prior nasal exposures also.  Aunt Ewelina passed in oct 2020 had oltx and she had failed from 2nd tx. Not from covid 19.  [facility]s aug 2020 [facility]: they saw mild fatty infiltration of the liver. Prior gb surgery changes seen. Visualized pancreas portions normal. No liver masses. Common duct 4mm. Portal vein patent. Kidneys unremarkable. Spleen 7.8cm unremarkable. Should get better as time passes from the issues we discussed.  Aug 4 2020 labs: glu 61 and little low bun 17 and cr 0.91 and na 135 and k 5.2 and cl 96 and co2 20 and ca 10.9 elevated and show local md. alb 4.7 and tb 0.6 and alk 104 and ast 24 and alt 28.  She was on ursodiol for has been on it for her liver started july 29 and she did stop it . She is staying off this as last time ok and may need it.  Nov 25 2020: wbc 11.3 and hg 14.1 plat 484 elevated and neutrophils 7.7 and glu 144 elevated and cr 1.08 little up.na 139 and k 4.3 and cl 99 and co2 24 and ca 10 and alb 4,7 and tb 0.3 and alk 130 and ast 15 and alt 18. Addendum: 1. Smoothie immune booster did. 2. Michelle took that. 3. She will confirm medicine.  Last time had been down 120 and now 110 in aug.  Addendum: le for a few weeks pre labs in sept: Serum aminotransferase elevations above 3 times the upper limit of normal occurred in 2% to 4% of patients treated with cariprazine in preregistration studies compared with 0.7% to 2% of placebo recipients. Elevations above 5 times ULN were rare <1% and no patient developed clinically apparent liver injury with jaundice or symptoms. Nevertheless, an occasional patient was withdrawn from therapy because of serum aminotransferase elevations usually arising within the first month of treatment and resolving rapidly with drug discontinuation. Since its approval and more widescale use, there have been no published cases of clinically apparent liver injury although the product label lists hepatitis as a possible adverse side effect. Likelihood score: E* (unproven but suspected rare cause of clinically apparent liver injury).

## 2025-02-06 ENCOUNTER — TELEPHONE ENCOUNTER (OUTPATIENT)
Dept: URBAN - METROPOLITAN AREA CLINIC 86 | Facility: CLINIC | Age: 50
End: 2025-02-06

## 2025-02-06 NOTE — HPI-TODAY'S VISIT:
Chanda Sarmiento,  The recent labs are sent to us.  The alkaline phosphatase isoenzymes show that 79% from the liver and 21% from bone.  Therefore this is mainly coming from the liver.  The creatinine 0.97, sodium 139, potassium 4.1, bilirubin 1.9, alkaline phosphatase 226, AST 20 and ALT 29.  Goal for the AST and ALT is less than 25.  White blood cells 8.7, hemoglobin 14.9, MCV 91 platelets 338.  Previously the alkaline phosphatase was 174 and the AST was 16 and ALT was 19 back in November.  Curious if the Lamictal is causing this change?  I see you have a visit with us in early April.  You should have some labs to do before that visit and please be sure to do them. Payton Gonzalez PA-C

## 2025-02-26 ENCOUNTER — OFFICE VISIT (OUTPATIENT)
Dept: URBAN - METROPOLITAN AREA CLINIC 22 | Facility: CLINIC | Age: 50
End: 2025-02-26

## 2025-03-12 ENCOUNTER — OFFICE VISIT (OUTPATIENT)
Dept: URBAN - METROPOLITAN AREA CLINIC 22 | Facility: CLINIC | Age: 50
End: 2025-03-12
Payer: COMMERCIAL

## 2025-03-12 DIAGNOSIS — R74.8 ABNORMAL LIVER ENZYMES: ICD-10-CM

## 2025-03-12 PROCEDURE — 93975 VASCULAR STUDY: CPT

## 2025-03-12 PROCEDURE — 76705 ECHO EXAM OF ABDOMEN: CPT

## 2025-03-17 ENCOUNTER — TELEPHONE ENCOUNTER (OUTPATIENT)
Dept: URBAN - METROPOLITAN AREA CLINIC 86 | Facility: CLINIC | Age: 50
End: 2025-03-17

## 2025-03-17 NOTE — HPI-TODAY'S VISIT:
Chanda Sarmiento,  Recent ultrasound was good to me.  The liver is homogeneous in echotexture normal in echogenicity.  Did not see any lesions.  Common bile duct is 3 mm and this is normal.  The right kidney is 10.1 cm.  There is no ascites.  Spleen about 7.4 cm.  Overall they thought this was a normal exam.  Remember to do the labs before your visit in April. Payton Gonzalez PA-C

## 2025-04-01 ENCOUNTER — TELEPHONE ENCOUNTER (OUTPATIENT)
Dept: URBAN - METROPOLITAN AREA CLINIC 86 | Facility: CLINIC | Age: 50
End: 2025-04-01

## 2025-04-01 NOTE — HPI-TODAY'S VISIT:
Dear Chanda Sarmiento,   January 2025 labs sent and showing WBC 8.7 hemoglobin 14.9 plate count 338 MCV 91 and important to note these are all normal and they have been off in November 7 with a WBC of 16.6 hemoglobin 16.6 and a plate count of 464.  Neutrophils were normal now at 5.4 and lymphocytes as well and that been elevated back in November at 13.9 suggesting some type of infection may have been playing a role in.   Glucose was 95 BUN of 8 creatinine 0.97 sodium 129 potassium 4.1 and chloride 102 CO2 22 albumin 4.4 bilirubin was up to 1.9 from previous 0.9.   Alk phos was 226 up from 174 in November with AST 20 and ALT 29 and previously AST 16 and ALT 19.   Liver alk phos was 79% and bone 21% so this is definitely liver driven issue.   Per Rubina's note there was some concerns about your Lamictal dose being changed?  Will discuss at visit.   Dr. Issa.

## 2025-04-03 ENCOUNTER — OFFICE VISIT (OUTPATIENT)
Dept: URBAN - METROPOLITAN AREA TELEHEALTH 2 | Facility: TELEHEALTH | Age: 50
End: 2025-04-03
Payer: COMMERCIAL

## 2025-04-03 ENCOUNTER — LAB OUTSIDE AN ENCOUNTER (OUTPATIENT)
Dept: URBAN - METROPOLITAN AREA TELEHEALTH 2 | Facility: TELEHEALTH | Age: 50
End: 2025-04-03

## 2025-04-03 DIAGNOSIS — R74.8 ABNORMAL LIVER ENZYMES: ICD-10-CM

## 2025-04-03 DIAGNOSIS — F19.90 ALCOHOL USE DISORDER: ICD-10-CM

## 2025-04-03 DIAGNOSIS — M13.80 SERONEGATIVE ARTHRITIS: ICD-10-CM

## 2025-04-03 DIAGNOSIS — F14.11 COCAINE ABUSE IN REMISSION: ICD-10-CM

## 2025-04-03 DIAGNOSIS — Z98.890 HISTORY OF FUNDOPLICATION: ICD-10-CM

## 2025-04-03 DIAGNOSIS — Z71.6 TOBACCO ABUSE COUNSELING: ICD-10-CM

## 2025-04-03 DIAGNOSIS — F10.91 ALCOHOL USE DISORDER IN REMISSION: ICD-10-CM

## 2025-04-03 DIAGNOSIS — Z90.710 HISTORY OF HYSTERECTOMY: ICD-10-CM

## 2025-04-03 DIAGNOSIS — D80.2 IGA DEFICIENCY: ICD-10-CM

## 2025-04-03 DIAGNOSIS — F33.1 MODERATE EPISODE OF RECURRENT MAJOR DEPRESSIVE DISORDER: ICD-10-CM

## 2025-04-03 DIAGNOSIS — Z71.89 VACCINE COUNSELING: ICD-10-CM

## 2025-04-03 DIAGNOSIS — E66.3 OVERWEIGHT: ICD-10-CM

## 2025-04-03 DIAGNOSIS — Z79.899 HIGH RISK MEDICATION USE: ICD-10-CM

## 2025-04-03 DIAGNOSIS — K71.9 DRUG-INDUCED LIVER DISEASE: ICD-10-CM

## 2025-04-03 DIAGNOSIS — G44.009: ICD-10-CM

## 2025-04-03 DIAGNOSIS — K63.5 POLYP OF COLON, UNSPECIFIED PART OF COLON, UNSPECIFIED TYPE: ICD-10-CM

## 2025-04-03 DIAGNOSIS — F90.9 ATTENTION DEFICIT HYPERACTIVITY DISORDER (ADHD), UNSPECIFIED ADHD TYPE: ICD-10-CM

## 2025-04-03 DIAGNOSIS — K75.4 AUTOIMMUNE HEPATITIS: ICD-10-CM

## 2025-04-03 PROCEDURE — 99215 OFFICE O/P EST HI 40 MIN: CPT

## 2025-04-03 RX ORDER — LISDEXAMFETAMINE DIMESYLATE 60 MG/1
1 TABLET IN THE MORNING TABLET, CHEWABLE ORAL ONCE A DAY
Status: ACTIVE | COMMUNITY

## 2025-04-03 RX ORDER — URSODIOL 300 MG/1
1 CAPSULE CAPSULE ORAL
Qty: 180 | Refills: 1
Start: 2025-03-30

## 2025-04-03 RX ORDER — URSODIOL 300 MG/1
1 CAPSULE CAPSULE ORAL
Qty: 180 | Refills: 1 | Status: ACTIVE | COMMUNITY

## 2025-04-03 RX ORDER — BUDESONIDE, GLYCOPYRROLATE, AND FORMOTEROL FUMARATE 160; 9; 4.8 UG/1; UG/1; UG/1
2 PUFFS AEROSOL, METERED RESPIRATORY (INHALATION) TWICE A DAY
Status: ACTIVE | COMMUNITY

## 2025-04-03 RX ORDER — DIAZEPAM 5 MG/1
1 TABLET AS NEEDED TABLET ORAL ONCE A DAY
Status: ACTIVE | COMMUNITY

## 2025-04-03 RX ORDER — VORTIOXETINE 20 MG/1
1 TABLET TABLET, FILM COATED ORAL ONCE A DAY
Status: ACTIVE | COMMUNITY

## 2025-04-03 RX ORDER — ZIPRASIDONE HYDROCHLORIDE 60 MG/1
1 CAPSULE WITH FOOD CAPSULE ORAL
Status: ACTIVE | COMMUNITY

## 2025-04-03 RX ORDER — LAMOTRIGINE 50 MG/1
1 TABLET TABLET, ORALLY DISINTEGRATING ORAL ONCE A DAY
Status: ACTIVE | COMMUNITY

## 2025-04-03 RX ORDER — ATORVASTATIN CALCIUM 20 MG/1
1 TABLET TABLET, FILM COATED ORAL ONCE A DAY
Status: ACTIVE | COMMUNITY

## 2025-04-03 RX ORDER — VARENICLINE 0.03 MG/.05ML
1 SPRAY IN EACH NOSTRIL SPRAY NASAL TWICE A DAY
Status: ACTIVE | COMMUNITY

## 2025-04-03 NOTE — HPI-TODAY'S VISIT:
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  January 2025 labs sent and showing WBC 8.7 hemoglobin 14.9 plate count 338 MCV 91 and important to note these are all normal and they have been off in November 7 with a WBC of 16.6 hemoglobin 16.6 and a plate count of 464.  Neutrophils were normal now at 5.4 and lymphocytes as well and that been elevated back in November at 13.9 suggesting some type of infection may have been playing a role in. Glucose was 95 BUN of 8 creatinine 0.97 sodium 129 potassium 4.1 and chloride 102 CO2 22 albumin 4.4 bilirubin was up to 1.9 from previous 0.9. Alk phos was 226 up from 174 in November with AST 20 and ALT 29 and previously AST 16 and ALT 19. Liver alk phos was 79% and bone 21% so this is definitely liver driven issue.  She says at time she went from 25 to 50mg lamictal and needs labs/  Need labs asap to see what doing.  March  ultrasound was good to me. The liver is homogeneous in echotexture normal in echogenicity. Did not see any lesions. Common bile duct is 3 mm and this is normal. The right kidney is 10.1 cm. There is no ascites. Spleen about 7.4 cm. Overall they thought this was a normal exam.   Feb  labs are sent to us. The alkaline phosphatase isoenzymes show that 79% from the liver and 21% from bone. Therefore this is mainly coming from the liver. The creatinine 0.97, sodium 139, potassium 4.1, bilirubin 1.9, alkaline phosphatase 226, AST 20 and ALT 29. Goal for the AST and ALT is less than 25. White blood cells 8.7, hemoglobin 14.9, MCV 91 platelets 338. Previously the alkaline phosphatase was 174 and the AST was 16 and ALT was 19 back in November.   1/28/25 November 7 were sent in showing an alk phos elevated at 174 AST of 16 ALT of 19 and a bilirubin 0.9. There was also another labs from November 7 labs that I sent prior to you a note back in November 12 as well.  Those labs and these would suggest that we need to fractionate the alk phos to see if it is coming mainly from liver or bone or intestine source.  Nov 7 wbc 16.6 (wbc 7.3 pre and maybe recent steroid role) and hg 16.6 elevated (prior hg 14.8) and hct 49.3 and mcv 93. Platelets 464 elevated and prior 342. Neutrophils 13.9 little up and maybe from steroids.  Lamictal started in Oct to Nov.  Chol 171 and trg 147 and hdl 65 and ldl 81.  A1c 5.8.Free t4 1.27 and tsh 1.520.  Vitamin d 34 normal.   October 21 labs showed WBC 7.3 hemoglobin 14.8 platelet count 342 MCV 95 and neutrophils and lymphocytes normal at 4.2 and 2.1 respectively. Glucose normal at 89 and BUN of 9 creatinine 0.68 sodium 144 potassium 4.7. Chloride a little bit up to 107 and calcium level 10.3. Albumin on the vitamin D or calcium supplements lately? Albumin 4.5 and bilirubin normal at 1.0. The alk phos that was elevated back in May at 212 with the AST of 48 and ALT of 68 has improved now with the alk phos down to 161 and AST of 21 and ALT of 19.  The July 26 labs that showed an alk phos of 167 AST 25 and ALT of 31 so these labs are definitely a little lower than those but not back to baseline yet.    She is working for [employer]. Visiting family in [facility]. Oct 20 2024.  The September 5 labs sent the bilirubin was 1.0, alkaline phosphatase 260, AST 36, ALT 53. Previously back in July the alkaline phosphatase was 167 and the AST was 25 and the ALT was 31. These are higher.   No new meds. Need to redo when back 10-21.  Asked what was newer med or change and she says eye drops.  Aug 20 u.s liver was homogeneous in echotexture and common bile duct measures 2mm. Gallbladder views show post gb surgery status. No ascites. Right kidney 10cm and no hydronephrosis. Vessels open for liver and spleen and spleen normal at 7cm. They overall felt u.s was normal re parenchymal exam and echotexture and patent vessels and great to see as means liver is doing well and no obvious cumulative damage.   July 26 total protein 70 and alb 4.3 and tb 0.5 and direct bili 0.15 and alk 167 elevated and lower from 184. Ast 25 and alt 31 and prior ast 37 and alt 70.  June 25 labs show total protein 7.5, albumin 4.7, bilirubin 0.8 and direct bilirubin 0.2 and these are all normal. Your alk phos is coming down to 184 from 202 AST about the same at 37 from 25 and ALT went up to 70 from 60.  She says she had the doxepin that was removed 2-3 m and switched to valium and after the valium took temazepam and just stopped and back to valium.  May 23 labs show total protein 7.0 and alb 4.3 and tb 0.8 and direct 0.23 and alk 202 and prior 150 on their listed comparison but see below for more recent one. Ast 35 and alt 60.  Earlier May 2024 labs alk 212 and ast 48 and alt 68 and so  ast and alt seem to be lower now and also see a minimal drop seen in alk phos to 202.  May 2: Glucose 109 and elevated and not clear if fasting. Bun 14 and cr 0.77 and na 137 and k 3.9 and cl 103 and co2 21 and ca 9.8 and alb 4.2 and Tb 0.8 and alk 212 and prior 181 and so higher. Ast 48 and alt 68 also up and prior ast 18 and alt 28 so suggests a new medicine cause. Wbc 9.4 and hg 14.4 and hct 44 and mcv 95 and platelets 366. Neutrophils 5.6 and lymphs 2.6.   January 5, 2024 ultrasound finally was routed to your chart. Part of the problem was the name change (they has [place] listed) and  I think that threw them off even though the date of birth is the same. Pancreas was unremarkable. Liver was homogeneous/even in echotexture and with no discrete lesions. Liver vessels were patent as expected. Common bile duct was normal at 3 mm. Right kidney was 10.2 cm with no hydronephrosis. Spleen normal at 7.5 cm. Splenic vessels patent.  January 2 labs show WBC normal at 8.8 hemoglobin 15.2 platelet count 376 MCV 94. Neutrophils and lymphocytes both normal. Glucose slightly low at 66 with normal for that lab being 70-99. BUN of 10 creatinine 0.78 sodium 143 potassium 4.3 calcium 10.2 albumin 4.6 bilirubin 0.5 and only your alkaline phosphatase was elevated at 181. AST was normal at 18 and ALT was normal at 28. Prior labs that you did in September 18 had shown your alk 172  AST 23 and ALT 35 so these appear to be lower now than then. Unclear why you are alk phos is isolated up and we may want to talk about fractionating your alkaline phosphatase. It is still possible that a medication could be causing that to be off as well so lets go over your medicine list at the upcoming visit and review these options with you.  Trintellix started in Dec and remains on it.  Oct 20 2023 glu 129 and bun 9 and cr 0.9 and na 140 and k 5.0 and cl 101 and co2 23 and ca 10.3 and alb 4.6 and tb 0.9 and alk 149 and ast 23 and alt 39.   September 18 sent in. Glucose was slightly up at 102 BUN of 14 creatinine 0.86 sodium 140 potassium 4.7 calcium 10.2 albumin 4.4 bilirubin 0.4 alk phos elevated at 172 and back in September 11 of been 167. Your AST was higher at 23 and ALT higher at 35 and previous in September 11 your AST was 13 and ALT of 17. Sed rate was noted to be 2 and C-reactive protein was 1 and PTH was 33. September 11 labs show WBC 7.7 hemoglobin elevated 16.7 plate count 423 MCV 93 with a BUN of 9 creatinine 0.96 sodium 142 potassium 5.2 elevated calcium of 10.6. AST then again 13 and ALT of 17 alk phos 167. Cholesterol was up at 270 and triglycerides were up at 378 and HDL 48 and  so those were definitely off. INR normal at 1.0. B12 normal at 637 and folate normal at 9.9. Hemoglobin A1c slightly up at 5.8 so you need to watch that as you may be moving into prediabetes. TSH 1.67. Uric acid was 3.2. For comparison back in February your AST was 25 and ALT 29 alk phos 161. Very important to see what are you doing differently between September 11 and the September 18 labs.  Full labs from July 22 sent in. You read me the essential ones but the full labs showed glucose 72 BUN of 8 creatinine 0.64 sodium 137 potassium 4.2 chloride 106 CO2 of 22 BUN 4.4 bilirubin 0.7 alk phos elevated at 172 AST 24 and ALT 20. Back in May your alk phos of been 157 so that appears to be a little higher. TSH 0.76. WBC 7.1, hemoglobin up at 16.6 hematocrit up at 50.8 plate count 436 elevated MCV 99. Neutrophils 3479 lymphocytes 2918. Hep B core total negative. RPR negative. HIV test negative. Hep B surface antigen negative. Hep C antibody negative. July 22 drug screen had been positive for alcohol metabolites, amphetamine, and marijuana.  She is 9 m without any alcohol.  August 30 ultrasound shows liver echogenicity to be normal with no focal lesions. Common bile duct was normal at 3 mm. Prior cholecystectomy changes were seen. No ascites was seen. Visualized pancreas portions appeared unremarkable. Liver vessels were patent. Spleen was normal at 7.4 cm. Right kidney 10.2 cm with no hydronephrosis. In summary you have a normal-appearing liver and patent vessels.  Prior cholecystectomy changes were seen.  You mentioned she justy left rehab x 28 days and she says sent to [facility].  July 22: last ones: ast 24 and alt 20 and alk 172 up and hg 16.6 cr 0.64. She [facility] to [facility].  She has been off the alcohol now 41 day.  Labs may be better now as further than the last labs.  Pt last visit had beenm having some alcohol but she has cut back some and she is following with therapy and having 8 ounces and day and if have more than 2-3 drinks a day at risk for cirrhosis,  May 11 2023 glu 70 and and bun 11 and cr 0.68 and na 136 and k 4 and cl 99 and co2 26 and ca 10.3 and alb 4.6 and tb 1.1 and alk 157 and ast 34 and alt 60 and mg 2.1 and tsh 1.10 and wbc 9.3 and hg 15.9 and hct 45.5 and platelet 362, mcv 93.4  neutrophils 6371 and lymphs 2018.[id number] and folate 14 vit d 78 and ethyl alcohol and over [id number].  Re meds: none in rehab and stratera 60mg qd and 40mg escitalopram and geodon 60mg po qd.  February 6 showed that the pancreas was unremarkable, the liver was homogeneous/even in echotexture with no discrete lesions. Liver vessels were patent. Common bile duct was normal at 2 mm.  Right kidney was 10.8 cm with no hydronephrosis. Spleen was normal at 7.9 cm with splenic vessels patent.  Pt says she was drinking more in 2020 and not until this past year. 6m of this,  Smoking 1/2 ppd and need to work on this.  She says mother is doing study with [employer]. Doing a clinical study for her lungs and for chronic bronchitis.  She says she had labs done last week. Rheum saw.  2-7-2023 and wbc 8.6 hg 15 and plat 387 and neutrophils 4.9 and lymph 2.7 and gly 89 and bun 11 and cr 0.85 and na 140 and k 4.2 and cl 99 and co2 24 and alb 4.7 and tb 0.9 and alk 161 and ast 25 and alt 29.  Alcohol could not sense with the vyvance.  She was changed from adderall to vyvanse and maybe for 3m. She is on ursodiol 300mg po tid.  9/14/22 labs were sent to us. White blood cell count 10.8, hemoglobin 13.2, MCV 95, platelets 337, lymphocytes 2.3, neutrophils 7.3 which is slightly elevated please make sure your primary care doctor is aware. Glucose 98, creatinine 0.65, sodium 142, potassium 4.1, bilirubin 0.5, alkaline phosphatase 146, AST 17, ALT 35. The alkaline phosphatase and the AST are trending in the right direction. Slight increase in the alt, previousl it was 31. We will continue to monitor this. Also of note the protein was slightly low at 5.9 , please share with your primary care.  Aug 5: glu 93 and bun 6 and cr 0.92 and na 141 and k 4.3 and cl 102 and co2 23. Wbc 10.4 and hg 14.6 and hct 44.3 and plat 404.  Mcv 95. Neutrophils 7.3 little up and prior normal 6.8. Lymphs 2.1. Alb 4.4 and tb 0.8 and db 0.22 and alk 150 ast 27 and alt 23.  Prior June 27: alk 183 and ast 18 and alt 31. So labs trending right way with some variation in ast and alt but still in normal range for ast and alt. No clear acute issues.  We will contact [facility] for the u.s done feb 3.  Aug 5  ultrasound shows liver normal in contour and echogenicity.   No suspicious liver lesions. Common bile duct was normal at 2.4 mm and gallbladder absent. Right kidney 9.6 cm with no hydronephrosis. Spleen normal at 8.6 cm. Imaged pancreas portions unremarkable with tail partially not well seen due to gas. Liver vessels patent. Overall good to see that there was no focal lesions and no suspicion for any fibrotic morphology.  She started to drink again and she is having like tequila and has 1 ounce and doing 6 ounces on a rare day and 3 ounces a day. Started 1 month ago.  Pt says labs in Jan 2022 were up and she was ordered a medicine that had diclofenac and Geodon in Jan. She is stil on the geodon at 40mg.  June 27 labs show glucose normal at 86 BUN of 10 creatinine 0.93 down from 0.94.  Sodium 141 potassium 3.9 chloride 103 CO2 24. White blood count still up at 11 but down from 13.5 with.  Hemoglobin 13.2 which is stable.  Platelet count normal at 363 from 411.  MCV normal at 92.  Neutrophils normal at 6.8 and lymphocytes 3.0. Albumin 4.1 bilirubin 1.1 direct bilirubin 0.3.  Alk phos down to 183 from 213.  AST down to 18 and ALT down to 31 from previous AST 17 and ALT 35.  Ideal ALT is less than 25.   June 8 labs showed glucose elevated at 123 and previously was normal but has occasionally been elevated as back in May 3 it was 108.  Please share with primary provider.  Creatinine down to 0.94 from 1.15 so doing better there.  Sodium 138 potassium 4.2 chloride 103 CO2 of 22. White blood  cell count up at 13.5 from 10.3.  Were you ill recently?  Hemoglobin 13.2 platelet count down to normal at 411 from 452.  Neutrophils up at 10.8 and again wonder if he had a recent infection or vaccine?  Lymphocytes 2.1 normal. Albumin 4.5 bilirubin 0.6 which is stable direct bilirubin 0.2.  Alkaline phosphatase slightly lowered to 13 from 220 AST low at 17 from 21 previously 38.  ALT still slightly up at 35 but previously 35 and back on May 3 have been 65. Overall liver labs are dropping but slowing down on that. Any new meds? Maybe role of recent illness?  May 19 labs show glucose down to 97 from 108 before.  BUN of 10.  Creatinine still remains slightly up at 1.15 from 1.13.  He to stay hydrated with the increasing temperatures. Sodium 135 potassium 4.7 chloride 96 CO2 of 21. White blood cell count 10.3 hemoglobin 14.1 platelet count slightly up at 452 and was previously 430.  MCV 92.  Neutrophils 6.4 lymphocytes 2.7 normal. Albumin 4.6 bilirubin 0.6 which is down from 1.0 and prior 1.4.  Direct bilirubin 0.21.  Alk phos remains at 220 was previously 220 and prior 180.  AST down to 21 and previously 38.  ALT down to 35 from previously 65.  May 3 labs show glucose slightly up at 108 and may be not fasting that day?  BUN of 12 creatinine elevated at 1.13 previously 1.06.  Sodium 139 potassium 4.5 chloride 101 CO2 21.  The chloride and CO2 are back to normal which they were off last time. White blood cell count down to 10 hemoglobin 14.8 platelet count 430 MCV 90 neutrophils normal at 6.5 lymphocytes normal now at 2.5. Albumin 4.7 also down from 5.0.  Bilirubin normal now at 1.0 alkaline phosphatase still elevated though at 220 previously was 180.  AST was 38 and ALT 65.  Previously AST 27 and ALT 44. Called pt and no new meds. Did drop citalopram to 10mg po qd and she is now off. Soumya 300mg po tid for 2 weeks or so. Asked if gaining weight and she is not. Smoking a lot of cigarettes and I have seen that before but not common to see. She will work on this and see how she does. She will try to work on that. Redo the labs in 2 weeks.  She stopped her lisinopril and changed to amlodipine and did not start as bp is low. 95/71.  January 19, 2022 ultrasound sent in. Liver appeared to be increased abd coarsened in regards to echogenicity.  No definite liver lesions.  No dilated ducts. Normal hepatopetal flow seen in the main portal vein.  Imaged hepatic veins also were patent. Common bile duct normal at 5 mm. Gallbladder absent. Right kidney 10.6 cm with no hydronephrosis. Left kidney 10.5 cm with no hydronephrosis.  Spleen normal at 8.7 cm. They suspected that the liver could have moderate diffuse fatty infiltration.  I would state that this was also at the time that you are having a medication effect so it could be that threw that reading off.     April 4, 2022 labs show glucose 93 which is normal BUN of 9 creatinine slightly up at 1.05 and was previously 1.01. May want to look at your hydrational state. Sodium 139 potassium 4.7 chloride 99 CO2 of 22. Albumin 4.8 bilirubin 1.2 which is normal and direct bilirubin 0.28 suggesting that you have Gilbert's syndrome or that indirect hyperbilirubinemia that is benign and present about 10% of people. Alkaline phosphatase down to 187 from 202 and down previously from 230.  AST of 24 down from 36 and prior 49. ALT 33 down from 41 and ALT 76.  She had a prior bump in lfts from working on epoxys.  Making cups.  She also asked re asa and crestor and  she has not started the aspirin yet and she was to start to crestor and she will start post the next labs.  March 21 labs show glucose still slightly up at 104 previously 106.  BUN of 7 and creatinine down to 1.01 from 1.19 so that was better.  Sodium 136 potassium 4.2 chloride 101 CO2 slightly low at 18. Total bilirubin normal at 1.1 previously 1.2 direct bilirubin 0.29 and I would suggest that you have Gilbert's.  Gilbert's as you recall is that indirect hyperbilirubinemia that is very common and present about 10% of people. The alkaline phosphatase is down to 202 from 230 and the AST is down to 36 from 49 and ALT 41 from 76.  It would appear that you change something? White blood cell count 9.7 hemoglobin 14.1 platelet count 444 down from 499. Important to note the white cell count also came down from 13.4 to 9.7.  Neutrophils 6.8 and lymphocytes 2.1 both normal. Called pt re the labs. She says did not change meds. 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   March 9 labs show white blood cell count 13.4 up from 11.7 and prior 10.7.  Hemoglobin 14.6.  Platelet count up a little at 499 from 445.  It has been variable before though.  MCV normal at 90.  Neutrophils up at 9.5 and lymphocytes 2.5 down from last time. Glucose 106 slightly up and BUN of 9 creatinine slightly higher at 1.9 and previously 1.15.   Sodium 137 potassium 4.1 chloride 100 CO2 slightly low at 19.  Albumin 4.0.  Total bilirubin 1.2 normal.  Direct bilirubin 0.35.  Alkaline phosphatase up to 233.  AST 49 and ALT 76 and previously AST 29 ALT 40. Called pt as labs trending up. Meds: propanol, furosemide, spirinolactone, basagalar, Spiriva and Symbicort, Geodon and lamotrigine. Ursodiol. Lamotrigine 1% risk for incl fts and on for a year. Geodon: category c for liver injury and occurs within 1-4 weeks and she has been on it for 6-8 weeks. She will discuss with psych re this.   March 3 labs show liver alkaline phosphatase fraction is 76% bone fraction is 24% and intestinal 0%  so clearly the alkaline phosphatase is coming mainly from liver.  This is the normal pattern that one would expect. March 3 labs also show albumin slightly up at 4.9 from 4.7.  Could reflect hydrational status. Total bilirubin normal at 1.2 and direct bilirubin 0.32 suggesting a Gilbert's syndrome which is a benign finding. Alkaline phosphatase remains elevated at 203 and previously was 175.  AST 29 and ALT 40 and previously AST 20 and ALT 34. Unfortunately these labs arrived very late so we need to go over with you what medicines you are on and since when.  They are not dramatically worse but they are definitely drifting up.  February 24 labs show white blood cell count elevated at 11.7 previously was 10.7.  Hemoglobin 14.5 normal platelet count 445 down from 468.  MCV 92.  Neutrophils elevated at 7.1 and lymphocytes 3.4.  Have you been ill?  Glucose 107 BUN of 12 creatinine 1.15 down from 1.26.  Normal is from 0.57 up to 1.0 so still elevated.  Sodium 137 k 3.8 chloride 98 CO2 of 22.  Albumin 4.7 normal now.  Limit 0.8 direct bilirubin 0.18 and alkaline phosphatase elevated at 175.  Previously 107.  AST 20 and ALT 34.  Previously AST 26 and ALT 30.  Need to watch this evolving alkaline phosphatase trend. Called pt: she had surgery on the right elbow and so this could be from bone and had moved ulnar nerve. She also is on some prednisone and she is on prednisone 2.5mg po qd. We need to fractionate the alkaline phosphatase. We can redo the labs next week to check on that.  February 2 labs show white blood cell count 10.7 hemoglobin 14.4 platelet count up at 468.  MCV was 90 which is normal.  Neutrophils 6.3 and lymphocytes elevated at 3.3. Glucose slightly up at 102 BUN of 13 creatinine elevated at 1.26 sodium 139 potassium 3.9 chloride 99 CO2 of 22 albumin elevated at 4.9 bilirubin 0.8 direct bilirubin 0.21 alkaline phosphatase 107 AST 26 and ALT 30 so clearly that must have been that herbal smooth product that you were on and the other herbals and we can reassess from there. Called pt: she had ketorolac also. Start the ursodiol 300mg po bid with food. She will do that and follow labs.  Started atorvastatin since dec 2020 and she is still on that for now and then to do the crestor,  January 4 local labs sent in. Glucose 159 elevated please share with primary provider.  He had a 16 creatinine 0.96 sodium 138 potassium 4.2 chloride 99 calcium 9.9 total bili 0.4 alkaline phosphatase 202 and . Prior December 29 labs showed alk phos 114 AST 28 and ALT 45 so clearly those numbers bumped. White blood cell count 8.4 hemoglobin 13.2 platelet count 350. Cholesterol 226 elevated.  Triglyceride 284 elevated.   elevated.  Hemoglobin A1c slightly better at 7.9 from 8.1%.  TSH 1.78.  Magnesium 2.2. Called pt: need new labs to see what labs doing.  Dec 29 labs: ast 48 and alt 31 and alk 111 and tb 0.4.  December 13 2021  labs show white blood  cell count elevated 11.9 but down from 12.6.  Hemoglobin 15 and platelets up at 503 and previously 394.  That can sometimes go up with inflammation or medicines.  Please be sure to share this info with primary providers. MCV normal at 90.  Neutrophils elevated 7.4 but down from 10.1 back on December 2.  Lymphocytes normal at 3.1 and monocytes slightly up at 1.0. Glucose elevated at 149 and was previously 114.  Hemoglobin A1c up to 8.1 and was previously 6.3.  You should look at this sugar issue again with your primary provider as that is unusual for you. BUN of 12 creatinine 1.19 which is up from 1.014 sodium 136 potassium 4.2 chloride 98 CO2 20. Calcium at 10.4 and was previously normal at 9.6 and I wonder if it is medicine related? Albumin 4.6 bilirubin 0.8 alkaline phosphatase slightly higher at 130 previously 126. AST down to 32 and ALT down to 49 previously AST 65 and ALT 66. C-reactive protein noted to be normal at 2.  Dec 1 labs much better: Tp 6.9 and alb 4.2 and tb 0.2 and db 0.10 and alk 111 and ast 19 and alt 43 and down from prior alk 301 and ast 77 and alt 171. So doing much better. Keep track of any new meds added or removed.  November 98 labs show 123 which is elevated BUN of 18 creatinine 0.99 sodium 137 potassium 5.1 chloride 99 CO2 of 24 albumin 4.7 bilirubin 0.5 alkaline phosphatase down to 118 from 194.  AST 29 and previously 21 ALT down to 46 from 53.  So clearly centimeters drop. She says she had increased the Seroquel and then being weaned on it now. She is now on 50 mg of Seroquel and she is to stop it tonight and she will redo labs in 1-2 weeks to be sure.  Ca 10.3 little up and she is not on supplements for this and follow course. She will let primary md know.  Oct 12: alb 4.5 and tb 0.6 and db 0.19 and alk 301 and ast 77 and alt 171 and prior ast 33 and alt 52. went to 25 and 50 and now 100mg.  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 Says taking dilaudid and ibuprofen for that. She says taking a dilaudid occ and one ibuprofen. 10-1 labs also off. Still on steroids 10mg po qd for the rheum arthritis so enbrel stopped working and went to this. She did myasthenia screen and neg and started on this and trying to do humira sunday.   10-1 alk 199 and ast 56 and alt 149.   September 21 ultrasound showed the liver to be 15.6 cm with mild hepatic steatosis but no focal mass. Liver vessels are noted to be patent. Prior cholecystectomy changes seen. No bile duct dilation seen with common bile duct 5 mm. Visualized pancreas portions were unremarkable proportions of the head and tail the pancreas were not seen due to gas. Right kidney 10.6 cm and left kidney 10.8 cm with no hydronephrosis. Spleen normal at 8.5 cm. Mild liver fat overall was seen patent vessels were seen. No ascites was seen.  September 21 labs show albumin 4.9 which remains slightly up bilirubin 0.4 which is down from 0.8 direct bilirubin 0.13 which is down from 0.23. Alkaline phosphatase down to 198 from 207 AST slightly up at 33 from 28 ALT 52 down from 66.  September 9 redo labs show albumin stable 4.9 bilirubin 0.8 direct 0.23 alkaline phosphatase slightly lower at 207 from 215. AST down to 28 from 61. ALT down to 66 from 86.  So it does appear the labs are dropping with lamictal drop 150 to 100mg and then went up on seroquel. Sept 7 labs: alb 4.8 and tb 0.7 and db 0.2 and alk 215 and Ast 61 and alt 86.  Prior aug 27 albs ast 18 and alt 28 and alk 135. that was pre lamictal changed.  She had prior drinking green tea for a few weeks. She has an immune issue and so the labs spiked up. then stopped and labs settled last year off that.  Pt was to do a bx and trouble breathing and Dr Self told to do barium swallow and manometry and did the barium and to do the manometry and says told was ok.  Ent saw some plaques and she had bx done.  Pelvic area pain noted and se started with pain and went to er and went [facility] to [facility] and she said cyst in fallopian tube and ruptured and did a full hysterectomy and that was July 28 2021.  Ex  burned her citizenship certificate. She said she applied to get it. [phone] for that.  Enbrel started earlier for seronegative RA.  She says needed onc re wbc up and asked him to do the lfts and she says part of labs back and she has from aug 8 2021. Ast and alt done and alk 209 and prior 138 and ast 149 and alt prior 34 and alt 231 and 76 july 19. b12 1804 and IGA 68 little low.  May 3:wbc 9.6 hg 13.7 and plat 355 and mcv 93. Neutrophils 6.5. glu 92 and cr 0.95 and na 137 and k 4.3 and ca 9.8 and alb 4.5 and tb 0.3 and alk 177 elevated and ast 30 and alt 70 and tesosterone level 6 normal per chart for your age. Prior alk 277 and ast 126 and alt 315. So came down post that doxycycline exposure.  April 6, 2021 [facility] at [facility]: pancreas appears normal where seen and liver demonstrates normal size and contour and echogenicity. Liver vessels patent. Gallbladder absent. Common bile duct 4mm. No hydronephrosis seen to kidneys. Spleen 8.4cm. No liver mass seen.  Jan 11: glu 171 elevated and show local md. cr 1.06 and na 137 and k 4.0 and cl 98 and co2 21 and ca 9.7 normal and alb 4.6 and tb 0.7 and alk 88 and ast 13 and alt 16. So liver labs doing very well. A1c 5.9% noted.  Jan 20 2021: ct no cardiomegaly. Clear lung bases. Unremarkable liver. Prior gb surgery changes. Unremarkable spleen. Pancreas and and adrenal glands normal. cyst right kidney. left renal cyst. Mild rectosigmoid wall thickening. Liver vessels patent. No bulk adenopath.  Nov 9 2020 labs: wbc 13. 4hg 14 and plat 453 elevated slightly (150- 450 normal). na 135 k 4.6 and cl 97 and co2 26 and calcium elevated 11.0.  Show to local md: why is calcium up?alb 4.5 and tb 0.6 and alk 165 elevated and ast 22 and alt 36 so back down. chol 336 elevated and trg 307 and ldl 219. Lipids off and not sure if due to recent liver flare or other and may want to redo that as known if liver flared that this can be off. Vit d low 28.8. a1c 5.9%.  She says has had low vit d and she was to start this and she was started on 5000 a day for 4 m and she had been taking some vit d. She says she had stopped it for surgery July and restarted it again.  She says was on doxycycline and said had a prior hx of tcn reaction and filled and she said she may have been more ill from it. No reexposure.  Nov 3 2020 : ast 50 and alt 121 (both down then from 121 and 222). main she is off green tea and the lamictal also off prior. Suspect green tea was immune stimulant reving up your immune system. She has a very active immune system.  Rheum says she has fibromyalgia and doing water therapy.  11-2-20: ast down to 50 and alt 118 and prior 121 and alt 222 . alk 213 still up from 210 and tb down to 0.4 from 0.5.  Oct 26 labs tb 0.5 and alk 210 and ast 121 and alt 222. alk 248 and so now 210 so that is better, and ast 128 and that is now 121 and alt was 248 and now 222 so better and we need to follow.  Oct 19 labs spiked again: alk 198 and ast 128 and alt 248. Tb 0.5. na 136 and k k 4.5. bun 15 and cr 0.94 and glu 112.   Oct 6 ast 16 and alt 25 and alk 111. Prior she was also on abx for 6 weeks and so finished oct 8 or so and so that not the cause.  Oct 6: wbc 11.2 little up, hg 14.4 and hct 42.2, plat 141. Mcv 90, glu 103 little up and maybe not fasting. Bun 20 and cr 0.98 and na 135 and k 4.5, cl 99 and co2 22. Alb 4.2 and tb 0.5 and alk 111 and ast 16 and alt 25.  Prior visit lamictal came down from 75/50 to 50/25 as of oct 9 and added gabapentin 400mg and seroquel 25mg po qhs an atorvastatin 40mg po qhs.  Off the prior nasal exposures also.  Aunt Ewelina passed in oct 2020 had oltx and she had failed from 2nd tx. Not from covid 19.  [facility] aug 2020 [facility]: they saw mild fatty infiltration of the liver. Prior gb surgery changes seen. Visualized pancreas portions normal. No liver masses. Common duct 4mm. Portal vein patent. Kidneys unremarkable. Spleen 7.8cm unremarkable. Should get better as time passes from the issues we discussed.  Aug 4 2020 labs: glu 61 and little low bun 17 and cr 0.91 and na 135 and k 5.2 and cl 96 and co2 20 and ca 10.9 elevated and show local md. alb 4.7 and tb 0.6 and alk 104 and ast 24 and alt 28.  She was on ursodiol for has been on it for her liver started july 29 and she did stop it . She is staying off this as last time ok and may need it.  Nov 25 2020: wbc 11.3 and hg 14.1 plat 484 elevated and neutrophils 7.7 and glu 144 elevated and cr 1.08 little up.na 139 and k 4.3 and cl 99 and co2 24 and ca 10 and alb 4,7 and tb 0.3 and alk 130 and ast 15 and alt 18. Addendum: 1. Smoothie immune booster did. 2. Michelle took that. 3. She will confirm medicine.  Last time had been down 120 and now 110 in aug.  Addendum: le for a few weeks pre labs in sept: Serum aminotransferase elevations above 3 times the upper limit of normal occurred in 2% to 4% of patients treated with cariprazine in preregistration studies compared with 0.7% to 2% of placebo recipients. Elevations above 5 times ULN were rare <1% and no patient developed clinically apparent liver injury with jaundice or symptoms. Nevertheless, an occasional patient was withdrawn from therapy because of serum aminotransferase elevations usually arising within the first month of treatment and resolving rapidly with drug discontinuation. Since its approval and more widescale use, there have been no published cases of clinically apparent liver injury although the product label lists hepatitis as a possible adverse side effect. Likelihood score: E* (unproven but suspected rare cause of clinically apparent liver injury).  Plan: 1. Pt will do labs now on the lamictal higher dose. 2. Pt will do labs now in 4 and 8 and 12 weeks, 3. Pt will call if issues.  Duration of visit was 44 minutes with 20 minutes spent prepping chart and loading info for the visit to [facility] records and then 24  additional minutes for this [facility] telemed visit reviewing chart and events and plans with the pt.

## 2025-04-07 ENCOUNTER — LAB OUTSIDE AN ENCOUNTER (OUTPATIENT)
Dept: URBAN - METROPOLITAN AREA TELEHEALTH 2 | Facility: TELEHEALTH | Age: 50
End: 2025-04-07

## 2025-04-30 ENCOUNTER — TELEPHONE ENCOUNTER (OUTPATIENT)
Dept: URBAN - METROPOLITAN AREA CLINIC 86 | Facility: CLINIC | Age: 50
End: 2025-04-30

## 2025-04-30 RX ORDER — URSODIOL 300 MG/1
1 CAPSULE CAPSULE ORAL
Qty: 180 | Refills: 0
Start: 2025-03-30

## 2025-05-01 ENCOUNTER — LAB OUTSIDE AN ENCOUNTER (OUTPATIENT)
Dept: URBAN - METROPOLITAN AREA TELEHEALTH 2 | Facility: TELEHEALTH | Age: 50
End: 2025-05-01

## 2025-05-19 ENCOUNTER — LAB OUTSIDE AN ENCOUNTER (OUTPATIENT)
Dept: URBAN - METROPOLITAN AREA CLINIC 23 | Facility: CLINIC | Age: 50
End: 2025-05-19

## 2025-05-19 ENCOUNTER — OFFICE VISIT (OUTPATIENT)
Dept: URBAN - METROPOLITAN AREA CLINIC 23 | Facility: CLINIC | Age: 50
End: 2025-05-19

## 2025-05-19 RX ORDER — DIAZEPAM 5 MG/1
1 TABLET AS NEEDED TABLET ORAL ONCE A DAY
Status: ACTIVE | COMMUNITY

## 2025-05-19 RX ORDER — URSODIOL 300 MG/1
1 CAPSULE CAPSULE ORAL
Qty: 180 | Refills: 0 | Status: ACTIVE | COMMUNITY
Start: 2025-03-30

## 2025-05-19 RX ORDER — VORTIOXETINE 20 MG/1
1 TABLET TABLET, FILM COATED ORAL ONCE A DAY
Status: ACTIVE | COMMUNITY

## 2025-05-19 RX ORDER — ATORVASTATIN CALCIUM 20 MG/1
1 TABLET TABLET, FILM COATED ORAL ONCE A DAY
Status: ACTIVE | COMMUNITY

## 2025-05-19 RX ORDER — BUDESONIDE, GLYCOPYRROLATE, AND FORMOTEROL FUMARATE 160; 9; 4.8 UG/1; UG/1; UG/1
2 PUFFS AEROSOL, METERED RESPIRATORY (INHALATION) TWICE A DAY
Status: ACTIVE | COMMUNITY

## 2025-05-19 RX ORDER — LISDEXAMFETAMINE DIMESYLATE 60 MG/1
1 TABLET IN THE MORNING TABLET, CHEWABLE ORAL ONCE A DAY
Status: ACTIVE | COMMUNITY

## 2025-05-19 RX ORDER — VARENICLINE 0.03 MG/.05ML
1 SPRAY IN EACH NOSTRIL SPRAY NASAL TWICE A DAY
Status: ACTIVE | COMMUNITY

## 2025-05-19 RX ORDER — LAMOTRIGINE 50 MG/1
1 TABLET TABLET, ORALLY DISINTEGRATING ORAL ONCE A DAY
Status: ACTIVE | COMMUNITY

## 2025-05-19 RX ORDER — LINACLOTIDE 72 UG/1
1 CAPSULE AT LEAST 30 MINUTES BEFORE THE FIRST MEAL OF THE DAY ON AN EMPTY STOMACH CAPSULE, GELATIN COATED ORAL ONCE A DAY
Qty: 90 | Refills: 3 | OUTPATIENT
Start: 2025-05-19 | End: 2026-05-14

## 2025-05-19 RX ORDER — ALENDRONATE SODIUM 35 MG/1
1 TABLET 30 MINUTES BEFORE THE FIRST FOOD, BEVERAGE OR MEDICINE OF THE DAY WITH PLAIN WATER TABLET ORAL
Status: ACTIVE | COMMUNITY

## 2025-05-19 RX ORDER — ZIPRASIDONE HYDROCHLORIDE 60 MG/1
1 CAPSULE WITH FOOD CAPSULE ORAL
Status: ACTIVE | COMMUNITY

## 2025-05-27 ENCOUNTER — TELEPHONE ENCOUNTER (OUTPATIENT)
Dept: URBAN - METROPOLITAN AREA CLINIC 23 | Facility: CLINIC | Age: 50
End: 2025-05-27

## 2025-06-03 ENCOUNTER — LAB OUTSIDE AN ENCOUNTER (OUTPATIENT)
Dept: URBAN - METROPOLITAN AREA TELEHEALTH 2 | Facility: TELEHEALTH | Age: 50
End: 2025-06-03

## 2025-07-01 ENCOUNTER — LAB OUTSIDE AN ENCOUNTER (OUTPATIENT)
Dept: URBAN - METROPOLITAN AREA TELEHEALTH 2 | Facility: TELEHEALTH | Age: 50
End: 2025-07-01

## 2025-07-10 ENCOUNTER — OFFICE VISIT (OUTPATIENT)
Dept: URBAN - METROPOLITAN AREA TELEHEALTH 2 | Facility: TELEHEALTH | Age: 50
End: 2025-07-10
Payer: COMMERCIAL

## 2025-07-10 ENCOUNTER — TELEPHONE ENCOUNTER (OUTPATIENT)
Dept: URBAN - METROPOLITAN AREA CLINIC 92 | Facility: CLINIC | Age: 50
End: 2025-07-10

## 2025-07-10 DIAGNOSIS — Z98.890 HISTORY OF FUNDOPLICATION: ICD-10-CM

## 2025-07-10 DIAGNOSIS — Z79.899 HIGH RISK MEDICATION USE: ICD-10-CM

## 2025-07-10 DIAGNOSIS — M13.80 SERONEGATIVE ARTHRITIS: ICD-10-CM

## 2025-07-10 DIAGNOSIS — Z71.6 TOBACCO ABUSE COUNSELING: ICD-10-CM

## 2025-07-10 DIAGNOSIS — F14.11 COCAINE ABUSE IN REMISSION: ICD-10-CM

## 2025-07-10 DIAGNOSIS — K71.9 DRUG-INDUCED LIVER DISEASE: ICD-10-CM

## 2025-07-10 DIAGNOSIS — F19.90 ALCOHOL USE DISORDER: ICD-10-CM

## 2025-07-10 DIAGNOSIS — F90.9 ATTENTION DEFICIT HYPERACTIVITY DISORDER (ADHD), UNSPECIFIED ADHD TYPE: ICD-10-CM

## 2025-07-10 DIAGNOSIS — Z90.710 HISTORY OF HYSTERECTOMY: ICD-10-CM

## 2025-07-10 DIAGNOSIS — G44.009: ICD-10-CM

## 2025-07-10 DIAGNOSIS — Z71.89 VACCINE COUNSELING: ICD-10-CM

## 2025-07-10 DIAGNOSIS — F10.91 ALCOHOL USE DISORDER IN REMISSION: ICD-10-CM

## 2025-07-10 DIAGNOSIS — K63.5 POLYP OF COLON, UNSPECIFIED PART OF COLON, UNSPECIFIED TYPE: ICD-10-CM

## 2025-07-10 DIAGNOSIS — K75.4 AUTOIMMUNE HEPATITIS: ICD-10-CM

## 2025-07-10 DIAGNOSIS — D80.2 IGA DEFICIENCY: ICD-10-CM

## 2025-07-10 DIAGNOSIS — R74.01 ABNORMAL/ELEVATED TRANSAMINASE (SGOT, AMINOTRANSFERASE): ICD-10-CM

## 2025-07-10 DIAGNOSIS — F33.1 MODERATE EPISODE OF RECURRENT MAJOR DEPRESSIVE DISORDER: ICD-10-CM

## 2025-07-10 DIAGNOSIS — E66.3 OVERWEIGHT: ICD-10-CM

## 2025-07-10 PROCEDURE — 99215 OFFICE O/P EST HI 40 MIN: CPT

## 2025-07-10 RX ORDER — DIAZEPAM 5 MG/1
1 TABLET AS NEEDED TABLET ORAL ONCE A DAY
Status: ACTIVE | COMMUNITY

## 2025-07-10 RX ORDER — LISDEXAMFETAMINE DIMESYLATE 50 MG/1
1 TABLET IN THE MORNING CAPSULE ORAL ONCE A DAY
Refills: 0 | Status: ACTIVE | COMMUNITY

## 2025-07-10 RX ORDER — VARENICLINE 0.03 MG/.05ML
1 SPRAY IN EACH NOSTRIL SPRAY NASAL TWICE A DAY
Status: ACTIVE | COMMUNITY

## 2025-07-10 RX ORDER — ALENDRONATE SODIUM 35 MG/1
1 TABLET 30 MINUTES BEFORE THE FIRST FOOD, BEVERAGE OR MEDICINE OF THE DAY WITH PLAIN WATER TABLET ORAL
Status: ACTIVE | COMMUNITY

## 2025-07-10 RX ORDER — URSODIOL 300 MG/1
1 CAPSULE CAPSULE ORAL
Qty: 180 | Refills: 0 | Status: ACTIVE | COMMUNITY
Start: 2025-03-30

## 2025-07-10 RX ORDER — ATORVASTATIN CALCIUM 20 MG/1
1 TABLET TABLET, FILM COATED ORAL ONCE A DAY
Status: ACTIVE | COMMUNITY

## 2025-07-10 RX ORDER — VORTIOXETINE 20 MG/1
1 TABLET TABLET, FILM COATED ORAL ONCE A DAY
Status: ACTIVE | COMMUNITY

## 2025-07-10 RX ORDER — LINACLOTIDE 72 UG/1
1 CAPSULE AT LEAST 30 MINUTES BEFORE THE FIRST MEAL OF THE DAY ON AN EMPTY STOMACH CAPSULE, GELATIN COATED ORAL ONCE A DAY
Qty: 90 | Refills: 3 | Status: ACTIVE | COMMUNITY
Start: 2025-05-19 | End: 2026-05-14

## 2025-07-10 RX ORDER — URSODIOL 300 MG/1
1 CAPSULE CAPSULE ORAL
Qty: 180 | Refills: 1
Start: 2025-07-04

## 2025-07-10 RX ORDER — LAMOTRIGINE 50 MG/1
1.5 TABLET, ORALLY DISINTEGRATING ORAL ONCE A DAY
Status: ACTIVE | COMMUNITY

## 2025-07-10 RX ORDER — BUDESONIDE, GLYCOPYRROLATE, AND FORMOTEROL FUMARATE 160; 9; 4.8 UG/1; UG/1; UG/1
2 PUFFS AEROSOL, METERED RESPIRATORY (INHALATION) TWICE A DAY
Status: ACTIVE | COMMUNITY

## 2025-07-10 RX ORDER — ZIPRASIDONE HYDROCHLORIDE 60 MG/1
1 CAPSULE WITH FOOD CAPSULE ORAL
Status: ACTIVE | COMMUNITY

## 2025-07-10 NOTE — HPI-TODAY'S VISIT:
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  June 26, 2025 that she delayed with coming rheumatology showing WBC 12 hemoglobin 15.2 MCV 93.8 platelet count 434 little up sodium 142 potassium 4.6 chloride 105 CO2 26 glucose 96 BUN of 12 creatinine 0.79 albumin 4.4 alk phos 140 AST 19 ALT 37 bilirubin 0.7 vitamin D level 44.9 C-reactive protein 0.04.    It does appear that she may be taking meloxicam 15 mg once a day as needed.  Also listed as being on alendronate 35 mg every 7 days, breztri Aerosphere 160/9/4 0.8 Flonase 50 mcg as needed, Humulin 70/30 5 units in the morning and 3 units with dinner, propranolol extended release 120 mg once a day, atorvastatin 20 mg a day, Valium 5 mg half a tablet as needed, lamotrigine 25 mg 2 tablets at night, Trintellix 20 mg daily, ursodiol 300 mg twice a day, trazodone 20 mg once a day, Ventolin inhaler as needed, Vyvanse 60 mg in the morning, Nurtec 75 mg disintegrating tablet as needed, citalopram 10 mg a day, methocarbamol 500 mg twice daily as needed, Botox as needed,       May 2025 labs showed glucose 73 BUN of 12 creatinine 0.93 sodium 138 potassium 4.4 albumin 4.7 bilirubin 0.9 alk phos elevated 173 AST 25 ALT 32 WBC 11.9 hemoglobin 14.7 MCV 93 platelet count 449 elevated.  Hep C antibody negative.  Vitamin D low at 27.  TSH 2.91.  A1c was slightly up at 5.8.  Cholesterol 175 in April, HDL 80 triglycerides 98 LDL 77.  January 2025 labs sent and showing WBC 8.7 hemoglobin 14.9 plate count 338 MCV 91 and important to note these are all normal and they have been off in November 7 with a WBC of 16.6 hemoglobin 16.6 and a plate count of 464.  Neutrophils were normal now at 5.4 and lymphocytes as well and that been elevated back in November at 13.9 suggesting some type of infection may have been playing a role in. Glucose was 95 BUN of 8 creatinine 0.97 sodium 129 potassium 4.1 and chloride 102 CO2 22 albumin 4.4 bilirubin was up to 1.9 from previous 0.9. Alk phos was 226 up from 174 in November with AST 20 and ALT 29 and previously AST 16 and ALT 19. Liver alk phos was 79% and bone 21% so this is definitely liver driven issue.  She says at time she went from 25 to 50mg lamictal and  noiw inc to 75mg lamictal.  Steroids shots to neck to help and not helping. RFA July 23. March  ultrasound was good to me. The liver is homogeneous in echotexture normal in echogenicity. Did not see any lesions. Common bile duct is 3 mm and this is normal. The right kidney is 10.1 cm. There is no ascites. Spleen about 7.4 cm. Overall they thought this was a normal exam.   Feb  labs are sent to us. The alkaline phosphatase isoenzymes show that 79% from the liver and 21% from bone. Therefore this is mainly coming from the liver. The creatinine 0.97, sodium 139, potassium 4.1, bilirubin 1.9, alkaline phosphatase 226, AST 20 and ALT 29. Goal for the AST and ALT is less than 25. White blood cells 8.7, hemoglobin 14.9, MCV 91 platelets 338. Previously the alkaline phosphatase was 174 and the AST was 16 and ALT was 19 back in November.   1/28/25 November 7 were sent in showing an alk phos elevated at 174 AST of 16 ALT of 19 and a bilirubin 0.9. There was also another labs from November 7 labs that I sent prior to you a note back in November 12 as well.  Those labs and these would suggest that we need to fractionate the alk phos to see if it is coming mainly from liver or bone or intestine source.  Nov 7 wbc 16.6 (wbc 7.3 pre and maybe recent steroid role) and hg 16.6 elevated (prior hg 14.8) and hct 49.3 and mcv 93. Platelets 464 elevated and prior 342. Neutrophils 13.9 little up and maybe from steroids.  Lamictal started in Oct to Nov.  Chol 171 and trg 147 and hdl 65 and ldl 81.  A1c 5.8.Free t4 1.27 and tsh 1.520.  Vitamin d 34 normal.   October 21 labs showed WBC 7.3 hemoglobin 14.8 platelet count 342 MCV 95 and neutrophils and lymphocytes normal at 4.2 and 2.1 respectively. Glucose normal at 89 and BUN of 9 creatinine 0.68 sodium 144 potassium 4.7. Chloride a little bit up to 107 and calcium level 10.3. Albumin on the vitamin D or calcium supplements lately? Albumin 4.5 and bilirubin normal at 1.0. The alk phos that was elevated back in May at 212 with the AST of 48 and ALT of 68 has improved now with the alk phos down to 161 and AST of 21 and ALT of 19.  The July 26 labs that showed an alk phos of 167 AST 25 and ALT of 31 so these labs are definitely a little lower than those but not back to baseline yet.    She is working for [employer]. Visiting family in [facility]. Oct 20 2024.  The September 5 labs sent the bilirubin was 1.0, alkaline phosphatase 260, AST 36, ALT 53. Previously back in July the alkaline phosphatase was 167 and the AST was 25 and the ALT was 31. These are higher.   No new meds. Need to redo when back 10-21.  Asked what was newer med or change and she says eye drops.  Aug 20 u.s liver was homogeneous in echotexture and common bile duct measures 2mm. Gallbladder views show post gb surgery status. No ascites. Right kidney 10cm and no hydronephrosis. Vessels open for liver and spleen and spleen normal at 7cm. They overall felt u.s was normal re parenchymal exam and echotexture and patent vessels and great to see as means liver is doing well and no obvious cumulative damage.   July 26 total protein 70 and alb 4.3 and tb 0.5 and direct bili 0.15 and alk 167 elevated and lower from 184. Ast 25 and alt 31 and prior ast 37 and alt 70.  June 25 labs show total protein 7.5, albumin 4.7, bilirubin 0.8 and direct bilirubin 0.2 and these are all normal. Your alk phos is coming down to 184 from 202 AST about the same at 37 from 25 and ALT went up to 70 from 60.  She says she had the doxepin that was removed 2-3 m and switched to valium and after the valium took temazepam and just stopped and back to valium.  May 23 labs show total protein 7.0 and alb 4.3 and tb 0.8 and direct 0.23 and alk 202 and prior 150 on their listed comparison but see below for more recent one. Ast 35 and alt 60.  Earlier May 2024 labs alk 212 and ast 48 and alt 68 and so  ast and alt seem to be lower now and also see a minimal drop seen in alk phos to 202.  May 2: Glucose 109 and elevated and not clear if fasting. Bun 14 and cr 0.77 and na 137 and k 3.9 and cl 103 and co2 21 and ca 9.8 and alb 4.2 and Tb 0.8 and alk 212 and prior 181 and so higher. Ast 48 and alt 68 also up and prior ast 18 and alt 28 so suggests a new medicine cause. Wbc 9.4 and hg 14.4 and hct 44 and mcv 95 and platelets 366. Neutrophils 5.6 and lymphs 2.6.   January 5, 2024 ultrasound finally was routed to your chart. Part of the problem was the name change (they has [place] listed) and  I think that threw them off even though the date of birth is the same. Pancreas was unremarkable. Liver was homogeneous/even in echotexture and with no discrete lesions. Liver vessels were patent as expected. Common bile duct was normal at 3 mm. Right kidney was 10.2 cm with no hydronephrosis. Spleen normal at 7.5 cm. Splenic vessels patent.  January 2 labs show WBC normal at 8.8 hemoglobin 15.2 platelet count 376 MCV 94. Neutrophils and lymphocytes both normal. Glucose slightly low at 66 with normal for that lab being 70-99. BUN of 10 creatinine 0.78 sodium 143 potassium 4.3 calcium 10.2 albumin 4.6 bilirubin 0.5 and only your alkaline phosphatase was elevated at 181. AST was normal at 18 and ALT was normal at 28. Prior labs that you did in September 18 had shown your alk 172  AST 23 and ALT 35 so these appear to be lower now than then. Unclear why you are alk phos is isolated up and we may want to talk about fractionating your alkaline phosphatase. It is still possible that a medication could be causing that to be off as well so lets go over your medicine list at the upcoming visit and review these options with you.  Trintellix started in Dec and remains on it.  Oct 20 2023 glu 129 and bun 9 and cr 0.9 and na 140 and k 5.0 and cl 101 and co2 23 and ca 10.3 and alb 4.6 and tb 0.9 and alk 149 and ast 23 and alt 39.   September 18 sent in. Glucose was slightly up at 102 BUN of 14 creatinine 0.86 sodium 140 potassium 4.7 calcium 10.2 albumin 4.4 bilirubin 0.4 alk phos elevated at 172 and back in September 11 of been 167. Your AST was higher at 23 and ALT higher at 35 and previous in September 11 your AST was 13 and ALT of 17. Sed rate was noted to be 2 and C-reactive protein was 1 and PTH was 33. September 11 labs show WBC 7.7 hemoglobin elevated 16.7 plate count 423 MCV 93 with a BUN of 9 creatinine 0.96 sodium 142 potassium 5.2 elevated calcium of 10.6. AST then again 13 and ALT of 17 alk phos 167. Cholesterol was up at 270 and triglycerides were up at 378 and HDL 48 and  so those were definitely off. INR normal at 1.0. B12 normal at 637 and folate normal at 9.9. Hemoglobin A1c slightly up at 5.8 so you need to watch that as you may be moving into prediabetes. TSH 1.67. Uric acid was 3.2. For comparison back in February your AST was 25 and ALT 29 alk phos 161. Very important to see what are you doing differently between September 11 and the September 18 labs.  Full labs from July 22 sent in. You read me the essential ones but the full labs showed glucose 72 BUN of 8 creatinine 0.64 sodium 137 potassium 4.2 chloride 106 CO2 of 22 BUN 4.4 bilirubin 0.7 alk phos elevated at 172 AST 24 and ALT 20. Back in May your alk phos of been 157 so that appears to be a little higher. TSH 0.76. WBC 7.1, hemoglobin up at 16.6 hematocrit up at 50.8 plate count 436 elevated MCV 99. Neutrophils 3479 lymphocytes 2918. Hep B core total negative. RPR negative. HIV test negative. Hep B surface antigen negative. Hep C antibody negative. July 22 drug screen had been positive for alcohol metabolites, amphetamine, and marijuana.  She is 9 m without any alcohol.  August 30 ultrasound shows liver echogenicity to be normal with no focal lesions. Common bile duct was normal at 3 mm. Prior cholecystectomy changes were seen. No ascites was seen. Visualized pancreas portions appeared unremarkable. Liver vessels were patent. Spleen was normal at 7.4 cm. Right kidney 10.2 cm with no hydronephrosis. In summary you have a normal-appearing liver and patent vessels.  Prior cholecystectomy changes were seen.  You mentioned she justy left rehab x 28 days and she says sent to [facility].  July 22: last ones: ast 24 and alt 20 and alk 172 up and hg 16.6 cr 0.64. She [facility] to [facility].  She has been off the alcohol now 41 day.  Labs may be better now as further than the last labs.  Pt last visit had beenm having some alcohol but she has cut back some and she is following with therapy and having 8 ounces and day and if have more than 2-3 drinks a day at risk for cirrhosis,  May 11 2023 glu 70 and and bun 11 and cr 0.68 and na 136 and k 4 and cl 99 and co2 26 and ca 10.3 and alb 4.6 and tb 1.1 and alk 157 and ast 34 and alt 60 and mg 2.1 and tsh 1.10 and wbc 9.3 and hg 15.9 and hct 45.5 and platelet 362, mcv 93.4  neutrophils 6371 and lymphs 2018.[id number] and folate 14 vit d 78 and ethyl alcohol and over [id number].  Re meds: none in rehab and stratera 60mg qd and 40mg escitalopram and geodon 60mg po qd.  February 6 showed that the pancreas was unremarkable, the liver was homogeneous/even in echotexture with no discrete lesions. Liver vessels were patent. Common bile duct was normal at 2 mm.  Right kidney was 10.8 cm with no hydronephrosis. Spleen was normal at 7.9 cm with splenic vessels patent.  Pt says she was drinking more in 2020 and not until this past year. 6m of this,  Smoking 1/2 ppd and need to work on this.  She says mother is doing study with [facility]. Doing a clinical study for her lungs and for chronic bronchitis.  She says she had labs done last week. Rheum saw.  2-7-2023 and wbc 8.6 hg 15 and plat 387 and neutrophils 4.9 and lymph 2.7 and gly 89 and bun 11 and cr 0.85 and na 140 and k 4.2 and cl 99 and co2 24 and alb 4.7 and tb 0.9 and alk 161 and ast 25 and alt 29.  Alcohol could not sense with the vyvance.  She was changed from adderall to vyvanse and maybe for 3m. She is on ursodiol 300mg po tid.  9/14/22 labs were sent to us. White blood cell count 10.8, hemoglobin 13.2, MCV 95, platelets 337, lymphocytes 2.3, neutrophils 7.3 which is slightly elevated please make sure your primary care doctor is aware. Glucose 98, creatinine 0.65, sodium 142, potassium 4.1, bilirubin 0.5, alkaline phosphatase 146, AST 17, ALT 35. The alkaline phosphatase and the AST are trending in the right direction. Slight increase in the alt, previousl it was 31. We will continue to monitor this. Also of note the protein was slightly low at 5.9 , please share with your primary care.  Aug 5: glu 93 and bun 6 and cr 0.92 and na 141 and k 4.3 and cl 102 and co2 23. Wbc 10.4 and hg 14.6 and hct 44.3 and plat 404.  Mcv 95. Neutrophils 7.3 little up and prior normal 6.8. Lymphs 2.1. Alb 4.4 and tb 0.8 and db 0.22 and alk 150 ast 27 and alt 23.  Prior June 27: alk 183 and ast 18 and alt 31. So labs trending right way with some variation in ast and alt but still in normal range for ast and alt. No clear acute issues.  We will contact [facility] for the u.s done feb 3.  Aug 5  ultrasound shows liver normal in contour and echogenicity.   No suspicious liver lesions. Common bile duct was normal at 2.4 mm and gallbladder absent. Right kidney 9.6 cm with no hydronephrosis. Spleen normal at 8.6 cm. Imaged pancreas portions unremarkable with tail partially not well seen due to gas. Liver vessels patent. Overall good to see that there was no focal lesions and no suspicion for any fibrotic morphology.  She started to drink again and she is having like tequila and has 1 ounce and doing 6 ounces on a rare day and 3 ounces a day. Started 1 month ago.  Pt says labs in Jan 2022 were up and she was ordered a medicine that had diclofenac and Geodon in Jan. She is stil on the geodon at 40mg.  June 27 labs show glucose normal at 86 BUN of 10 creatinine 0.93 down from 0.94.  Sodium 141 potassium 3.9 chloride 103 CO2 24. White blood count still up at 11 but down from 13.5 with.  Hemoglobin 13.2 which is stable.  Platelet count normal at 363 from 411.  MCV normal at 92.  Neutrophils normal at 6.8 and lymphocytes 3.0. Albumin 4.1 bilirubin 1.1 direct bilirubin 0.3.  Alk phos down to 183 from 213.  AST down to 18 and ALT down to 31 from previous AST 17 and ALT 35.  Ideal ALT is less than 25.   June 8 labs showed glucose elevated at 123 and previously was normal but has occasionally been elevated as back in May 3 it was 108.  Please share with primary provider.  Creatinine down to 0.94 from 1.15 so doing better there.  Sodium 138 potassium 4.2 chloride 103 CO2 of 22. White blood  cell count up at 13.5 from 10.3.  Were you ill recently?  Hemoglobin 13.2 platelet count down to normal at 411 from 452.  Neutrophils up at 10.8 and again wonder if he had a recent infection or vaccine?  Lymphocytes 2.1 normal. Albumin 4.5 bilirubin 0.6 which is stable direct bilirubin 0.2.  Alkaline phosphatase slightly lowered to 13 from 220 AST low at 17 from 21 previously 38.  ALT still slightly up at 35 but previously 35 and back on May 3 have been 65. Overall liver labs are dropping but slowing down on that. Any new meds? Maybe role of recent illness?  May 19 labs show glucose down to 97 from 108 before.  BUN of 10.  Creatinine still remains slightly up at 1.15 from 1.13.  He to stay hydrated with the increasing temperatures. Sodium 135 potassium 4.7 chloride 96 CO2 of 21. White blood cell count 10.3 hemoglobin 14.1 platelet count slightly up at 452 and was previously 430.  MCV 92.  Neutrophils 6.4 lymphocytes 2.7 normal. Albumin 4.6 bilirubin 0.6 which is down from 1.0 and prior 1.4.  Direct bilirubin 0.21.  Alk phos remains at 220 was previously 220 and prior 180.  AST down to 21 and previously 38.  ALT down to 35 from previously 65.  May 3 labs show glucose slightly up at 108 and may be not fasting that day?  BUN of 12 creatinine elevated at 1.13 previously 1.06.  Sodium 139 potassium 4.5 chloride 101 CO2 21.  The chloride and CO2 are back to normal which they were off last time. White blood cell count down to 10 hemoglobin 14.8 platelet count 430 MCV 90 neutrophils normal at 6.5 lymphocytes normal now at 2.5. Albumin 4.7 also down from 5.0.  Bilirubin normal now at 1.0 alkaline phosphatase still elevated though at 220 previously was 180.  AST was 38 and ALT 65.  Previously AST 27 and ALT 44. Called pt and no new meds. Did drop citalopram to 10mg po qd and she is now off. Soumya 300mg po tid for 2 weeks or so. Asked if gaining weight and she is not. Smoking a lot of cigarettes and I have seen that before but not common to see. She will work on this and see how she does. She will try to work on that. Redo the labs in 2 weeks.  She stopped her lisinopril and changed to amlodipine and did not start as bp is low. 95/71.  January 19, 2022 ultrasound sent in. Liver appeared to be increased abd coarsened in regards to echogenicity.  No definite liver lesions.  No dilated ducts. Normal hepatopetal flow seen in the main portal vein.  Imaged hepatic veins also were patent. Common bile duct normal at 5 mm. Gallbladder absent. Right kidney 10.6 cm with no hydronephrosis. Left kidney 10.5 cm with no hydronephrosis.  Spleen normal at 8.7 cm. They suspected that the liver could have moderate diffuse fatty infiltration.  I would state that this was also at the time that you are having a medication effect so it could be that threw that reading off.     April 4, 2022 labs show glucose 93 which is normal BUN of 9 creatinine slightly up at 1.05 and was previously 1.01. May want to look at your hydrational state. Sodium 139 potassium 4.7 chloride 99 CO2 of 22. Albumin 4.8 bilirubin 1.2 which is normal and direct bilirubin 0.28 suggesting that you have Gilbert's syndrome or that indirect hyperbilirubinemia that is benign and present about 10% of people. Alkaline phosphatase down to 187 from 202 and down previously from 230.  AST of 24 down from 36 and prior 49. ALT 33 down from 41 and ALT 76.  She had a prior bump in lfts from working on epoxys.  Making cups.  She also asked re asa and crestor and  she has not started the aspirin yet and she was to start to crestor and she will start post the next labs.  March 21 labs show glucose still slightly up at 104 previously 106.  BUN of 7 and creatinine down to 1.01 from 1.19 so that was better.  Sodium 136 potassium 4.2 chloride 101 CO2 slightly low at 18. Total bilirubin normal at 1.1 previously 1.2 direct bilirubin 0.29 and I would suggest that you have Gilbert's.  Gilbert's as you recall is that indirect hyperbilirubinemia that is very common and present about 10% of people. The alkaline phosphatase is down to 202 from 230 and the AST is down to 36 from 49 and ALT 41 from 76.  It would appear that you change something? White blood cell count 9.7 hemoglobin 14.1 platelet count 444 down from 499. Important to note the white cell count also came down from 13.4 to 9.7.  Neutrophils 6.8 and lymphocytes 2.1 both normal. Called pt re the labs. She says did not change meds. 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   March 9 labs show white blood cell count 13.4 up from 11.7 and prior 10.7.  Hemoglobin 14.6.  Platelet count up a little at 499 from 445.  It has been variable before though.  MCV normal at 90.  Neutrophils up at 9.5 and lymphocytes 2.5 down from last time. Glucose 106 slightly up and BUN of 9 creatinine slightly higher at 1.9 and previously 1.15.   Sodium 137 potassium 4.1 chloride 100 CO2 slightly low at 19.  Albumin 4.0.  Total bilirubin 1.2 normal.  Direct bilirubin 0.35.  Alkaline phosphatase up to 233.  AST 49 and ALT 76 and previously AST 29 ALT 40. Called pt as labs trending up. Meds: propanol, furosemide, spirinolactone, basagalar, Spiriva and Symbicort, Geodon and lamotrigine. Ursodiol. Lamotrigine 1% risk for incl fts and on for a year. Geodon: category c for liver injury and occurs within 1-4 weeks and she has been on it for 6-8 weeks. She will discuss with psych re this.   March 3 labs show liver alkaline phosphatase fraction is 76% bone fraction is 24% and intestinal 0%  so clearly the alkaline phosphatase is coming mainly from liver.  This is the normal pattern that one would expect. March 3 labs also show albumin slightly up at 4.9 from 4.7.  Could reflect hydrational status. Total bilirubin normal at 1.2 and direct bilirubin 0.32 suggesting a Gilbert's syndrome which is a benign finding. Alkaline phosphatase remains elevated at 203 and previously was 175.  AST 29 and ALT 40 and previously AST 20 and ALT 34. Unfortunately these labs arrived very late so we need to go over with you what medicines you are on and since when.  They are not dramatically worse but they are definitely drifting up.  February 24 labs show white blood cell count elevated at 11.7 previously was 10.7.  Hemoglobin 14.5 normal platelet count 445 down from 468.  MCV 92.  Neutrophils elevated at 7.1 and lymphocytes 3.4.  Have you been ill?  Glucose 107 BUN of 12 creatinine 1.15 down from 1.26.  Normal is from 0.57 up to 1.0 so still elevated.  Sodium 137 k 3.8 chloride 98 CO2 of 22.  Albumin 4.7 normal now.  Limit 0.8 direct bilirubin 0.18 and alkaline phosphatase elevated at 175.  Previously 107.  AST 20 and ALT 34.  Previously AST 26 and ALT 30.  Need to watch this evolving alkaline phosphatase trend. Called pt: she had surgery on the right elbow and so this could be from bone and had moved ulnar nerve. She also is on some prednisone and she is on prednisone 2.5mg po qd. We need to fractionate the alkaline phosphatase. We can redo the labs next week to check on that.  February 2 labs show white blood cell count 10.7 hemoglobin 14.4 platelet count up at 468.  MCV was 90 which is normal.  Neutrophils 6.3 and lymphocytes elevated at 3.3. Glucose slightly up at 102 BUN of 13 creatinine elevated at 1.26 sodium 139 potassium 3.9 chloride 99 CO2 of 22 albumin elevated at 4.9 bilirubin 0.8 direct bilirubin 0.21 alkaline phosphatase 107 AST 26 and ALT 30 so clearly that must have been that herbal smooth product that you were on and the other herbals and we can reassess from there. Called pt: she had ketorolac also. Start the ursodiol 300mg po bid with food. She will do that and follow labs.  Started atorvastatin since dec 2020 and she is still on that for now and then to do the crestor,  January 4 local labs sent in. Glucose 159 elevated please share with primary provider.  He had a 16 creatinine 0.96 sodium 138 potassium 4.2 chloride 99 calcium 9.9 total bili 0.4 alkaline phosphatase 202 and . Prior December 29 labs showed alk phos 114 AST 28 and ALT 45 so clearly those numbers bumped. White blood cell count 8.4 hemoglobin 13.2 platelet count 350. Cholesterol 226 elevated.  Triglyceride 284 elevated.   elevated.  Hemoglobin A1c slightly better at 7.9 from 8.1%.  TSH 1.78.  Magnesium 2.2. Called pt: need new labs to see what labs doing.  Dec 29 labs: ast 48 and alt 31 and alk 111 and tb 0.4.  December 13 2021  labs show white blood  cell count elevated 11.9 but down from 12.6.  Hemoglobin 15 and platelets up at 503 and previously 394.  That can sometimes go up with inflammation or medicines.  Please be sure to share this info with primary providers. MCV normal at 90.  Neutrophils elevated 7.4 but down from 10.1 back on December 2.  Lymphocytes normal at 3.1 and monocytes slightly up at 1.0. Glucose elevated at 149 and was previously 114.  Hemoglobin A1c up to 8.1 and was previously 6.3.  You should look at this sugar issue again with your primary provider as that is unusual for you. BUN of 12 creatinine 1.19 which is up from 1.014 sodium 136 potassium 4.2 chloride 98 CO2 20. Calcium at 10.4 and was previously normal at 9.6 and I wonder if it is medicine related? Albumin 4.6 bilirubin 0.8 alkaline phosphatase slightly higher at 130 previously 126. AST down to 32 and ALT down to 49 previously AST 65 and ALT 66. C-reactive protein noted to be normal at 2.  Dec 1 labs much better: Tp 6.9 and alb 4.2 and tb 0.2 and db 0.10 and alk 111 and ast 19 and alt 43 and down from prior alk 301 and ast 77 and alt 171. So doing much better. Keep track of any new meds added or removed.  November 98 labs show 123 which is elevated BUN of 18 creatinine 0.99 sodium 137 potassium 5.1 chloride 99 CO2 of 24 albumin 4.7 bilirubin 0.5 alkaline phosphatase down to 118 from 194.  AST 29 and previously 21 ALT down to 46 from 53.  So clearly centimeters drop. She says she had increased the Seroquel and then being weaned on it now. She is now on 50 mg of Seroquel and she is to stop it tonight and she will redo labs in 1-2 weeks to be sure.  Ca 10.3 little up and she is not on supplements for this and follow course. She will let primary md know.  Oct 12: alb 4.5 and tb 0.6 and db 0.19 and alk 301 and ast 77 and alt 171 and prior ast 33 and alt 52. went to 25 and 50 and now 100mg.  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 Says taking dilaudid and ibuprofen for that. She says taking a dilaudid occ and one ibuprofen. 10-1 labs also off. Still on steroids 10mg po qd for the rheum arthritis so enbrel stopped working and went to this. She did myasthenia screen and neg and started on this and trying to do humira sunday.   10-1 alk 199 and ast 56 and alt 149.   September 21 ultrasound showed the liver to be 15.6 cm with mild hepatic steatosis but no focal mass. Liver vessels are noted to be patent. Prior cholecystectomy changes seen. No bile duct dilation seen with common bile duct 5 mm. Visualized pancreas portions were unremarkable proportions of the head and tail the pancreas were not seen due to gas. Right kidney 10.6 cm and left kidney 10.8 cm with no hydronephrosis. Spleen normal at 8.5 cm. Mild liver fat overall was seen patent vessels were seen. No ascites was seen.  September 21 labs show albumin 4.9 which remains slightly up bilirubin 0.4 which is down from 0.8 direct bilirubin 0.13 which is down from 0.23. Alkaline phosphatase down to 198 from 207 AST slightly up at 33 from 28 ALT 52 down from 66.  September 9 redo labs show albumin stable 4.9 bilirubin 0.8 direct 0.23 alkaline phosphatase slightly lower at 207 from 215. AST down to 28 from 61. ALT down to 66 from 86.  So it does appear the labs are dropping with lamictal drop 150 to 100mg and then went up on seroquel. Sept 7 labs: alb 4.8 and tb 0.7 and db 0.2 and alk 215 and Ast 61 and alt 86.  Prior aug 27 albs ast 18 and alt 28 and alk 135. that was pre lamictal changed.  She had prior drinking green tea for a few weeks. She has an immune issue and so the labs spiked up. then stopped and labs settled last year off that.  Pt was to do a bx and trouble breathing and Dr Self told to do barium swallow and manometry and did the barium and to do the manometry and says told was ok.  Ent saw some plaques and she had bx done.  Pelvic area pain noted and se started with pain and went to er and went [facility] [place] to [facility] and she said cyst in fallopian tube and ruptured and did a full hysterectomy and that was July 28 2021.  Ex  burned her citizenship certificate. She said she applied to get it. [phone] for that.  Enbrel started earlier for seronegative RA.  She says needed onc re wbc up and asked him to do the lfts and she says part of labs back and she has from aug 8 2021. Ast and alt done and alk 209 and prior 138 and ast 149 and alt prior 34 and alt 231 and 76 july 19. b12 1804 and IGA 68 little low.  May 3:wbc 9.6 hg 13.7 and plat 355 and mcv 93. Neutrophils 6.5. glu 92 and cr 0.95 and na 137 and k 4.3 and ca 9.8 and alb 4.5 and tb 0.3 and alk 177 elevated and ast 30 and alt 70 and tesosterone level 6 normal per chart for your age. Prior alk 277 and ast 126 and alt 315. So came down post that doxycycline exposure.  April 6, 2021 [facility]s at [facility]: pancreas appears normal where seen and liver demonstrates normal size and contour and echogenicity. Liver vessels patent. Gallbladder absent. Common bile duct 4mm. No hydronephrosis seen to kidneys. Spleen 8.4cm. No liver mass seen.  Jan 11: glu 171 elevated and show local md. cr 1.06 and na 137 and k 4.0 and cl 98 and co2 21 and ca 9.7 normal and alb 4.6 and tb 0.7 and alk 88 and ast 13 and alt 16. So liver labs doing very well. A1c 5.9% noted.  Jan 20 2021: ct no cardiomegaly. Clear lung bases. Unremarkable liver. Prior gb surgery changes. Unremarkable spleen. Pancreas and and adrenal glands normal. cyst right kidney. left renal cyst. Mild rectosigmoid wall thickening. Liver vessels patent. No bulk adenopath.  Nov 9 2020 labs: wbc 13. 4hg 14 and plat 453 elevated slightly (150- 450 normal). na 135 k 4.6 and cl 97 and co2 26 and calcium elevated 11.0.  Show to local md: why is calcium up?alb 4.5 and tb 0.6 and alk 165 elevated and ast 22 and alt 36 so back down. chol 336 elevated and trg 307 and ldl 219. Lipids off and not sure if due to recent liver flare or other and may want to redo that as known if liver flared that this can be off. Vit d low 28.8. a1c 5.9%.  She says has had low vit d and she was to start this and she was started on 5000 a day for 4 m and she had been taking some vit d. She says she had stopped it for surgery July and restarted it again.  She says was on doxycycline and said had a prior hx of tcn reaction and filled and she said she may have been more ill from it. No reexposure.  Nov 3 2020 : ast 50 and alt 121 (both down then from 121 and 222). main she is off green tea and the lamictal also off prior. Suspect green tea was immune stimulant reving up your immune system. She has a very active immune system.  Rheum says she has fibromyalgia and doing water therapy.  11-2-20: ast down to 50 and alt 118 and prior 121 and alt 222 . alk 213 still up from 210 and tb down to 0.4 from 0.5.  Oct 26 labs tb 0.5 and alk 210 and ast 121 and alt 222. alk 248 and so now 210 so that is better, and ast 128 and that is now 121 and alt was 248 and now 222 so better and we need to follow.  Oct 19 labs spiked again: alk 198 and ast 128 and alt 248. Tb 0.5. na 136 and k k 4.5. bun 15 and cr 0.94 and glu 112.   Oct 6 ast 16 and alt 25 and alk 111. Prior she was also on abx for 6 weeks and so finished oct 8 or so and so that not the cause.  Oct 6: wbc 11.2 little up, hg 14.4 and hct 42.2, plat 141. Mcv 90, glu 103 little up and maybe not fasting. Bun 20 and cr 0.98 and na 135 and k 4.5, cl 99 and co2 22. Alb 4.2 and tb 0.5 and alk 111 and ast 16 and alt 25.  Prior visit lamictal came down from 75/50 to 50/25 as of oct 9 and added gabapentin 400mg and seroquel 25mg po qhs an atorvastatin 40mg po qhs.  Off the prior nasal exposures also.  Aunt Ewelina passed in oct 2020 had oltx and she had failed from 2nd tx. Not from covid 19.  U.s aug 2020 [facility]: they saw mild fatty infiltration of the liver. Prior gb surgery changes seen. Visualized pancreas portions normal. No liver masses. Common duct 4mm. Portal vein patent. Kidneys unremarkable. Spleen 7.8cm unremarkable. Should get better as time passes from the issues we discussed.  Aug 4 2020 labs: glu 61 and little low bun 17 and cr 0.91 and na 135 and k 5.2 and cl 96 and co2 20 and ca 10.9 elevated and show local md. alb 4.7 and tb 0.6 and alk 104 and ast 24 and alt 28.  She was on ursodiol for has been on it for her liver started july 29 and she did stop it . She is staying off this as last time ok and may need it.  Nov 25 2020: wbc 11.3 and hg 14.1 plat 484 elevated and neutrophils 7.7 and glu 144 elevated and cr 1.08 little up.na 139 and k 4.3 and cl 99 and co2 24 and ca 10 and alb 4,7 and tb 0.3 and alk 130 and ast 15 and alt 18. Addendum: 1. Smoothie immune booster did. 2. Michelle took that. 3. She will confirm medicine.  Last time had been down 120 and now 110 in aug.  Addendum: le for a few weeks pre labs in sept: Serum aminotransferase elevations above 3 times the upper limit of normal occurred in 2% to 4% of patients treated with cariprazine in preregistration studies compared with 0.7% to 2% of placebo recipients. Elevations above 5 times ULN were rare <1% and no patient developed clinically apparent liver injury with jaundice or symptoms. Nevertheless, an occasional patient was withdrawn from therapy because of serum aminotransferase elevations usually arising within the first month of treatment and resolving rapidly with drug discontinuation. Since its approval and more widescale use, there have been no published cases of clinically apparent liver injury although the product label lists hepatitis as a possible adverse side effect. Likelihood score: E* (unproven but suspected rare cause of clinically apparent liver injury).  Plan: 1. Pt will do labs to follow meds but alk phs is dropping. 2. Pt will do labs in  1m and 3m after new meds. 3. Pt will do u.s in march 2026. 4. Call if new meds to be on the lookout.   Duration of visit was 44 minutes with 20 minutes spent prepping chart and loading info for the visit to [facility] records and checking [facility] insights for updated info  and then 24  additional minutes for this [facility] telemed visit reviewing chart and events and plans with the pt.

## 2025-08-01 ENCOUNTER — TELEPHONE ENCOUNTER (OUTPATIENT)
Dept: URBAN - METROPOLITAN AREA CLINIC 23 | Facility: CLINIC | Age: 50
End: 2025-08-01

## 2025-08-01 ENCOUNTER — CLAIMS CREATED FROM THE CLAIM WINDOW (OUTPATIENT)
Dept: URBAN - METROPOLITAN AREA SURGERY CENTER 15 | Facility: SURGERY CENTER | Age: 50
End: 2025-08-01
Payer: COMMERCIAL

## 2025-08-01 DIAGNOSIS — R13.19 OTHER DYSPHAGIA: ICD-10-CM

## 2025-08-01 DIAGNOSIS — Z98.890 HISTORY OF NISSEN FUNDOPLICATION: ICD-10-CM

## 2025-08-01 DIAGNOSIS — B37.81 CANDIDA: ICD-10-CM

## 2025-08-01 DIAGNOSIS — E11.9 DIABETES MELLITUS WITHOUT COMPLICATION: ICD-10-CM

## 2025-08-01 DIAGNOSIS — Z86.0100 PERSONAL HISTORY OF COLONIC POLYPS: ICD-10-CM

## 2025-08-01 DIAGNOSIS — B37.81 CANDIDAL ESOPHAGITIS: ICD-10-CM

## 2025-08-01 DIAGNOSIS — K62.1 ANAL AND RECTAL POLYP: ICD-10-CM

## 2025-08-01 DIAGNOSIS — D12.2 ADENOMA OF ASCENDING COLON: ICD-10-CM

## 2025-08-01 DIAGNOSIS — K63.5 COLON POLYPS: ICD-10-CM

## 2025-08-01 DIAGNOSIS — Z86.0100 HISTORY OF COLON POLYPS: ICD-10-CM

## 2025-08-01 PROCEDURE — 45380 COLONOSCOPY AND BIOPSY: CPT | Performed by: INTERNAL MEDICINE

## 2025-08-01 PROCEDURE — 43239 EGD BIOPSY SINGLE/MULTIPLE: CPT | Performed by: INTERNAL MEDICINE

## 2025-08-01 PROCEDURE — 00813 ANES UPR LWR GI NDSC PX: CPT | Performed by: NURSE ANESTHETIST, CERTIFIED REGISTERED

## 2025-08-01 PROCEDURE — 45385 COLONOSCOPY W/LESION REMOVAL: CPT | Performed by: INTERNAL MEDICINE

## 2025-08-01 PROCEDURE — 43248 EGD GUIDE WIRE INSERTION: CPT | Performed by: INTERNAL MEDICINE

## 2025-08-01 RX ORDER — ZIPRASIDONE HYDROCHLORIDE 60 MG/1
1 CAPSULE WITH FOOD CAPSULE ORAL
Status: ACTIVE | COMMUNITY

## 2025-08-01 RX ORDER — VORTIOXETINE 20 MG/1
1 TABLET TABLET, FILM COATED ORAL ONCE A DAY
Status: ACTIVE | COMMUNITY

## 2025-08-01 RX ORDER — DIAZEPAM 5 MG/1
1 TABLET AS NEEDED TABLET ORAL ONCE A DAY
Status: ACTIVE | COMMUNITY

## 2025-08-01 RX ORDER — ATORVASTATIN CALCIUM 20 MG/1
1 TABLET TABLET, FILM COATED ORAL ONCE A DAY
Status: ACTIVE | COMMUNITY

## 2025-08-01 RX ORDER — LAMOTRIGINE 50 MG/1
1.5 TABLET, ORALLY DISINTEGRATING ORAL ONCE A DAY
Status: ACTIVE | COMMUNITY

## 2025-08-01 RX ORDER — LISDEXAMFETAMINE DIMESYLATE 50 MG/1
1 TABLET IN THE MORNING CAPSULE ORAL ONCE A DAY
Refills: 0 | Status: ACTIVE | COMMUNITY

## 2025-08-01 RX ORDER — BUDESONIDE, GLYCOPYRROLATE, AND FORMOTEROL FUMARATE 160; 9; 4.8 UG/1; UG/1; UG/1
2 PUFFS AEROSOL, METERED RESPIRATORY (INHALATION) TWICE A DAY
Status: ACTIVE | COMMUNITY

## 2025-08-01 RX ORDER — VARENICLINE 0.03 MG/.05ML
1 SPRAY IN EACH NOSTRIL SPRAY NASAL TWICE A DAY
Status: ACTIVE | COMMUNITY

## 2025-08-01 RX ORDER — LINACLOTIDE 72 UG/1
1 CAPSULE AT LEAST 30 MINUTES BEFORE THE FIRST MEAL OF THE DAY ON AN EMPTY STOMACH CAPSULE, GELATIN COATED ORAL ONCE A DAY
Qty: 90 | Refills: 3 | Status: ACTIVE | COMMUNITY
Start: 2025-05-19 | End: 2026-05-14

## 2025-08-01 RX ORDER — URSODIOL 300 MG/1
1 CAPSULE CAPSULE ORAL
Qty: 180 | Refills: 1 | Status: ACTIVE | COMMUNITY
Start: 2025-07-04

## 2025-08-01 RX ORDER — ALENDRONATE SODIUM 35 MG/1
1 TABLET 30 MINUTES BEFORE THE FIRST FOOD, BEVERAGE OR MEDICINE OF THE DAY WITH PLAIN WATER TABLET ORAL
Status: ACTIVE | COMMUNITY

## 2025-08-07 ENCOUNTER — OFFICE VISIT (OUTPATIENT)
Dept: URBAN - METROPOLITAN AREA CLINIC 23 | Facility: CLINIC | Age: 50
End: 2025-08-07

## 2025-08-07 RX ORDER — URSODIOL 300 MG/1
1 CAPSULE CAPSULE ORAL
Qty: 180 | Refills: 1 | Status: ACTIVE | COMMUNITY
Start: 2025-07-04

## 2025-08-07 RX ORDER — ZIPRASIDONE HYDROCHLORIDE 60 MG/1
1 CAPSULE WITH FOOD CAPSULE ORAL
Status: ACTIVE | COMMUNITY

## 2025-08-07 RX ORDER — ALENDRONATE SODIUM 35 MG/1
1 TABLET 30 MINUTES BEFORE THE FIRST FOOD, BEVERAGE OR MEDICINE OF THE DAY WITH PLAIN WATER TABLET ORAL
Status: ACTIVE | COMMUNITY

## 2025-08-07 RX ORDER — VORTIOXETINE 20 MG/1
1 TABLET TABLET, FILM COATED ORAL ONCE A DAY
Status: ACTIVE | COMMUNITY

## 2025-08-07 RX ORDER — VARENICLINE 0.03 MG/.05ML
1 SPRAY IN EACH NOSTRIL SPRAY NASAL TWICE A DAY
Status: ACTIVE | COMMUNITY

## 2025-08-07 RX ORDER — BUDESONIDE, GLYCOPYRROLATE, AND FORMOTEROL FUMARATE 160; 9; 4.8 UG/1; UG/1; UG/1
2 PUFFS AEROSOL, METERED RESPIRATORY (INHALATION) TWICE A DAY
Status: ACTIVE | COMMUNITY

## 2025-08-07 RX ORDER — LAMOTRIGINE 50 MG/1
1.5 TABLET, ORALLY DISINTEGRATING ORAL ONCE A DAY
Status: ACTIVE | COMMUNITY

## 2025-08-07 RX ORDER — LINACLOTIDE 72 UG/1
1 CAPSULE AT LEAST 30 MINUTES BEFORE THE FIRST MEAL OF THE DAY ON AN EMPTY STOMACH CAPSULE, GELATIN COATED ORAL ONCE A DAY
Qty: 90 | Refills: 3 | Status: ACTIVE | COMMUNITY
Start: 2025-05-19 | End: 2026-05-14

## 2025-08-07 RX ORDER — ATORVASTATIN CALCIUM 20 MG/1
1 TABLET TABLET, FILM COATED ORAL ONCE A DAY
Status: ACTIVE | COMMUNITY

## 2025-08-07 RX ORDER — DIAZEPAM 5 MG/1
1 TABLET AS NEEDED TABLET ORAL ONCE A DAY
Status: ACTIVE | COMMUNITY

## 2025-08-07 RX ORDER — LISDEXAMFETAMINE DIMESYLATE 50 MG/1
1 TABLET IN THE MORNING CAPSULE ORAL ONCE A DAY
Refills: 0 | Status: ACTIVE | COMMUNITY

## 2025-08-20 ENCOUNTER — OFFICE VISIT (OUTPATIENT)
Dept: URBAN - METROPOLITAN AREA CLINIC 23 | Facility: CLINIC | Age: 50
End: 2025-08-20

## 2025-08-21 ENCOUNTER — TELEPHONE ENCOUNTER (OUTPATIENT)
Dept: URBAN - METROPOLITAN AREA CLINIC 23 | Facility: CLINIC | Age: 50
End: 2025-08-21

## 2025-08-21 RX ORDER — FLUCONAZOLE 200 MG/1
2 TABLETS ON DAY 1 AND 1 TABLET DAILY FOR THE REMAINING 13 DAYS TABLET ORAL ONCE A DAY
Qty: 15 | Refills: 0 | OUTPATIENT
Start: 2025-08-21 | End: 2025-09-04

## 2025-08-28 ENCOUNTER — TELEPHONE ENCOUNTER (OUTPATIENT)
Dept: URBAN - METROPOLITAN AREA CLINIC 23 | Facility: CLINIC | Age: 50
End: 2025-08-28